# Patient Record
Sex: MALE | Race: WHITE | NOT HISPANIC OR LATINO | URBAN - METROPOLITAN AREA
[De-identification: names, ages, dates, MRNs, and addresses within clinical notes are randomized per-mention and may not be internally consistent; named-entity substitution may affect disease eponyms.]

---

## 2023-01-01 ENCOUNTER — INPATIENT (INPATIENT)
Facility: HOSPITAL | Age: 88
LOS: 2 days | DRG: 682 | End: 2023-11-27
Attending: STUDENT IN AN ORGANIZED HEALTH CARE EDUCATION/TRAINING PROGRAM | Admitting: STUDENT IN AN ORGANIZED HEALTH CARE EDUCATION/TRAINING PROGRAM
Payer: COMMERCIAL

## 2023-01-01 ENCOUNTER — INPATIENT (INPATIENT)
Facility: HOSPITAL | Age: 88
LOS: 4 days | Discharge: NURSING, REHAB OR LONG-TERM CARE | DRG: 563 | End: 2023-11-22
Attending: INTERNAL MEDICINE | Admitting: INTERNAL MEDICINE
Payer: COMMERCIAL

## 2023-01-01 VITALS
RESPIRATION RATE: 18 BRPM | SYSTOLIC BLOOD PRESSURE: 148 MMHG | WEIGHT: 199.96 LBS | OXYGEN SATURATION: 98 % | TEMPERATURE: 98 F | DIASTOLIC BLOOD PRESSURE: 67 MMHG | HEART RATE: 78 BPM

## 2023-01-01 VITALS
SYSTOLIC BLOOD PRESSURE: 133 MMHG | HEART RATE: 102 BPM | TEMPERATURE: 97 F | OXYGEN SATURATION: 100 % | RESPIRATION RATE: 18 BRPM | WEIGHT: 182.98 LBS | DIASTOLIC BLOOD PRESSURE: 67 MMHG | HEIGHT: 66 IN

## 2023-01-01 VITALS
DIASTOLIC BLOOD PRESSURE: 70 MMHG | TEMPERATURE: 98 F | OXYGEN SATURATION: 97 % | HEART RATE: 95 BPM | SYSTOLIC BLOOD PRESSURE: 143 MMHG | RESPIRATION RATE: 18 BRPM

## 2023-01-01 VITALS — HEART RATE: 86 BPM | RESPIRATION RATE: 19 BRPM

## 2023-01-01 DIAGNOSIS — N40.0 BENIGN PROSTATIC HYPERPLASIA WITHOUT LOWER URINARY TRACT SYMPTOMS: ICD-10-CM

## 2023-01-01 DIAGNOSIS — E78.5 HYPERLIPIDEMIA, UNSPECIFIED: ICD-10-CM

## 2023-01-01 DIAGNOSIS — I48.91 UNSPECIFIED ATRIAL FIBRILLATION: ICD-10-CM

## 2023-01-01 DIAGNOSIS — R20.0 ANESTHESIA OF SKIN: ICD-10-CM

## 2023-01-01 DIAGNOSIS — N17.9 ACUTE KIDNEY FAILURE, UNSPECIFIED: ICD-10-CM

## 2023-01-01 DIAGNOSIS — D53.9 NUTRITIONAL ANEMIA, UNSPECIFIED: ICD-10-CM

## 2023-01-01 DIAGNOSIS — Z29.9 ENCOUNTER FOR PROPHYLACTIC MEASURES, UNSPECIFIED: ICD-10-CM

## 2023-01-01 DIAGNOSIS — I67.1 CEREBRAL ANEURYSM, NONRUPTURED: ICD-10-CM

## 2023-01-01 DIAGNOSIS — Z87.438 PERSONAL HISTORY OF OTHER DISEASES OF MALE GENITAL ORGANS: ICD-10-CM

## 2023-01-01 DIAGNOSIS — C90.00 MULTIPLE MYELOMA NOT HAVING ACHIEVED REMISSION: ICD-10-CM

## 2023-01-01 DIAGNOSIS — I10 ESSENTIAL (PRIMARY) HYPERTENSION: ICD-10-CM

## 2023-01-01 DIAGNOSIS — R79.89 OTHER SPECIFIED ABNORMAL FINDINGS OF BLOOD CHEMISTRY: ICD-10-CM
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
## 2023-01-01 DIAGNOSIS — R53.81 OTHER MALAISE: ICD-10-CM

## 2023-01-01 DIAGNOSIS — M79.605 PAIN IN LEFT LEG: ICD-10-CM

## 2023-01-01 DIAGNOSIS — J96.01 ACUTE RESPIRATORY FAILURE WITH HYPOXIA: ICD-10-CM

## 2023-01-01 DIAGNOSIS — I48.0 PAROXYSMAL ATRIAL FIBRILLATION: ICD-10-CM

## 2023-01-01 DIAGNOSIS — R29.6 REPEATED FALLS: ICD-10-CM

## 2023-01-01 DIAGNOSIS — Z51.5 ENCOUNTER FOR PALLIATIVE CARE: ICD-10-CM

## 2023-01-01 DIAGNOSIS — G93.41 METABOLIC ENCEPHALOPATHY: ICD-10-CM

## 2023-01-01 DIAGNOSIS — E43 UNSPECIFIED SEVERE PROTEIN-CALORIE MALNUTRITION: ICD-10-CM

## 2023-01-01 LAB
% ALBUMIN: 57.7 % — SIGNIFICANT CHANGE UP
% ALBUMIN: 57.7 % — SIGNIFICANT CHANGE UP
% ALPHA 1: 5.6 % — SIGNIFICANT CHANGE UP
% ALPHA 1: 5.6 % — SIGNIFICANT CHANGE UP
% ALPHA 2: 12 % — SIGNIFICANT CHANGE UP
% ALPHA 2: 12 % — SIGNIFICANT CHANGE UP
% BETA: 12.8 % — SIGNIFICANT CHANGE UP
% BETA: 12.8 % — SIGNIFICANT CHANGE UP
% GAMMA: 11.9 % — SIGNIFICANT CHANGE UP
% GAMMA: 11.9 % — SIGNIFICANT CHANGE UP
24R-OH-CALCIDIOL SERPL-MCNC: 51.9 NG/ML — SIGNIFICANT CHANGE UP (ref 30–80)
24R-OH-CALCIDIOL SERPL-MCNC: 51.9 NG/ML — SIGNIFICANT CHANGE UP (ref 30–80)
A1C WITH ESTIMATED AVERAGE GLUCOSE RESULT: 4.9 % — SIGNIFICANT CHANGE UP (ref 4–5.6)
A1C WITH ESTIMATED AVERAGE GLUCOSE RESULT: 4.9 % — SIGNIFICANT CHANGE UP (ref 4–5.6)
ALBUMIN SERPL ELPH-MCNC: 1.9 G/DL — LOW (ref 3.5–5)
ALBUMIN SERPL ELPH-MCNC: 1.9 G/DL — LOW (ref 3.5–5)
ALBUMIN SERPL ELPH-MCNC: 2.3 G/DL — LOW (ref 3.5–5)
ALBUMIN SERPL ELPH-MCNC: 2.3 G/DL — LOW (ref 3.5–5)
ALBUMIN SERPL ELPH-MCNC: 2.4 G/DL — LOW (ref 3.5–5)
ALBUMIN SERPL ELPH-MCNC: 2.4 G/DL — LOW (ref 3.5–5)
ALBUMIN SERPL ELPH-MCNC: 2.5 G/DL — LOW (ref 3.5–5)
ALBUMIN SERPL ELPH-MCNC: 2.5 G/DL — LOW (ref 3.5–5)
ALBUMIN SERPL ELPH-MCNC: 2.7 G/DL — LOW (ref 3.5–5)
ALBUMIN SERPL ELPH-MCNC: 2.7 G/DL — LOW (ref 3.5–5)
ALBUMIN SERPL ELPH-MCNC: 3.2 G/DL — LOW (ref 3.6–5.5)
ALBUMIN SERPL ELPH-MCNC: 3.2 G/DL — LOW (ref 3.6–5.5)
ALBUMIN SERPL ELPH-MCNC: 3.3 G/DL — LOW (ref 3.5–5)
ALBUMIN SERPL ELPH-MCNC: 3.3 G/DL — LOW (ref 3.5–5)
ALBUMIN/GLOB SERPL ELPH: 1.3 RATIO — SIGNIFICANT CHANGE UP
ALBUMIN/GLOB SERPL ELPH: 1.3 RATIO — SIGNIFICANT CHANGE UP
ALP SERPL-CCNC: 41 U/L — SIGNIFICANT CHANGE UP (ref 40–120)
ALP SERPL-CCNC: 41 U/L — SIGNIFICANT CHANGE UP (ref 40–120)
ALP SERPL-CCNC: 43 U/L — SIGNIFICANT CHANGE UP (ref 40–120)
ALP SERPL-CCNC: 43 U/L — SIGNIFICANT CHANGE UP (ref 40–120)
ALP SERPL-CCNC: 48 U/L — SIGNIFICANT CHANGE UP (ref 40–120)
ALP SERPL-CCNC: 48 U/L — SIGNIFICANT CHANGE UP (ref 40–120)
ALP SERPL-CCNC: 54 U/L — SIGNIFICANT CHANGE UP (ref 40–120)
ALP SERPL-CCNC: 54 U/L — SIGNIFICANT CHANGE UP (ref 40–120)
ALP SERPL-CCNC: 57 U/L — SIGNIFICANT CHANGE UP (ref 40–120)
ALP SERPL-CCNC: 57 U/L — SIGNIFICANT CHANGE UP (ref 40–120)
ALP SERPL-CCNC: 66 U/L — SIGNIFICANT CHANGE UP (ref 40–120)
ALP SERPL-CCNC: 66 U/L — SIGNIFICANT CHANGE UP (ref 40–120)
ALPHA1 GLOB SERPL ELPH-MCNC: 0.3 G/DL — SIGNIFICANT CHANGE UP (ref 0.1–0.4)
ALPHA1 GLOB SERPL ELPH-MCNC: 0.3 G/DL — SIGNIFICANT CHANGE UP (ref 0.1–0.4)
ALPHA2 GLOB SERPL ELPH-MCNC: 0.7 G/DL — SIGNIFICANT CHANGE UP (ref 0.5–1)
ALPHA2 GLOB SERPL ELPH-MCNC: 0.7 G/DL — SIGNIFICANT CHANGE UP (ref 0.5–1)
ALT FLD-CCNC: 19 U/L DA — SIGNIFICANT CHANGE UP (ref 10–60)
ALT FLD-CCNC: 19 U/L DA — SIGNIFICANT CHANGE UP (ref 10–60)
ALT FLD-CCNC: 22 U/L DA — SIGNIFICANT CHANGE UP (ref 10–60)
ALT FLD-CCNC: 22 U/L DA — SIGNIFICANT CHANGE UP (ref 10–60)
ALT FLD-CCNC: 24 U/L DA — SIGNIFICANT CHANGE UP (ref 10–60)
ALT FLD-CCNC: 24 U/L DA — SIGNIFICANT CHANGE UP (ref 10–60)
ALT FLD-CCNC: 29 U/L DA — SIGNIFICANT CHANGE UP (ref 10–60)
ALT FLD-CCNC: 29 U/L DA — SIGNIFICANT CHANGE UP (ref 10–60)
ALT FLD-CCNC: 32 U/L DA — SIGNIFICANT CHANGE UP (ref 10–60)
ALT FLD-CCNC: 32 U/L DA — SIGNIFICANT CHANGE UP (ref 10–60)
ALT FLD-CCNC: 960 U/L DA — HIGH (ref 10–60)
ALT FLD-CCNC: 960 U/L DA — HIGH (ref 10–60)
AMMONIA BLD-MCNC: 129 UMOL/L — HIGH (ref 11–32)
AMMONIA BLD-MCNC: 129 UMOL/L — HIGH (ref 11–32)
ANA PAT FLD IF-IMP: ABNORMAL
ANA PAT FLD IF-IMP: ABNORMAL
ANA TITR SER: ABNORMAL
ANA TITR SER: ABNORMAL
ANION GAP SERPL CALC-SCNC: 22 MMOL/L — HIGH (ref 5–17)
ANION GAP SERPL CALC-SCNC: 22 MMOL/L — HIGH (ref 5–17)
ANION GAP SERPL CALC-SCNC: 3 MMOL/L — LOW (ref 5–17)
ANION GAP SERPL CALC-SCNC: 3 MMOL/L — LOW (ref 5–17)
ANION GAP SERPL CALC-SCNC: 5 MMOL/L — SIGNIFICANT CHANGE UP (ref 5–17)
ANION GAP SERPL CALC-SCNC: 6 MMOL/L — SIGNIFICANT CHANGE UP (ref 5–17)
ANION GAP SERPL CALC-SCNC: 7 MMOL/L — SIGNIFICANT CHANGE UP (ref 5–17)
ANION GAP SERPL CALC-SCNC: 9 MMOL/L — SIGNIFICANT CHANGE UP (ref 5–17)
ANION GAP SERPL CALC-SCNC: 9 MMOL/L — SIGNIFICANT CHANGE UP (ref 5–17)
ANISOCYTOSIS BLD QL: SLIGHT — SIGNIFICANT CHANGE UP
APPEARANCE UR: CLEAR — SIGNIFICANT CHANGE UP
APTT BLD: 25.6 SEC — SIGNIFICANT CHANGE UP (ref 24.5–35.6)
APTT BLD: 25.6 SEC — SIGNIFICANT CHANGE UP (ref 24.5–35.6)
APTT BLD: 26.2 SEC — SIGNIFICANT CHANGE UP (ref 24.5–35.6)
APTT BLD: 26.2 SEC — SIGNIFICANT CHANGE UP (ref 24.5–35.6)
APTT BLD: 26.5 SEC — SIGNIFICANT CHANGE UP (ref 24.5–35.6)
APTT BLD: 26.5 SEC — SIGNIFICANT CHANGE UP (ref 24.5–35.6)
AST SERPL-CCNC: 15 U/L — SIGNIFICANT CHANGE UP (ref 10–40)
AST SERPL-CCNC: 15 U/L — SIGNIFICANT CHANGE UP (ref 10–40)
AST SERPL-CCNC: 1849 U/L — HIGH (ref 10–40)
AST SERPL-CCNC: 1849 U/L — HIGH (ref 10–40)
AST SERPL-CCNC: 21 U/L — SIGNIFICANT CHANGE UP (ref 10–40)
AST SERPL-CCNC: 21 U/L — SIGNIFICANT CHANGE UP (ref 10–40)
AST SERPL-CCNC: 22 U/L — SIGNIFICANT CHANGE UP (ref 10–40)
AST SERPL-CCNC: 22 U/L — SIGNIFICANT CHANGE UP (ref 10–40)
AST SERPL-CCNC: 30 U/L — SIGNIFICANT CHANGE UP (ref 10–40)
AST SERPL-CCNC: 30 U/L — SIGNIFICANT CHANGE UP (ref 10–40)
AST SERPL-CCNC: 33 U/L — SIGNIFICANT CHANGE UP (ref 10–40)
AST SERPL-CCNC: 33 U/L — SIGNIFICANT CHANGE UP (ref 10–40)
B-GLOBULIN SERPL ELPH-MCNC: 0.7 G/DL — SIGNIFICANT CHANGE UP (ref 0.5–1)
B-GLOBULIN SERPL ELPH-MCNC: 0.7 G/DL — SIGNIFICANT CHANGE UP (ref 0.5–1)
BACTERIA # UR AUTO: ABNORMAL /HPF
BACTERIA # UR AUTO: ABNORMAL /HPF
BASE EXCESS BLDV CALC-SCNC: -14.2 MMOL/L — SIGNIFICANT CHANGE UP
BASE EXCESS BLDV CALC-SCNC: -14.2 MMOL/L — SIGNIFICANT CHANGE UP
BASOPHILS # BLD AUTO: 0 K/UL — SIGNIFICANT CHANGE UP (ref 0–0.2)
BASOPHILS # BLD AUTO: 0.02 K/UL — SIGNIFICANT CHANGE UP (ref 0–0.2)
BASOPHILS # BLD AUTO: 0.02 K/UL — SIGNIFICANT CHANGE UP (ref 0–0.2)
BASOPHILS NFR BLD AUTO: 0 % — SIGNIFICANT CHANGE UP (ref 0–2)
BASOPHILS NFR BLD AUTO: 0.2 % — SIGNIFICANT CHANGE UP (ref 0–2)
BASOPHILS NFR BLD AUTO: 0.2 % — SIGNIFICANT CHANGE UP (ref 0–2)
BILIRUB DIRECT SERPL-MCNC: 0.2 MG/DL — SIGNIFICANT CHANGE UP (ref 0–0.3)
BILIRUB DIRECT SERPL-MCNC: 0.2 MG/DL — SIGNIFICANT CHANGE UP (ref 0–0.3)
BILIRUB INDIRECT FLD-MCNC: 0.4 MG/DL — SIGNIFICANT CHANGE UP (ref 0.2–1)
BILIRUB INDIRECT FLD-MCNC: 0.4 MG/DL — SIGNIFICANT CHANGE UP (ref 0.2–1)
BILIRUB SERPL-MCNC: 0.5 MG/DL — SIGNIFICANT CHANGE UP (ref 0.2–1.2)
BILIRUB SERPL-MCNC: 0.5 MG/DL — SIGNIFICANT CHANGE UP (ref 0.2–1.2)
BILIRUB SERPL-MCNC: 0.6 MG/DL — SIGNIFICANT CHANGE UP (ref 0.2–1.2)
BILIRUB SERPL-MCNC: 0.8 MG/DL — SIGNIFICANT CHANGE UP (ref 0.2–1.2)
BILIRUB SERPL-MCNC: 0.8 MG/DL — SIGNIFICANT CHANGE UP (ref 0.2–1.2)
BILIRUB SERPL-MCNC: 1 MG/DL — SIGNIFICANT CHANGE UP (ref 0.2–1.2)
BILIRUB SERPL-MCNC: 1 MG/DL — SIGNIFICANT CHANGE UP (ref 0.2–1.2)
BILIRUB SERPL-MCNC: 1.4 MG/DL — HIGH (ref 0.2–1.2)
BILIRUB SERPL-MCNC: 1.4 MG/DL — HIGH (ref 0.2–1.2)
BILIRUB UR-MCNC: NEGATIVE — SIGNIFICANT CHANGE UP
BUN SERPL-MCNC: 29 MG/DL — HIGH (ref 7–18)
BUN SERPL-MCNC: 29 MG/DL — HIGH (ref 7–18)
BUN SERPL-MCNC: 30 MG/DL — HIGH (ref 7–18)
BUN SERPL-MCNC: 30 MG/DL — HIGH (ref 7–18)
BUN SERPL-MCNC: 32 MG/DL — HIGH (ref 7–18)
BUN SERPL-MCNC: 32 MG/DL — HIGH (ref 7–18)
BUN SERPL-MCNC: 36 MG/DL — HIGH (ref 7–18)
BUN SERPL-MCNC: 39 MG/DL — HIGH (ref 7–18)
BUN SERPL-MCNC: 39 MG/DL — HIGH (ref 7–18)
BUN SERPL-MCNC: 42 MG/DL — HIGH (ref 7–18)
BUN SERPL-MCNC: 42 MG/DL — HIGH (ref 7–18)
BUN SERPL-MCNC: 43 MG/DL — HIGH (ref 7–18)
BUN SERPL-MCNC: 43 MG/DL — HIGH (ref 7–18)
BUN SERPL-MCNC: 54 MG/DL — HIGH (ref 7–18)
BUN SERPL-MCNC: 54 MG/DL — HIGH (ref 7–18)
BUN SERPL-MCNC: 63 MG/DL — HIGH (ref 7–18)
BUN SERPL-MCNC: 63 MG/DL — HIGH (ref 7–18)
BUN SERPL-MCNC: 66 MG/DL — HIGH (ref 7–18)
BUN SERPL-MCNC: 66 MG/DL — HIGH (ref 7–18)
C3 SERPL-MCNC: 97 MG/DL — SIGNIFICANT CHANGE UP (ref 81–157)
C3 SERPL-MCNC: 97 MG/DL — SIGNIFICANT CHANGE UP (ref 81–157)
C4 SERPL-MCNC: 31 MG/DL — SIGNIFICANT CHANGE UP (ref 13–39)
C4 SERPL-MCNC: 31 MG/DL — SIGNIFICANT CHANGE UP (ref 13–39)
CA-I BLD-SCNC: 5 MG/DL — SIGNIFICANT CHANGE UP (ref 4.5–5.6)
CA-I BLD-SCNC: 5 MG/DL — SIGNIFICANT CHANGE UP (ref 4.5–5.6)
CA-I SERPL-SCNC: SIGNIFICANT CHANGE UP MMOL/L (ref 1.15–1.33)
CA-I SERPL-SCNC: SIGNIFICANT CHANGE UP MMOL/L (ref 1.15–1.33)
CALCIUM SERPL-MCNC: 7.7 MG/DL — LOW (ref 8.4–10.5)
CALCIUM SERPL-MCNC: 7.7 MG/DL — LOW (ref 8.4–10.5)
CALCIUM SERPL-MCNC: 7.8 MG/DL — LOW (ref 8.4–10.5)
CALCIUM SERPL-MCNC: 7.8 MG/DL — LOW (ref 8.4–10.5)
CALCIUM SERPL-MCNC: 8.1 MG/DL — LOW (ref 8.4–10.5)
CALCIUM SERPL-MCNC: 8.1 MG/DL — LOW (ref 8.4–10.5)
CALCIUM SERPL-MCNC: 8.3 MG/DL — LOW (ref 8.4–10.5)
CALCIUM SERPL-MCNC: 8.3 MG/DL — LOW (ref 8.4–10.5)
CALCIUM SERPL-MCNC: 8.4 MG/DL — SIGNIFICANT CHANGE UP (ref 8.4–10.5)
CALCIUM SERPL-MCNC: 8.4 MG/DL — SIGNIFICANT CHANGE UP (ref 8.4–10.5)
CALCIUM SERPL-MCNC: 8.5 MG/DL — SIGNIFICANT CHANGE UP (ref 8.4–10.5)
CALCIUM SERPL-MCNC: 8.5 MG/DL — SIGNIFICANT CHANGE UP (ref 8.4–10.5)
CALCIUM SERPL-MCNC: 8.7 MG/DL — SIGNIFICANT CHANGE UP (ref 8.4–10.5)
CALCIUM SERPL-MCNC: 8.7 MG/DL — SIGNIFICANT CHANGE UP (ref 8.4–10.5)
CALCIUM SERPL-MCNC: 8.8 MG/DL — SIGNIFICANT CHANGE UP (ref 8.4–10.5)
CALCIUM SERPL-MCNC: 8.8 MG/DL — SIGNIFICANT CHANGE UP (ref 8.4–10.5)
CALCIUM SERPL-MCNC: 8.9 MG/DL — SIGNIFICANT CHANGE UP (ref 8.4–10.5)
CALCIUM SERPL-MCNC: 8.9 MG/DL — SIGNIFICANT CHANGE UP (ref 8.4–10.5)
CALCIUM SERPL-MCNC: 9.1 MG/DL — SIGNIFICANT CHANGE UP (ref 8.4–10.5)
CALCIUM SERPL-MCNC: 9.1 MG/DL — SIGNIFICANT CHANGE UP (ref 8.4–10.5)
CALCIUM SERPL-MCNC: 9.6 MG/DL — SIGNIFICANT CHANGE UP (ref 8.4–10.5)
CALCIUM SERPL-MCNC: 9.6 MG/DL — SIGNIFICANT CHANGE UP (ref 8.4–10.5)
CHLORIDE SERPL-SCNC: 108 MMOL/L — SIGNIFICANT CHANGE UP (ref 96–108)
CHLORIDE SERPL-SCNC: 109 MMOL/L — HIGH (ref 96–108)
CHLORIDE SERPL-SCNC: 110 MMOL/L — HIGH (ref 96–108)
CHLORIDE SERPL-SCNC: 111 MMOL/L — HIGH (ref 96–108)
CHLORIDE SERPL-SCNC: 111 MMOL/L — HIGH (ref 96–108)
CHLORIDE SERPL-SCNC: 112 MMOL/L — HIGH (ref 96–108)
CHLORIDE SERPL-SCNC: 112 MMOL/L — HIGH (ref 96–108)
CHLORIDE SERPL-SCNC: 115 MMOL/L — HIGH (ref 96–108)
CHLORIDE SERPL-SCNC: 115 MMOL/L — HIGH (ref 96–108)
CK SERPL-CCNC: 394 U/L — HIGH (ref 35–232)
CK SERPL-CCNC: 394 U/L — HIGH (ref 35–232)
CK SERPL-CCNC: 448 U/L — HIGH (ref 35–232)
CK SERPL-CCNC: 448 U/L — HIGH (ref 35–232)
CO2 SERPL-SCNC: 11 MMOL/L — LOW (ref 22–31)
CO2 SERPL-SCNC: 11 MMOL/L — LOW (ref 22–31)
CO2 SERPL-SCNC: 23 MMOL/L — SIGNIFICANT CHANGE UP (ref 22–31)
CO2 SERPL-SCNC: 23 MMOL/L — SIGNIFICANT CHANGE UP (ref 22–31)
CO2 SERPL-SCNC: 24 MMOL/L — SIGNIFICANT CHANGE UP (ref 22–31)
CO2 SERPL-SCNC: 25 MMOL/L — SIGNIFICANT CHANGE UP (ref 22–31)
CO2 SERPL-SCNC: 26 MMOL/L — SIGNIFICANT CHANGE UP (ref 22–31)
CO2 SERPL-SCNC: 27 MMOL/L — SIGNIFICANT CHANGE UP (ref 22–31)
CO2 SERPL-SCNC: 27 MMOL/L — SIGNIFICANT CHANGE UP (ref 22–31)
COLOR SPEC: YELLOW — SIGNIFICANT CHANGE UP
CREAT ?TM UR-MCNC: 125 MG/DL — SIGNIFICANT CHANGE UP
CREAT ?TM UR-MCNC: 125 MG/DL — SIGNIFICANT CHANGE UP
CREAT ?TM UR-MCNC: 280 MG/DL — SIGNIFICANT CHANGE UP
CREAT ?TM UR-MCNC: 280 MG/DL — SIGNIFICANT CHANGE UP
CREAT SERPL-MCNC: 1.37 MG/DL — HIGH (ref 0.5–1.3)
CREAT SERPL-MCNC: 1.37 MG/DL — HIGH (ref 0.5–1.3)
CREAT SERPL-MCNC: 1.64 MG/DL — HIGH (ref 0.5–1.3)
CREAT SERPL-MCNC: 1.64 MG/DL — HIGH (ref 0.5–1.3)
CREAT SERPL-MCNC: 1.7 MG/DL — HIGH (ref 0.5–1.3)
CREAT SERPL-MCNC: 1.7 MG/DL — HIGH (ref 0.5–1.3)
CREAT SERPL-MCNC: 1.75 MG/DL — HIGH (ref 0.5–1.3)
CREAT SERPL-MCNC: 2 MG/DL — HIGH (ref 0.5–1.3)
CREAT SERPL-MCNC: 2 MG/DL — HIGH (ref 0.5–1.3)
CREAT SERPL-MCNC: 2.81 MG/DL — HIGH (ref 0.5–1.3)
CREAT SERPL-MCNC: 2.81 MG/DL — HIGH (ref 0.5–1.3)
CREAT SERPL-MCNC: 3.03 MG/DL — HIGH (ref 0.5–1.3)
CREAT SERPL-MCNC: 3.03 MG/DL — HIGH (ref 0.5–1.3)
CREAT SERPL-MCNC: 3.26 MG/DL — HIGH (ref 0.5–1.3)
CREAT SERPL-MCNC: 3.26 MG/DL — HIGH (ref 0.5–1.3)
CREAT SERPL-MCNC: 3.27 MG/DL — HIGH (ref 0.5–1.3)
CREAT SERPL-MCNC: 3.27 MG/DL — HIGH (ref 0.5–1.3)
CREAT SERPL-MCNC: 4.21 MG/DL — HIGH (ref 0.5–1.3)
CREAT SERPL-MCNC: 4.21 MG/DL — HIGH (ref 0.5–1.3)
CREATININE, URINE RESULT: 124 MG/DL — SIGNIFICANT CHANGE UP
CREATININE, URINE RESULT: 124 MG/DL — SIGNIFICANT CHANGE UP
CRP SERPL-MCNC: 97 MG/L — HIGH
CRP SERPL-MCNC: 97 MG/L — HIGH
CULTURE RESULTS: NO GROWTH — SIGNIFICANT CHANGE UP
CULTURE RESULTS: NO GROWTH — SIGNIFICANT CHANGE UP
DACRYOCYTES BLD QL SMEAR: SLIGHT — SIGNIFICANT CHANGE UP
DACRYOCYTES BLD QL SMEAR: SLIGHT — SIGNIFICANT CHANGE UP
DIFF PNL FLD: ABNORMAL
DIFF PNL FLD: ABNORMAL
DIFF PNL FLD: NEGATIVE — SIGNIFICANT CHANGE UP
DIFF PNL FLD: NEGATIVE — SIGNIFICANT CHANGE UP
DIGOXIN SERPL-MCNC: 0.9 NG/ML — SIGNIFICANT CHANGE UP (ref 0.8–2)
DIGOXIN SERPL-MCNC: 0.9 NG/ML — SIGNIFICANT CHANGE UP (ref 0.8–2)
DIGOXIN SERPL-MCNC: 1 NG/ML — SIGNIFICANT CHANGE UP (ref 0.8–2)
DIGOXIN SERPL-MCNC: 1 NG/ML — SIGNIFICANT CHANGE UP (ref 0.8–2)
EGFR: 13 ML/MIN/1.73M2 — LOW
EGFR: 13 ML/MIN/1.73M2 — LOW
EGFR: 17 ML/MIN/1.73M2 — LOW
EGFR: 19 ML/MIN/1.73M2 — LOW
EGFR: 19 ML/MIN/1.73M2 — LOW
EGFR: 20 ML/MIN/1.73M2 — LOW
EGFR: 20 ML/MIN/1.73M2 — LOW
EGFR: 31 ML/MIN/1.73M2 — LOW
EGFR: 31 ML/MIN/1.73M2 — LOW
EGFR: 36 ML/MIN/1.73M2 — LOW
EGFR: 37 ML/MIN/1.73M2 — LOW
EGFR: 37 ML/MIN/1.73M2 — LOW
EGFR: 39 ML/MIN/1.73M2 — LOW
EGFR: 39 ML/MIN/1.73M2 — LOW
EGFR: 48 ML/MIN/1.73M2 — LOW
EGFR: 48 ML/MIN/1.73M2 — LOW
EOSINOPHIL # BLD AUTO: 0 K/UL — SIGNIFICANT CHANGE UP (ref 0–0.5)
EOSINOPHIL # BLD AUTO: 0.01 K/UL — SIGNIFICANT CHANGE UP (ref 0–0.5)
EOSINOPHIL # BLD AUTO: 0.01 K/UL — SIGNIFICANT CHANGE UP (ref 0–0.5)
EOSINOPHIL NFR BLD AUTO: 0 % — SIGNIFICANT CHANGE UP (ref 0–6)
EOSINOPHIL NFR BLD AUTO: 0.1 % — SIGNIFICANT CHANGE UP (ref 0–6)
EOSINOPHIL NFR BLD AUTO: 0.1 % — SIGNIFICANT CHANGE UP (ref 0–6)
EPI CELLS # UR: SIGNIFICANT CHANGE UP
EPI CELLS # UR: SIGNIFICANT CHANGE UP
ERYTHROCYTE [SEDIMENTATION RATE] IN BLOOD: 53 MM/HR — HIGH (ref 0–20)
ERYTHROCYTE [SEDIMENTATION RATE] IN BLOOD: 53 MM/HR — HIGH (ref 0–20)
ESTIMATED AVERAGE GLUCOSE: 94 MG/DL — SIGNIFICANT CHANGE UP (ref 68–114)
ESTIMATED AVERAGE GLUCOSE: 94 MG/DL — SIGNIFICANT CHANGE UP (ref 68–114)
FERRITIN SERPL-MCNC: 630 NG/ML — HIGH (ref 30–400)
FERRITIN SERPL-MCNC: 630 NG/ML — HIGH (ref 30–400)
FOLATE SERPL-MCNC: 5.9 NG/ML — SIGNIFICANT CHANGE UP
FOLATE SERPL-MCNC: 5.9 NG/ML — SIGNIFICANT CHANGE UP
FOLATE SERPL-MCNC: 6 NG/ML — SIGNIFICANT CHANGE UP
FOLATE SERPL-MCNC: 6 NG/ML — SIGNIFICANT CHANGE UP
GAMMA GLOBULIN: 0.7 G/DL — SIGNIFICANT CHANGE UP (ref 0.6–1.6)
GAMMA GLOBULIN: 0.7 G/DL — SIGNIFICANT CHANGE UP (ref 0.6–1.6)
GAS PNL BLDA: SIGNIFICANT CHANGE UP
GAS PNL BLDA: SIGNIFICANT CHANGE UP
GAS PNL BLDV: 140 MMOL/L — SIGNIFICANT CHANGE UP (ref 136–145)
GAS PNL BLDV: 140 MMOL/L — SIGNIFICANT CHANGE UP (ref 136–145)
GAS PNL BLDV: SIGNIFICANT CHANGE UP
GAS PNL BLDV: SIGNIFICANT CHANGE UP
GLUCOSE BLDC GLUCOMTR-MCNC: 159 MG/DL — HIGH (ref 70–99)
GLUCOSE BLDC GLUCOMTR-MCNC: 159 MG/DL — HIGH (ref 70–99)
GLUCOSE SERPL-MCNC: 100 MG/DL — HIGH (ref 70–99)
GLUCOSE SERPL-MCNC: 100 MG/DL — HIGH (ref 70–99)
GLUCOSE SERPL-MCNC: 101 MG/DL — HIGH (ref 70–99)
GLUCOSE SERPL-MCNC: 108 MG/DL — HIGH (ref 70–99)
GLUCOSE SERPL-MCNC: 108 MG/DL — HIGH (ref 70–99)
GLUCOSE SERPL-MCNC: 110 MG/DL — HIGH (ref 70–99)
GLUCOSE SERPL-MCNC: 110 MG/DL — HIGH (ref 70–99)
GLUCOSE SERPL-MCNC: 116 MG/DL — HIGH (ref 70–99)
GLUCOSE SERPL-MCNC: 116 MG/DL — HIGH (ref 70–99)
GLUCOSE SERPL-MCNC: 123 MG/DL — HIGH (ref 70–99)
GLUCOSE SERPL-MCNC: 123 MG/DL — HIGH (ref 70–99)
GLUCOSE SERPL-MCNC: 141 MG/DL — HIGH (ref 70–99)
GLUCOSE SERPL-MCNC: 141 MG/DL — HIGH (ref 70–99)
GLUCOSE SERPL-MCNC: 64 MG/DL — LOW (ref 70–99)
GLUCOSE SERPL-MCNC: 64 MG/DL — LOW (ref 70–99)
GLUCOSE SERPL-MCNC: 96 MG/DL — SIGNIFICANT CHANGE UP (ref 70–99)
GLUCOSE SERPL-MCNC: 96 MG/DL — SIGNIFICANT CHANGE UP (ref 70–99)
GLUCOSE SERPL-MCNC: 97 MG/DL — SIGNIFICANT CHANGE UP (ref 70–99)
GLUCOSE SERPL-MCNC: 97 MG/DL — SIGNIFICANT CHANGE UP (ref 70–99)
GLUCOSE UR QL: NEGATIVE MG/DL — SIGNIFICANT CHANGE UP
HAPTOGLOB SERPL-MCNC: 278 MG/DL — HIGH (ref 34–200)
HAPTOGLOB SERPL-MCNC: 278 MG/DL — HIGH (ref 34–200)
HCO3 BLDV-SCNC: 13 MMOL/L — LOW (ref 22–29)
HCO3 BLDV-SCNC: 13 MMOL/L — LOW (ref 22–29)
HCT VFR BLD CALC: 23 % — LOW (ref 39–50)
HCT VFR BLD CALC: 23 % — LOW (ref 39–50)
HCT VFR BLD CALC: 24.1 % — LOW (ref 39–50)
HCT VFR BLD CALC: 24.1 % — LOW (ref 39–50)
HCT VFR BLD CALC: 25 % — LOW (ref 39–50)
HCT VFR BLD CALC: 25 % — LOW (ref 39–50)
HCT VFR BLD CALC: 25.2 % — LOW (ref 39–50)
HCT VFR BLD CALC: 25.2 % — LOW (ref 39–50)
HCT VFR BLD CALC: 25.4 % — LOW (ref 39–50)
HCT VFR BLD CALC: 25.4 % — LOW (ref 39–50)
HCT VFR BLD CALC: 28.2 % — LOW (ref 39–50)
HCT VFR BLD CALC: 28.2 % — LOW (ref 39–50)
HCT VFR BLD CALC: 28.7 % — LOW (ref 39–50)
HCT VFR BLD CALC: 28.7 % — LOW (ref 39–50)
HCT VFR BLD CALC: 30.3 % — LOW (ref 39–50)
HCT VFR BLD CALC: 30.3 % — LOW (ref 39–50)
HGB BLD-MCNC: 7.3 G/DL — LOW (ref 13–17)
HGB BLD-MCNC: 7.3 G/DL — LOW (ref 13–17)
HGB BLD-MCNC: 7.8 G/DL — LOW (ref 13–17)
HGB BLD-MCNC: 7.8 G/DL — LOW (ref 13–17)
HGB BLD-MCNC: 7.9 G/DL — LOW (ref 13–17)
HGB BLD-MCNC: 7.9 G/DL — LOW (ref 13–17)
HGB BLD-MCNC: 8.2 G/DL — LOW (ref 13–17)
HGB BLD-MCNC: 8.2 G/DL — LOW (ref 13–17)
HGB BLD-MCNC: 8.3 G/DL — LOW (ref 13–17)
HGB BLD-MCNC: 9 G/DL — LOW (ref 13–17)
HGB BLD-MCNC: 9 G/DL — LOW (ref 13–17)
HGB BLD-MCNC: 9.7 G/DL — LOW (ref 13–17)
HGB BLD-MCNC: 9.7 G/DL — LOW (ref 13–17)
HYPOCHROMIA BLD QL: SLIGHT — SIGNIFICANT CHANGE UP
HYPOCHROMIA BLD QL: SLIGHT — SIGNIFICANT CHANGE UP
IMM GRANULOCYTES NFR BLD AUTO: 0.9 % — SIGNIFICANT CHANGE UP (ref 0–0.9)
IMM GRANULOCYTES NFR BLD AUTO: 0.9 % — SIGNIFICANT CHANGE UP (ref 0–0.9)
INR BLD: 1.09 RATIO — SIGNIFICANT CHANGE UP (ref 0.85–1.18)
INR BLD: 1.09 RATIO — SIGNIFICANT CHANGE UP (ref 0.85–1.18)
INR BLD: 1.1 RATIO — SIGNIFICANT CHANGE UP (ref 0.85–1.18)
INR BLD: 1.1 RATIO — SIGNIFICANT CHANGE UP (ref 0.85–1.18)
INR BLD: 1.5 RATIO — HIGH (ref 0.85–1.18)
INR BLD: 1.5 RATIO — HIGH (ref 0.85–1.18)
INTERPRETATION SERPL IFE-IMP: SIGNIFICANT CHANGE UP
INTERPRETATION SERPL IFE-IMP: SIGNIFICANT CHANGE UP
IRON SATN MFR SERPL: 4 % — LOW (ref 20–55)
IRON SATN MFR SERPL: 4 % — LOW (ref 20–55)
IRON SATN MFR SERPL: 9 UG/DL — LOW (ref 65–170)
IRON SATN MFR SERPL: 9 UG/DL — LOW (ref 65–170)
KETONES UR-MCNC: ABNORMAL MG/DL
KETONES UR-MCNC: ABNORMAL MG/DL
KETONES UR-MCNC: NEGATIVE MG/DL — SIGNIFICANT CHANGE UP
KETONES UR-MCNC: NEGATIVE MG/DL — SIGNIFICANT CHANGE UP
LACTATE BLDV-MCNC: 13.3 MMOL/L — CRITICAL HIGH (ref 0.5–2)
LACTATE BLDV-MCNC: 13.3 MMOL/L — CRITICAL HIGH (ref 0.5–2)
LACTATE SERPL-SCNC: 1.9 MMOL/L — SIGNIFICANT CHANGE UP (ref 0.7–2)
LACTATE SERPL-SCNC: 1.9 MMOL/L — SIGNIFICANT CHANGE UP (ref 0.7–2)
LACTATE SERPL-SCNC: 18.5 MMOL/L — CRITICAL HIGH (ref 0.7–2)
LACTATE SERPL-SCNC: 18.5 MMOL/L — CRITICAL HIGH (ref 0.7–2)
LACTATE SERPL-SCNC: 2.3 MMOL/L — HIGH (ref 0.7–2)
LACTATE SERPL-SCNC: 2.3 MMOL/L — HIGH (ref 0.7–2)
LDH SERPL L TO P-CCNC: 227 U/L — HIGH (ref 120–225)
LDH SERPL L TO P-CCNC: 227 U/L — HIGH (ref 120–225)
LEUKOCYTE ESTERASE UR-ACNC: NEGATIVE — SIGNIFICANT CHANGE UP
LYMPHOCYTES # BLD AUTO: 0.36 K/UL — LOW (ref 1–3.3)
LYMPHOCYTES # BLD AUTO: 0.36 K/UL — LOW (ref 1–3.3)
LYMPHOCYTES # BLD AUTO: 0.55 K/UL — LOW (ref 1–3.3)
LYMPHOCYTES # BLD AUTO: 0.55 K/UL — LOW (ref 1–3.3)
LYMPHOCYTES # BLD AUTO: 0.66 K/UL — LOW (ref 1–3.3)
LYMPHOCYTES # BLD AUTO: 0.66 K/UL — LOW (ref 1–3.3)
LYMPHOCYTES # BLD AUTO: 1.49 K/UL — SIGNIFICANT CHANGE UP (ref 1–3.3)
LYMPHOCYTES # BLD AUTO: 1.49 K/UL — SIGNIFICANT CHANGE UP (ref 1–3.3)
LYMPHOCYTES # BLD AUTO: 13 % — SIGNIFICANT CHANGE UP (ref 13–44)
LYMPHOCYTES # BLD AUTO: 13 % — SIGNIFICANT CHANGE UP (ref 13–44)
LYMPHOCYTES # BLD AUTO: 3 % — LOW (ref 13–44)
LYMPHOCYTES # BLD AUTO: 3 % — LOW (ref 13–44)
LYMPHOCYTES # BLD AUTO: 6 % — LOW (ref 13–44)
LYMPHOCYTES # BLD AUTO: 6 % — LOW (ref 13–44)
LYMPHOCYTES # BLD AUTO: 6.4 % — LOW (ref 13–44)
LYMPHOCYTES # BLD AUTO: 6.4 % — LOW (ref 13–44)
M PROTEIN 24H UR ELPH-MRATE: SIGNIFICANT CHANGE UP MG/DL
M PROTEIN 24H UR ELPH-MRATE: SIGNIFICANT CHANGE UP MG/DL
MACROCYTES BLD QL: SLIGHT — SIGNIFICANT CHANGE UP
MACROCYTES BLD QL: SLIGHT — SIGNIFICANT CHANGE UP
MAGNESIUM SERPL-MCNC: 1.3 MG/DL — LOW (ref 1.6–2.6)
MAGNESIUM SERPL-MCNC: 1.3 MG/DL — LOW (ref 1.6–2.6)
MAGNESIUM SERPL-MCNC: 1.6 MG/DL — SIGNIFICANT CHANGE UP (ref 1.6–2.6)
MAGNESIUM SERPL-MCNC: 1.6 MG/DL — SIGNIFICANT CHANGE UP (ref 1.6–2.6)
MAGNESIUM SERPL-MCNC: 1.8 MG/DL — SIGNIFICANT CHANGE UP (ref 1.6–2.6)
MAGNESIUM SERPL-MCNC: 1.9 MG/DL — SIGNIFICANT CHANGE UP (ref 1.6–2.6)
MAGNESIUM SERPL-MCNC: 1.9 MG/DL — SIGNIFICANT CHANGE UP (ref 1.6–2.6)
MAGNESIUM SERPL-MCNC: 2 MG/DL — SIGNIFICANT CHANGE UP (ref 1.6–2.6)
MAGNESIUM SERPL-MCNC: 2.4 MG/DL — SIGNIFICANT CHANGE UP (ref 1.6–2.6)
MAGNESIUM SERPL-MCNC: 2.4 MG/DL — SIGNIFICANT CHANGE UP (ref 1.6–2.6)
MANUAL SMEAR VERIFICATION: SIGNIFICANT CHANGE UP
MCHC RBC-ENTMCNC: 29.4 GM/DL — LOW (ref 32–36)
MCHC RBC-ENTMCNC: 29.4 GM/DL — LOW (ref 32–36)
MCHC RBC-ENTMCNC: 31 GM/DL — LOW (ref 32–36)
MCHC RBC-ENTMCNC: 31 GM/DL — LOW (ref 32–36)
MCHC RBC-ENTMCNC: 31.4 GM/DL — LOW (ref 32–36)
MCHC RBC-ENTMCNC: 31.4 GM/DL — LOW (ref 32–36)
MCHC RBC-ENTMCNC: 31.7 GM/DL — LOW (ref 32–36)
MCHC RBC-ENTMCNC: 31.7 GM/DL — LOW (ref 32–36)
MCHC RBC-ENTMCNC: 32 GM/DL — SIGNIFICANT CHANGE UP (ref 32–36)
MCHC RBC-ENTMCNC: 32 GM/DL — SIGNIFICANT CHANGE UP (ref 32–36)
MCHC RBC-ENTMCNC: 32.5 PG — SIGNIFICANT CHANGE UP (ref 27–34)
MCHC RBC-ENTMCNC: 32.6 PG — SIGNIFICANT CHANGE UP (ref 27–34)
MCHC RBC-ENTMCNC: 32.6 PG — SIGNIFICANT CHANGE UP (ref 27–34)
MCHC RBC-ENTMCNC: 32.7 GM/DL — SIGNIFICANT CHANGE UP (ref 32–36)
MCHC RBC-ENTMCNC: 32.7 GM/DL — SIGNIFICANT CHANGE UP (ref 32–36)
MCHC RBC-ENTMCNC: 32.8 GM/DL — SIGNIFICANT CHANGE UP (ref 32–36)
MCHC RBC-ENTMCNC: 32.8 PG — SIGNIFICANT CHANGE UP (ref 27–34)
MCHC RBC-ENTMCNC: 32.8 PG — SIGNIFICANT CHANGE UP (ref 27–34)
MCHC RBC-ENTMCNC: 32.9 PG — SIGNIFICANT CHANGE UP (ref 27–34)
MCHC RBC-ENTMCNC: 34 PG — SIGNIFICANT CHANGE UP (ref 27–34)
MCHC RBC-ENTMCNC: 34 PG — SIGNIFICANT CHANGE UP (ref 27–34)
MCV RBC AUTO: 100.4 FL — HIGH (ref 80–100)
MCV RBC AUTO: 102.7 FL — HIGH (ref 80–100)
MCV RBC AUTO: 102.7 FL — HIGH (ref 80–100)
MCV RBC AUTO: 103.6 FL — HIGH (ref 80–100)
MCV RBC AUTO: 103.6 FL — HIGH (ref 80–100)
MCV RBC AUTO: 104 FL — HIGH (ref 80–100)
MCV RBC AUTO: 104 FL — HIGH (ref 80–100)
MCV RBC AUTO: 105 FL — HIGH (ref 80–100)
MCV RBC AUTO: 105 FL — HIGH (ref 80–100)
MCV RBC AUTO: 115.6 FL — HIGH (ref 80–100)
MCV RBC AUTO: 115.6 FL — HIGH (ref 80–100)
MCV RBC AUTO: 99.2 FL — SIGNIFICANT CHANGE UP (ref 80–100)
MCV RBC AUTO: 99.2 FL — SIGNIFICANT CHANGE UP (ref 80–100)
MONOCYTES # BLD AUTO: 0.46 K/UL — SIGNIFICANT CHANGE UP (ref 0–0.9)
MONOCYTES # BLD AUTO: 0.46 K/UL — SIGNIFICANT CHANGE UP (ref 0–0.9)
MONOCYTES # BLD AUTO: 0.6 K/UL — SIGNIFICANT CHANGE UP (ref 0–0.9)
MONOCYTES # BLD AUTO: 0.6 K/UL — SIGNIFICANT CHANGE UP (ref 0–0.9)
MONOCYTES # BLD AUTO: 0.83 K/UL — SIGNIFICANT CHANGE UP (ref 0–0.9)
MONOCYTES # BLD AUTO: 0.83 K/UL — SIGNIFICANT CHANGE UP (ref 0–0.9)
MONOCYTES # BLD AUTO: 0.98 K/UL — HIGH (ref 0–0.9)
MONOCYTES # BLD AUTO: 0.98 K/UL — HIGH (ref 0–0.9)
MONOCYTES NFR BLD AUTO: 4 % — SIGNIFICANT CHANGE UP (ref 2–14)
MONOCYTES NFR BLD AUTO: 4 % — SIGNIFICANT CHANGE UP (ref 2–14)
MONOCYTES NFR BLD AUTO: 5 % — SIGNIFICANT CHANGE UP (ref 2–14)
MONOCYTES NFR BLD AUTO: 5 % — SIGNIFICANT CHANGE UP (ref 2–14)
MONOCYTES NFR BLD AUTO: 9 % — SIGNIFICANT CHANGE UP (ref 2–14)
MONOCYTES NFR BLD AUTO: 9 % — SIGNIFICANT CHANGE UP (ref 2–14)
MONOCYTES NFR BLD AUTO: 9.5 % — SIGNIFICANT CHANGE UP (ref 2–14)
MONOCYTES NFR BLD AUTO: 9.5 % — SIGNIFICANT CHANGE UP (ref 2–14)
MRSA PCR RESULT.: SIGNIFICANT CHANGE UP
MRSA PCR RESULT.: SIGNIFICANT CHANGE UP
NEUTROPHILS # BLD AUTO: 10.98 K/UL — HIGH (ref 1.8–7.4)
NEUTROPHILS # BLD AUTO: 10.98 K/UL — HIGH (ref 1.8–7.4)
NEUTROPHILS # BLD AUTO: 7.85 K/UL — HIGH (ref 1.8–7.4)
NEUTROPHILS # BLD AUTO: 7.85 K/UL — HIGH (ref 1.8–7.4)
NEUTROPHILS # BLD AUTO: 8.59 K/UL — HIGH (ref 1.8–7.4)
NEUTROPHILS # BLD AUTO: 8.59 K/UL — HIGH (ref 1.8–7.4)
NEUTROPHILS # BLD AUTO: 9.53 K/UL — HIGH (ref 1.8–7.4)
NEUTROPHILS # BLD AUTO: 9.53 K/UL — HIGH (ref 1.8–7.4)
NEUTROPHILS NFR BLD AUTO: 82.9 % — HIGH (ref 43–77)
NEUTROPHILS NFR BLD AUTO: 82.9 % — HIGH (ref 43–77)
NEUTROPHILS NFR BLD AUTO: 83 % — HIGH (ref 43–77)
NEUTROPHILS NFR BLD AUTO: 92 % — HIGH (ref 43–77)
NEUTROPHILS NFR BLD AUTO: 92 % — HIGH (ref 43–77)
NEUTS BAND # BLD: 2 % — SIGNIFICANT CHANGE UP (ref 0–8)
NEUTS BAND # BLD: 2 % — SIGNIFICANT CHANGE UP (ref 0–8)
NITRITE UR-MCNC: NEGATIVE — SIGNIFICANT CHANGE UP
NRBC # BLD: 0 /100 WBCS — SIGNIFICANT CHANGE UP (ref 0–0)
NRBC # BLD: 0 /100 — SIGNIFICANT CHANGE UP (ref 0–0)
OSMOLALITY UR: 402 MOS/KG — SIGNIFICANT CHANGE UP (ref 50–1200)
OSMOLALITY UR: 402 MOS/KG — SIGNIFICANT CHANGE UP (ref 50–1200)
OSMOLALITY UR: 471 MOS/KG — SIGNIFICANT CHANGE UP (ref 50–1200)
OSMOLALITY UR: 471 MOS/KG — SIGNIFICANT CHANGE UP (ref 50–1200)
OVALOCYTES BLD QL SMEAR: SLIGHT — SIGNIFICANT CHANGE UP
OVALOCYTES BLD QL SMEAR: SLIGHT — SIGNIFICANT CHANGE UP
PCO2 BLDV: 34 MMHG — LOW (ref 42–55)
PCO2 BLDV: 34 MMHG — LOW (ref 42–55)
PH BLDV: 7.19 — LOW (ref 7.32–7.43)
PH BLDV: 7.19 — LOW (ref 7.32–7.43)
PH UR: 5.5 — SIGNIFICANT CHANGE UP (ref 5–8)
PHOSPHATE SERPL-MCNC: 2.4 MG/DL — LOW (ref 2.5–4.5)
PHOSPHATE SERPL-MCNC: 2.4 MG/DL — LOW (ref 2.5–4.5)
PHOSPHATE SERPL-MCNC: 2.7 MG/DL — SIGNIFICANT CHANGE UP (ref 2.5–4.5)
PHOSPHATE SERPL-MCNC: 3 MG/DL — SIGNIFICANT CHANGE UP (ref 2.5–4.5)
PHOSPHATE SERPL-MCNC: 3 MG/DL — SIGNIFICANT CHANGE UP (ref 2.5–4.5)
PHOSPHATE SERPL-MCNC: 3.4 MG/DL — SIGNIFICANT CHANGE UP (ref 2.5–4.5)
PHOSPHATE SERPL-MCNC: 3.4 MG/DL — SIGNIFICANT CHANGE UP (ref 2.5–4.5)
PHOSPHATE SERPL-MCNC: 3.7 MG/DL — SIGNIFICANT CHANGE UP (ref 2.5–4.5)
PHOSPHATE SERPL-MCNC: 3.7 MG/DL — SIGNIFICANT CHANGE UP (ref 2.5–4.5)
PHOSPHATE SERPL-MCNC: 4 MG/DL — SIGNIFICANT CHANGE UP (ref 2.5–4.5)
PHOSPHATE SERPL-MCNC: 4 MG/DL — SIGNIFICANT CHANGE UP (ref 2.5–4.5)
PHOSPHATE SERPL-MCNC: 4.1 MG/DL — SIGNIFICANT CHANGE UP (ref 2.5–4.5)
PHOSPHATE SERPL-MCNC: 4.1 MG/DL — SIGNIFICANT CHANGE UP (ref 2.5–4.5)
PHOSPHATE SERPL-MCNC: 7.9 MG/DL — HIGH (ref 2.5–4.5)
PHOSPHATE SERPL-MCNC: 7.9 MG/DL — HIGH (ref 2.5–4.5)
PLAT MORPH BLD: NORMAL — SIGNIFICANT CHANGE UP
PLATELET # BLD AUTO: 117 K/UL — LOW (ref 150–400)
PLATELET # BLD AUTO: 117 K/UL — LOW (ref 150–400)
PLATELET # BLD AUTO: 120 K/UL — LOW (ref 150–400)
PLATELET # BLD AUTO: 120 K/UL — LOW (ref 150–400)
PLATELET # BLD AUTO: 144 K/UL — LOW (ref 150–400)
PLATELET # BLD AUTO: 144 K/UL — LOW (ref 150–400)
PLATELET # BLD AUTO: 166 K/UL — SIGNIFICANT CHANGE UP (ref 150–400)
PLATELET # BLD AUTO: 166 K/UL — SIGNIFICANT CHANGE UP (ref 150–400)
PLATELET # BLD AUTO: 230 K/UL — SIGNIFICANT CHANGE UP (ref 150–400)
PLATELET # BLD AUTO: 230 K/UL — SIGNIFICANT CHANGE UP (ref 150–400)
PLATELET # BLD AUTO: 250 K/UL — SIGNIFICANT CHANGE UP (ref 150–400)
PLATELET # BLD AUTO: 250 K/UL — SIGNIFICANT CHANGE UP (ref 150–400)
PLATELET # BLD AUTO: 252 K/UL — SIGNIFICANT CHANGE UP (ref 150–400)
PLATELET # BLD AUTO: 252 K/UL — SIGNIFICANT CHANGE UP (ref 150–400)
PLATELET # BLD AUTO: 279 K/UL — SIGNIFICANT CHANGE UP (ref 150–400)
PLATELET # BLD AUTO: 279 K/UL — SIGNIFICANT CHANGE UP (ref 150–400)
PLATELET COUNT - ESTIMATE: NORMAL — SIGNIFICANT CHANGE UP
PO2 BLDV: 48 MMHG — SIGNIFICANT CHANGE UP
PO2 BLDV: 48 MMHG — SIGNIFICANT CHANGE UP
POIKILOCYTOSIS BLD QL AUTO: SLIGHT — SIGNIFICANT CHANGE UP
POTASSIUM BLDV-SCNC: 4.8 MMOL/L — SIGNIFICANT CHANGE UP (ref 3.5–5.1)
POTASSIUM BLDV-SCNC: 4.8 MMOL/L — SIGNIFICANT CHANGE UP (ref 3.5–5.1)
POTASSIUM SERPL-MCNC: 4.1 MMOL/L — SIGNIFICANT CHANGE UP (ref 3.5–5.3)
POTASSIUM SERPL-MCNC: 4.2 MMOL/L — SIGNIFICANT CHANGE UP (ref 3.5–5.3)
POTASSIUM SERPL-MCNC: 4.4 MMOL/L — SIGNIFICANT CHANGE UP (ref 3.5–5.3)
POTASSIUM SERPL-MCNC: 4.6 MMOL/L — SIGNIFICANT CHANGE UP (ref 3.5–5.3)
POTASSIUM SERPL-MCNC: 4.6 MMOL/L — SIGNIFICANT CHANGE UP (ref 3.5–5.3)
POTASSIUM SERPL-MCNC: 4.8 MMOL/L — SIGNIFICANT CHANGE UP (ref 3.5–5.3)
POTASSIUM SERPL-MCNC: 4.8 MMOL/L — SIGNIFICANT CHANGE UP (ref 3.5–5.3)
POTASSIUM SERPL-MCNC: 5.2 MMOL/L — SIGNIFICANT CHANGE UP (ref 3.5–5.3)
POTASSIUM SERPL-MCNC: 5.2 MMOL/L — SIGNIFICANT CHANGE UP (ref 3.5–5.3)
POTASSIUM SERPL-MCNC: 5.4 MMOL/L — HIGH (ref 3.5–5.3)
POTASSIUM SERPL-MCNC: 5.4 MMOL/L — HIGH (ref 3.5–5.3)
POTASSIUM SERPL-SCNC: 4.1 MMOL/L — SIGNIFICANT CHANGE UP (ref 3.5–5.3)
POTASSIUM SERPL-SCNC: 4.2 MMOL/L — SIGNIFICANT CHANGE UP (ref 3.5–5.3)
POTASSIUM SERPL-SCNC: 4.4 MMOL/L — SIGNIFICANT CHANGE UP (ref 3.5–5.3)
POTASSIUM SERPL-SCNC: 4.6 MMOL/L — SIGNIFICANT CHANGE UP (ref 3.5–5.3)
POTASSIUM SERPL-SCNC: 4.6 MMOL/L — SIGNIFICANT CHANGE UP (ref 3.5–5.3)
POTASSIUM SERPL-SCNC: 4.8 MMOL/L — SIGNIFICANT CHANGE UP (ref 3.5–5.3)
POTASSIUM SERPL-SCNC: 4.8 MMOL/L — SIGNIFICANT CHANGE UP (ref 3.5–5.3)
POTASSIUM SERPL-SCNC: 5.2 MMOL/L — SIGNIFICANT CHANGE UP (ref 3.5–5.3)
POTASSIUM SERPL-SCNC: 5.2 MMOL/L — SIGNIFICANT CHANGE UP (ref 3.5–5.3)
POTASSIUM SERPL-SCNC: 5.4 MMOL/L — HIGH (ref 3.5–5.3)
POTASSIUM SERPL-SCNC: 5.4 MMOL/L — HIGH (ref 3.5–5.3)
PROCALCITONIN SERPL-MCNC: 0.76 NG/ML — HIGH (ref 0.02–0.1)
PROCALCITONIN SERPL-MCNC: 0.76 NG/ML — HIGH (ref 0.02–0.1)
PROT ?TM UR-MCNC: 16 MG/DL — HIGH (ref 0–12)
PROT ?TM UR-MCNC: 28 MG/DL — HIGH (ref 0–12)
PROT ?TM UR-MCNC: 28 MG/DL — HIGH (ref 0–12)
PROT ?TM UR-MCNC: 95 MG/DL — HIGH (ref 0–12)
PROT ?TM UR-MCNC: 95 MG/DL — HIGH (ref 0–12)
PROT PATTERN 24H UR ELPH-IMP: SIGNIFICANT CHANGE UP
PROT PATTERN 24H UR ELPH-IMP: SIGNIFICANT CHANGE UP
PROT PATTERN SERPL ELPH-IMP: SIGNIFICANT CHANGE UP
PROT PATTERN SERPL ELPH-IMP: SIGNIFICANT CHANGE UP
PROT SERPL-MCNC: 5 G/DL — LOW (ref 6–8.3)
PROT SERPL-MCNC: 5 G/DL — LOW (ref 6–8.3)
PROT SERPL-MCNC: 5.4 G/DL — LOW (ref 6–8.3)
PROT SERPL-MCNC: 5.4 G/DL — LOW (ref 6–8.3)
PROT SERPL-MCNC: 5.5 G/DL — LOW (ref 6–8.3)
PROT SERPL-MCNC: 5.5 G/DL — LOW (ref 6–8.3)
PROT SERPL-MCNC: 5.6 G/DL — LOW (ref 6–8.3)
PROT SERPL-MCNC: 6.1 G/DL — SIGNIFICANT CHANGE UP (ref 6–8.3)
PROT SERPL-MCNC: 6.1 G/DL — SIGNIFICANT CHANGE UP (ref 6–8.3)
PROT SERPL-MCNC: 7.1 G/DL — SIGNIFICANT CHANGE UP (ref 6–8.3)
PROT SERPL-MCNC: 7.1 G/DL — SIGNIFICANT CHANGE UP (ref 6–8.3)
PROT UR-MCNC: ABNORMAL MG/DL
PROT UR-MCNC: ABNORMAL MG/DL
PROT UR-MCNC: NEGATIVE MG/DL — SIGNIFICANT CHANGE UP
PROT UR-MCNC: NEGATIVE MG/DL — SIGNIFICANT CHANGE UP
PROTHROM AB SERPL-ACNC: 12.4 SEC — SIGNIFICANT CHANGE UP (ref 9.5–13)
PROTHROM AB SERPL-ACNC: 12.4 SEC — SIGNIFICANT CHANGE UP (ref 9.5–13)
PROTHROM AB SERPL-ACNC: 12.5 SEC — SIGNIFICANT CHANGE UP (ref 9.5–13)
PROTHROM AB SERPL-ACNC: 12.5 SEC — SIGNIFICANT CHANGE UP (ref 9.5–13)
PROTHROM AB SERPL-ACNC: 16.9 SEC — HIGH (ref 9.5–13)
PROTHROM AB SERPL-ACNC: 16.9 SEC — HIGH (ref 9.5–13)
RAPID RVP RESULT: SIGNIFICANT CHANGE UP
RAPID RVP RESULT: SIGNIFICANT CHANGE UP
RBC # BLD: 2.22 M/UL — LOW (ref 4.2–5.8)
RBC # BLD: 2.22 M/UL — LOW (ref 4.2–5.8)
RBC # BLD: 2.4 M/UL — LOW (ref 4.2–5.8)
RBC # BLD: 2.44 M/UL — LOW (ref 4.2–5.8)
RBC # BLD: 2.44 M/UL — LOW (ref 4.2–5.8)
RBC # BLD: 2.52 M/UL — LOW (ref 4.2–5.8)
RBC # BLD: 2.52 M/UL — LOW (ref 4.2–5.8)
RBC # BLD: 2.53 M/UL — LOW (ref 4.2–5.8)
RBC # BLD: 2.53 M/UL — LOW (ref 4.2–5.8)
RBC # BLD: 2.62 M/UL — LOW (ref 4.2–5.8)
RBC # BLD: 2.62 M/UL — LOW (ref 4.2–5.8)
RBC # BLD: 2.76 M/UL — LOW (ref 4.2–5.8)
RBC # BLD: 2.76 M/UL — LOW (ref 4.2–5.8)
RBC # BLD: 2.95 M/UL — LOW (ref 4.2–5.8)
RBC # BLD: 2.95 M/UL — LOW (ref 4.2–5.8)
RBC # FLD: 12.9 % — SIGNIFICANT CHANGE UP (ref 10.3–14.5)
RBC # FLD: 12.9 % — SIGNIFICANT CHANGE UP (ref 10.3–14.5)
RBC # FLD: 13 % — SIGNIFICANT CHANGE UP (ref 10.3–14.5)
RBC # FLD: 13 % — SIGNIFICANT CHANGE UP (ref 10.3–14.5)
RBC # FLD: 13.1 % — SIGNIFICANT CHANGE UP (ref 10.3–14.5)
RBC # FLD: 13.1 % — SIGNIFICANT CHANGE UP (ref 10.3–14.5)
RBC # FLD: 13.4 % — SIGNIFICANT CHANGE UP (ref 10.3–14.5)
RBC # FLD: 13.4 % — SIGNIFICANT CHANGE UP (ref 10.3–14.5)
RBC # FLD: 13.9 % — SIGNIFICANT CHANGE UP (ref 10.3–14.5)
RBC # FLD: 13.9 % — SIGNIFICANT CHANGE UP (ref 10.3–14.5)
RBC # FLD: 14.5 % — SIGNIFICANT CHANGE UP (ref 10.3–14.5)
RBC # FLD: 14.5 % — SIGNIFICANT CHANGE UP (ref 10.3–14.5)
RBC # FLD: 14.9 % — HIGH (ref 10.3–14.5)
RBC # FLD: 14.9 % — HIGH (ref 10.3–14.5)
RBC # FLD: 15.3 % — HIGH (ref 10.3–14.5)
RBC # FLD: 15.3 % — HIGH (ref 10.3–14.5)
RBC BLD AUTO: ABNORMAL
RBC CASTS # UR COMP ASSIST: SIGNIFICANT CHANGE UP /HPF (ref 0–4)
RBC CASTS # UR COMP ASSIST: SIGNIFICANT CHANGE UP /HPF (ref 0–4)
RETICS #: 65.2 K/UL — SIGNIFICANT CHANGE UP (ref 25–125)
RETICS #: 65.2 K/UL — SIGNIFICANT CHANGE UP (ref 25–125)
RETICS/RBC NFR: 2.5 % — SIGNIFICANT CHANGE UP (ref 0.5–2.5)
RETICS/RBC NFR: 2.5 % — SIGNIFICANT CHANGE UP (ref 0.5–2.5)
RHEUMATOID FACT SERPL-ACNC: 14 IU/ML — HIGH (ref 0–13)
RHEUMATOID FACT SERPL-ACNC: 14 IU/ML — HIGH (ref 0–13)
S AUREUS DNA NOSE QL NAA+PROBE: DETECTED
S AUREUS DNA NOSE QL NAA+PROBE: DETECTED
SAO2 % BLDV: 70.5 % — SIGNIFICANT CHANGE UP
SAO2 % BLDV: 70.5 % — SIGNIFICANT CHANGE UP
SARS-COV-2 RNA SPEC QL NAA+PROBE: SIGNIFICANT CHANGE UP
SCHISTOCYTES BLD QL AUTO: SLIGHT — SIGNIFICANT CHANGE UP
SCHISTOCYTES BLD QL AUTO: SLIGHT — SIGNIFICANT CHANGE UP
SODIUM SERPL-SCNC: 138 MMOL/L — SIGNIFICANT CHANGE UP (ref 135–145)
SODIUM SERPL-SCNC: 138 MMOL/L — SIGNIFICANT CHANGE UP (ref 135–145)
SODIUM SERPL-SCNC: 139 MMOL/L — SIGNIFICANT CHANGE UP (ref 135–145)
SODIUM SERPL-SCNC: 139 MMOL/L — SIGNIFICANT CHANGE UP (ref 135–145)
SODIUM SERPL-SCNC: 140 MMOL/L — SIGNIFICANT CHANGE UP (ref 135–145)
SODIUM SERPL-SCNC: 141 MMOL/L — SIGNIFICANT CHANGE UP (ref 135–145)
SODIUM SERPL-SCNC: 142 MMOL/L — SIGNIFICANT CHANGE UP (ref 135–145)
SODIUM SERPL-SCNC: 146 MMOL/L — HIGH (ref 135–145)
SODIUM SERPL-SCNC: 146 MMOL/L — HIGH (ref 135–145)
SODIUM UR-SCNC: 19 MMOL/L — SIGNIFICANT CHANGE UP
SODIUM UR-SCNC: 19 MMOL/L — SIGNIFICANT CHANGE UP
SODIUM UR-SCNC: 48 MMOL/L — SIGNIFICANT CHANGE UP
SODIUM UR-SCNC: 48 MMOL/L — SIGNIFICANT CHANGE UP
SP GR SPEC: 1.01 — SIGNIFICANT CHANGE UP (ref 1–1.03)
SP GR SPEC: 1.01 — SIGNIFICANT CHANGE UP (ref 1–1.03)
SP GR SPEC: 1.02 — SIGNIFICANT CHANGE UP (ref 1–1.03)
SP GR SPEC: 1.02 — SIGNIFICANT CHANGE UP (ref 1–1.03)
SPECIMEN SOURCE: SIGNIFICANT CHANGE UP
SPECIMEN SOURCE: SIGNIFICANT CHANGE UP
T4 FREE SERPL-MCNC: 1 NG/DL — SIGNIFICANT CHANGE UP (ref 0.9–1.8)
T4 FREE SERPL-MCNC: 1 NG/DL — SIGNIFICANT CHANGE UP (ref 0.9–1.8)
TIBC SERPL-MCNC: 214 UG/DL — LOW (ref 250–450)
TIBC SERPL-MCNC: 214 UG/DL — LOW (ref 250–450)
TM INTERPRETATION: SIGNIFICANT CHANGE UP
TM INTERPRETATION: SIGNIFICANT CHANGE UP
TROPONIN I, HIGH SENSITIVITY RESULT: 161.9 NG/L — HIGH
TROPONIN I, HIGH SENSITIVITY RESULT: 161.9 NG/L — HIGH
TROPONIN I, HIGH SENSITIVITY RESULT: 165.4 NG/L — HIGH
TROPONIN I, HIGH SENSITIVITY RESULT: 165.4 NG/L — HIGH
TSH SERPL-MCNC: 0.62 UU/ML — SIGNIFICANT CHANGE UP (ref 0.34–4.82)
TSH SERPL-MCNC: 0.62 UU/ML — SIGNIFICANT CHANGE UP (ref 0.34–4.82)
UIBC SERPL-MCNC: 205 UG/DL — SIGNIFICANT CHANGE UP (ref 110–370)
UIBC SERPL-MCNC: 205 UG/DL — SIGNIFICANT CHANGE UP (ref 110–370)
URINE CREATININE CALCULATION: SIGNIFICANT CHANGE UP G/24 H (ref 1–2)
URINE CREATININE CALCULATION: SIGNIFICANT CHANGE UP G/24 H (ref 1–2)
UROBILINOGEN FLD QL: 0.2 E.U./DL — SIGNIFICANT CHANGE UP (ref 0.2–1)
UROBILINOGEN FLD QL: 0.2 E.U./DL — SIGNIFICANT CHANGE UP (ref 0.2–1)
UROBILINOGEN FLD QL: 1 MG/DL — SIGNIFICANT CHANGE UP (ref 0.2–1)
UROBILINOGEN FLD QL: 1 MG/DL — SIGNIFICANT CHANGE UP (ref 0.2–1)
UUN UR-MCNC: 350 MG/DL — SIGNIFICANT CHANGE UP
UUN UR-MCNC: 350 MG/DL — SIGNIFICANT CHANGE UP
UUN UR-MCNC: 519 MG/DL — SIGNIFICANT CHANGE UP
UUN UR-MCNC: 519 MG/DL — SIGNIFICANT CHANGE UP
VIT B12 SERPL-MCNC: 455 PG/ML — SIGNIFICANT CHANGE UP (ref 232–1245)
VIT B12 SERPL-MCNC: 455 PG/ML — SIGNIFICANT CHANGE UP (ref 232–1245)
VIT B12 SERPL-MCNC: 465 PG/ML — SIGNIFICANT CHANGE UP (ref 232–1245)
VIT B12 SERPL-MCNC: 465 PG/ML — SIGNIFICANT CHANGE UP (ref 232–1245)
WBC # BLD: 10.35 K/UL — SIGNIFICANT CHANGE UP (ref 3.8–10.5)
WBC # BLD: 10.35 K/UL — SIGNIFICANT CHANGE UP (ref 3.8–10.5)
WBC # BLD: 10.49 K/UL — SIGNIFICANT CHANGE UP (ref 3.8–10.5)
WBC # BLD: 10.49 K/UL — SIGNIFICANT CHANGE UP (ref 3.8–10.5)
WBC # BLD: 11.48 K/UL — HIGH (ref 3.8–10.5)
WBC # BLD: 11.48 K/UL — HIGH (ref 3.8–10.5)
WBC # BLD: 11.94 K/UL — HIGH (ref 3.8–10.5)
WBC # BLD: 11.94 K/UL — HIGH (ref 3.8–10.5)
WBC # BLD: 5.71 K/UL — SIGNIFICANT CHANGE UP (ref 3.8–10.5)
WBC # BLD: 5.71 K/UL — SIGNIFICANT CHANGE UP (ref 3.8–10.5)
WBC # BLD: 5.92 K/UL — SIGNIFICANT CHANGE UP (ref 3.8–10.5)
WBC # BLD: 5.92 K/UL — SIGNIFICANT CHANGE UP (ref 3.8–10.5)
WBC # BLD: 7.75 K/UL — SIGNIFICANT CHANGE UP (ref 3.8–10.5)
WBC # BLD: 7.75 K/UL — SIGNIFICANT CHANGE UP (ref 3.8–10.5)
WBC # BLD: 9.24 K/UL — SIGNIFICANT CHANGE UP (ref 3.8–10.5)
WBC # BLD: 9.24 K/UL — SIGNIFICANT CHANGE UP (ref 3.8–10.5)
WBC # FLD AUTO: 10.35 K/UL — SIGNIFICANT CHANGE UP (ref 3.8–10.5)
WBC # FLD AUTO: 10.35 K/UL — SIGNIFICANT CHANGE UP (ref 3.8–10.5)
WBC # FLD AUTO: 10.49 K/UL — SIGNIFICANT CHANGE UP (ref 3.8–10.5)
WBC # FLD AUTO: 10.49 K/UL — SIGNIFICANT CHANGE UP (ref 3.8–10.5)
WBC # FLD AUTO: 11.48 K/UL — HIGH (ref 3.8–10.5)
WBC # FLD AUTO: 11.48 K/UL — HIGH (ref 3.8–10.5)
WBC # FLD AUTO: 11.94 K/UL — HIGH (ref 3.8–10.5)
WBC # FLD AUTO: 11.94 K/UL — HIGH (ref 3.8–10.5)
WBC # FLD AUTO: 5.71 K/UL — SIGNIFICANT CHANGE UP (ref 3.8–10.5)
WBC # FLD AUTO: 5.71 K/UL — SIGNIFICANT CHANGE UP (ref 3.8–10.5)
WBC # FLD AUTO: 5.92 K/UL — SIGNIFICANT CHANGE UP (ref 3.8–10.5)
WBC # FLD AUTO: 5.92 K/UL — SIGNIFICANT CHANGE UP (ref 3.8–10.5)
WBC # FLD AUTO: 7.75 K/UL — SIGNIFICANT CHANGE UP (ref 3.8–10.5)
WBC # FLD AUTO: 7.75 K/UL — SIGNIFICANT CHANGE UP (ref 3.8–10.5)
WBC # FLD AUTO: 9.24 K/UL — SIGNIFICANT CHANGE UP (ref 3.8–10.5)
WBC # FLD AUTO: 9.24 K/UL — SIGNIFICANT CHANGE UP (ref 3.8–10.5)
WBC UR QL: SIGNIFICANT CHANGE UP /HPF (ref 0–5)
WBC UR QL: SIGNIFICANT CHANGE UP /HPF (ref 0–5)

## 2023-01-01 PROCEDURE — 99222 1ST HOSP IP/OBS MODERATE 55: CPT

## 2023-01-01 PROCEDURE — 99223 1ST HOSP IP/OBS HIGH 75: CPT | Mod: 25

## 2023-01-01 PROCEDURE — 93306 TTE W/DOPPLER COMPLETE: CPT | Mod: 26

## 2023-01-01 PROCEDURE — 74176 CT ABD & PELVIS W/O CONTRAST: CPT | Mod: 26

## 2023-01-01 PROCEDURE — 94002 VENT MGMT INPAT INIT DAY: CPT

## 2023-01-01 PROCEDURE — 99233 SBSQ HOSP IP/OBS HIGH 50: CPT | Mod: GC

## 2023-01-01 PROCEDURE — 73562 X-RAY EXAM OF KNEE 3: CPT

## 2023-01-01 PROCEDURE — 80048 BASIC METABOLIC PNL TOTAL CA: CPT

## 2023-01-01 PROCEDURE — 99223 1ST HOSP IP/OBS HIGH 75: CPT | Mod: GC

## 2023-01-01 PROCEDURE — 73700 CT LOWER EXTREMITY W/O DYE: CPT | Mod: 26,LT

## 2023-01-01 PROCEDURE — 87205 SMEAR GRAM STAIN: CPT

## 2023-01-01 PROCEDURE — 84156 ASSAY OF PROTEIN URINE: CPT

## 2023-01-01 PROCEDURE — 76700 US EXAM ABDOM COMPLETE: CPT

## 2023-01-01 PROCEDURE — 84166 PROTEIN E-PHORESIS/URINE/CSF: CPT

## 2023-01-01 PROCEDURE — 84155 ASSAY OF PROTEIN SERUM: CPT

## 2023-01-01 PROCEDURE — 83550 IRON BINDING TEST: CPT

## 2023-01-01 PROCEDURE — 72125 CT NECK SPINE W/O DYE: CPT | Mod: 26,MA

## 2023-01-01 PROCEDURE — 73630 X-RAY EXAM OF FOOT: CPT

## 2023-01-01 PROCEDURE — 83090 ASSAY OF HOMOCYSTEINE: CPT

## 2023-01-01 PROCEDURE — 82728 ASSAY OF FERRITIN: CPT

## 2023-01-01 PROCEDURE — 73700 CT LOWER EXTREMITY W/O DYE: CPT

## 2023-01-01 PROCEDURE — 84439 ASSAY OF FREE THYROXINE: CPT

## 2023-01-01 PROCEDURE — 86038 ANTINUCLEAR ANTIBODIES: CPT

## 2023-01-01 PROCEDURE — 82803 BLOOD GASES ANY COMBINATION: CPT

## 2023-01-01 PROCEDURE — 88189 FLOWCYTOMETRY/READ 16 & >: CPT

## 2023-01-01 PROCEDURE — 99285 EMERGENCY DEPT VISIT HI MDM: CPT | Mod: 25

## 2023-01-01 PROCEDURE — 99285 EMERGENCY DEPT VISIT HI MDM: CPT

## 2023-01-01 PROCEDURE — 82330 ASSAY OF CALCIUM: CPT

## 2023-01-01 PROCEDURE — 84540 ASSAY OF URINE/UREA-N: CPT

## 2023-01-01 PROCEDURE — 82746 ASSAY OF FOLIC ACID SERUM: CPT

## 2023-01-01 PROCEDURE — 71045 X-RAY EXAM CHEST 1 VIEW: CPT | Mod: 26

## 2023-01-01 PROCEDURE — 83540 ASSAY OF IRON: CPT

## 2023-01-01 PROCEDURE — 88184 FLOWCYTOMETRY/ TC 1 MARKER: CPT

## 2023-01-01 PROCEDURE — 85730 THROMBOPLASTIN TIME PARTIAL: CPT

## 2023-01-01 PROCEDURE — 70450 CT HEAD/BRAIN W/O DYE: CPT | Mod: MA

## 2023-01-01 PROCEDURE — 73610 X-RAY EXAM OF ANKLE: CPT

## 2023-01-01 PROCEDURE — 83735 ASSAY OF MAGNESIUM: CPT

## 2023-01-01 PROCEDURE — 73590 X-RAY EXAM OF LOWER LEG: CPT | Mod: 26,LT

## 2023-01-01 PROCEDURE — 97162 PT EVAL MOD COMPLEX 30 MIN: CPT

## 2023-01-01 PROCEDURE — 82607 VITAMIN B-12: CPT

## 2023-01-01 PROCEDURE — 72192 CT PELVIS W/O DYE: CPT

## 2023-01-01 PROCEDURE — 73630 X-RAY EXAM OF FOOT: CPT | Mod: 26,LT

## 2023-01-01 PROCEDURE — 86334 IMMUNOFIX E-PHORESIS SERUM: CPT

## 2023-01-01 PROCEDURE — 0225U NFCT DS DNA&RNA 21 SARSCOV2: CPT

## 2023-01-01 PROCEDURE — 85652 RBC SED RATE AUTOMATED: CPT

## 2023-01-01 PROCEDURE — 84100 ASSAY OF PHOSPHORUS: CPT

## 2023-01-01 PROCEDURE — 82962 GLUCOSE BLOOD TEST: CPT

## 2023-01-01 PROCEDURE — 99232 SBSQ HOSP IP/OBS MODERATE 35: CPT | Mod: GC

## 2023-01-01 PROCEDURE — 93010 ELECTROCARDIOGRAM REPORT: CPT

## 2023-01-01 PROCEDURE — 83010 ASSAY OF HAPTOGLOBIN QUANT: CPT

## 2023-01-01 PROCEDURE — 83615 LACTATE (LD) (LDH) ENZYME: CPT

## 2023-01-01 PROCEDURE — 80162 ASSAY OF DIGOXIN TOTAL: CPT

## 2023-01-01 PROCEDURE — 80053 COMPREHEN METABOLIC PANEL: CPT

## 2023-01-01 PROCEDURE — 87640 STAPH A DNA AMP PROBE: CPT

## 2023-01-01 PROCEDURE — 84145 PROCALCITONIN (PCT): CPT

## 2023-01-01 PROCEDURE — 87086 URINE CULTURE/COLONY COUNT: CPT

## 2023-01-01 PROCEDURE — 82306 VITAMIN D 25 HYDROXY: CPT

## 2023-01-01 PROCEDURE — 84443 ASSAY THYROID STIM HORMONE: CPT

## 2023-01-01 PROCEDURE — 86160 COMPLEMENT ANTIGEN: CPT

## 2023-01-01 PROCEDURE — 70450 CT HEAD/BRAIN W/O DYE: CPT | Mod: 26,MA,59

## 2023-01-01 PROCEDURE — 87641 MR-STAPH DNA AMP PROBE: CPT

## 2023-01-01 PROCEDURE — 76775 US EXAM ABDO BACK WALL LIM: CPT | Mod: 26

## 2023-01-01 PROCEDURE — 81003 URINALYSIS AUTO W/O SCOPE: CPT

## 2023-01-01 PROCEDURE — 73562 X-RAY EXAM OF KNEE 3: CPT | Mod: 26,LT

## 2023-01-01 PROCEDURE — 86140 C-REACTIVE PROTEIN: CPT

## 2023-01-01 PROCEDURE — 93005 ELECTROCARDIOGRAM TRACING: CPT

## 2023-01-01 PROCEDURE — 76700 US EXAM ABDOM COMPLETE: CPT | Mod: 26

## 2023-01-01 PROCEDURE — 70496 CT ANGIOGRAPHY HEAD: CPT | Mod: MA

## 2023-01-01 PROCEDURE — 70498 CT ANGIOGRAPHY NECK: CPT | Mod: MA

## 2023-01-01 PROCEDURE — 96372 THER/PROPH/DIAG INJ SC/IM: CPT

## 2023-01-01 PROCEDURE — 85025 COMPLETE CBC W/AUTO DIFF WBC: CPT

## 2023-01-01 PROCEDURE — 87635 SARS-COV-2 COVID-19 AMP PRB: CPT

## 2023-01-01 PROCEDURE — 36415 COLL VENOUS BLD VENIPUNCTURE: CPT

## 2023-01-01 PROCEDURE — 84484 ASSAY OF TROPONIN QUANT: CPT

## 2023-01-01 PROCEDURE — 88185 FLOWCYTOMETRY/TC ADD-ON: CPT

## 2023-01-01 PROCEDURE — 76775 US EXAM ABDO BACK WALL LIM: CPT

## 2023-01-01 PROCEDURE — 83605 ASSAY OF LACTIC ACID: CPT

## 2023-01-01 PROCEDURE — 99239 HOSP IP/OBS DSCHRG MGMT >30: CPT | Mod: GC

## 2023-01-01 PROCEDURE — 76376 3D RENDER W/INTRP POSTPROCES: CPT | Mod: 26

## 2023-01-01 PROCEDURE — 73610 X-RAY EXAM OF ANKLE: CPT | Mod: 26,LT

## 2023-01-01 PROCEDURE — 83935 ASSAY OF URINE OSMOLALITY: CPT

## 2023-01-01 PROCEDURE — 82140 ASSAY OF AMMONIA: CPT

## 2023-01-01 PROCEDURE — 82570 ASSAY OF URINE CREATININE: CPT

## 2023-01-01 PROCEDURE — 84295 ASSAY OF SERUM SODIUM: CPT

## 2023-01-01 PROCEDURE — 99222 1ST HOSP IP/OBS MODERATE 55: CPT | Mod: GC

## 2023-01-01 PROCEDURE — 72192 CT PELVIS W/O DYE: CPT | Mod: 26

## 2023-01-01 PROCEDURE — 70450 CT HEAD/BRAIN W/O DYE: CPT | Mod: 26,MA

## 2023-01-01 PROCEDURE — 76376 3D RENDER W/INTRP POSTPROCES: CPT

## 2023-01-01 PROCEDURE — 84132 ASSAY OF SERUM POTASSIUM: CPT

## 2023-01-01 PROCEDURE — 83921 ORGANIC ACID SINGLE QUANT: CPT

## 2023-01-01 PROCEDURE — 86431 RHEUMATOID FACTOR QUANT: CPT

## 2023-01-01 PROCEDURE — 72125 CT NECK SPINE W/O DYE: CPT | Mod: MA

## 2023-01-01 PROCEDURE — 93306 TTE W/DOPPLER COMPLETE: CPT

## 2023-01-01 PROCEDURE — 85045 AUTOMATED RETICULOCYTE COUNT: CPT

## 2023-01-01 PROCEDURE — 84300 ASSAY OF URINE SODIUM: CPT

## 2023-01-01 PROCEDURE — 84165 PROTEIN E-PHORESIS SERUM: CPT

## 2023-01-01 PROCEDURE — 71045 X-RAY EXAM CHEST 1 VIEW: CPT

## 2023-01-01 PROCEDURE — 85610 PROTHROMBIN TIME: CPT

## 2023-01-01 PROCEDURE — 73590 X-RAY EXAM OF LOWER LEG: CPT

## 2023-01-01 PROCEDURE — 73560 X-RAY EXAM OF KNEE 1 OR 2: CPT | Mod: 26,LT

## 2023-01-01 PROCEDURE — 70498 CT ANGIOGRAPHY NECK: CPT | Mod: 26

## 2023-01-01 PROCEDURE — 73560 X-RAY EXAM OF KNEE 1 OR 2: CPT

## 2023-01-01 PROCEDURE — 83036 HEMOGLOBIN GLYCOSYLATED A1C: CPT

## 2023-01-01 PROCEDURE — 87040 BLOOD CULTURE FOR BACTERIA: CPT

## 2023-01-01 PROCEDURE — 74176 CT ABD & PELVIS W/O CONTRAST: CPT

## 2023-01-01 PROCEDURE — 70496 CT ANGIOGRAPHY HEAD: CPT | Mod: 26

## 2023-01-01 PROCEDURE — 80076 HEPATIC FUNCTION PANEL: CPT

## 2023-01-01 PROCEDURE — 99497 ADVNCD CARE PLAN 30 MIN: CPT | Mod: 25

## 2023-01-01 PROCEDURE — 85027 COMPLETE CBC AUTOMATED: CPT

## 2023-01-01 PROCEDURE — 81001 URINALYSIS AUTO W/SCOPE: CPT

## 2023-01-01 PROCEDURE — 82550 ASSAY OF CK (CPK): CPT

## 2023-01-01 RX ORDER — ETOMIDATE 2 MG/ML
20 INJECTION INTRAVENOUS ONCE
Refills: 0 | Status: COMPLETED | OUTPATIENT
Start: 2023-01-01 | End: 2023-01-01

## 2023-01-01 RX ORDER — METRONIDAZOLE 500 MG
500 TABLET ORAL ONCE
Refills: 0 | Status: COMPLETED | OUTPATIENT
Start: 2023-01-01 | End: 2023-01-01

## 2023-01-01 RX ORDER — METOPROLOL TARTRATE 50 MG
1 TABLET ORAL
Refills: 0 | DISCHARGE

## 2023-01-01 RX ORDER — ASCORBIC ACID 60 MG
1 TABLET,CHEWABLE ORAL
Qty: 0 | Refills: 0 | DISCHARGE
Start: 2023-01-01

## 2023-01-01 RX ORDER — SODIUM CHLORIDE 9 MG/ML
1000 INJECTION, SOLUTION INTRAVENOUS ONCE
Refills: 0 | Status: COMPLETED | OUTPATIENT
Start: 2023-01-01 | End: 2023-01-01

## 2023-01-01 RX ORDER — LOSARTAN POTASSIUM 100 MG/1
1 TABLET, FILM COATED ORAL
Refills: 0 | DISCHARGE

## 2023-01-01 RX ORDER — INFLUENZA VIRUS VACCINE 15; 15; 15; 15 UG/.5ML; UG/.5ML; UG/.5ML; UG/.5ML
0.7 SUSPENSION INTRAMUSCULAR ONCE
Refills: 0 | Status: DISCONTINUED | OUTPATIENT
Start: 2023-01-01 | End: 2023-01-01

## 2023-01-01 RX ORDER — PIPERACILLIN AND TAZOBACTAM 4; .5 G/20ML; G/20ML
3.38 INJECTION, POWDER, LYOPHILIZED, FOR SOLUTION INTRAVENOUS ONCE
Refills: 0 | Status: DISCONTINUED | OUTPATIENT
Start: 2023-01-01 | End: 2023-01-01

## 2023-01-01 RX ORDER — LACTULOSE 10 G/15ML
20 SOLUTION ORAL THREE TIMES A DAY
Refills: 0 | Status: DISCONTINUED | OUTPATIENT
Start: 2023-01-01 | End: 2023-01-01

## 2023-01-01 RX ORDER — CHLORHEXIDINE GLUCONATE 213 G/1000ML
1 SOLUTION TOPICAL
Refills: 0 | Status: DISCONTINUED | OUTPATIENT
Start: 2023-01-01 | End: 2023-01-01

## 2023-01-01 RX ORDER — ASPIRIN/CALCIUM CARB/MAGNESIUM 324 MG
1 TABLET ORAL
Qty: 0 | Refills: 0 | DISCHARGE
Start: 2023-01-01

## 2023-01-01 RX ORDER — SODIUM CHLORIDE 9 MG/ML
1000 INJECTION, SOLUTION INTRAVENOUS
Refills: 0 | Status: DISCONTINUED | OUTPATIENT
Start: 2023-01-01 | End: 2023-01-01

## 2023-01-01 RX ORDER — METOPROLOL TARTRATE 50 MG
12.5 TABLET ORAL EVERY 8 HOURS
Refills: 0 | Status: DISCONTINUED | OUTPATIENT
Start: 2023-01-01 | End: 2023-01-01

## 2023-01-01 RX ORDER — TAMSULOSIN HYDROCHLORIDE 0.4 MG/1
1 CAPSULE ORAL
Refills: 0 | DISCHARGE

## 2023-01-01 RX ORDER — PANTOPRAZOLE SODIUM 20 MG/1
40 TABLET, DELAYED RELEASE ORAL DAILY
Refills: 0 | Status: DISCONTINUED | OUTPATIENT
Start: 2023-01-01 | End: 2023-01-01

## 2023-01-01 RX ORDER — DIGOXIN 250 MCG
125 TABLET ORAL EVERY OTHER DAY
Refills: 0 | Status: DISCONTINUED | OUTPATIENT
Start: 2023-01-01 | End: 2023-01-01

## 2023-01-01 RX ORDER — NOREPINEPHRINE BITARTRATE/D5W 8 MG/250ML
0.05 PLASTIC BAG, INJECTION (ML) INTRAVENOUS
Qty: 8 | Refills: 0 | Status: DISCONTINUED | OUTPATIENT
Start: 2023-01-01 | End: 2023-01-01

## 2023-01-01 RX ORDER — ACETAMINOPHEN 500 MG
650 TABLET ORAL EVERY 6 HOURS
Refills: 0 | Status: DISCONTINUED | OUTPATIENT
Start: 2023-01-01 | End: 2023-01-01

## 2023-01-01 RX ORDER — METOPROLOL TARTRATE 50 MG
25 TABLET ORAL DAILY
Refills: 0 | Status: DISCONTINUED | OUTPATIENT
Start: 2023-01-01 | End: 2023-01-01

## 2023-01-01 RX ORDER — ROBINUL 0.2 MG/ML
0.4 INJECTION INTRAMUSCULAR; INTRAVENOUS
Refills: 0 | Status: DISCONTINUED | OUTPATIENT
Start: 2023-01-01 | End: 2023-01-01

## 2023-01-01 RX ORDER — HEPARIN SODIUM 5000 [USP'U]/ML
5000 INJECTION INTRAVENOUS; SUBCUTANEOUS EVERY 12 HOURS
Refills: 0 | Status: DISCONTINUED | OUTPATIENT
Start: 2023-01-01 | End: 2023-01-01

## 2023-01-01 RX ORDER — METRONIDAZOLE 500 MG
TABLET ORAL
Refills: 0 | Status: DISCONTINUED | OUTPATIENT
Start: 2023-01-01 | End: 2023-01-01

## 2023-01-01 RX ORDER — CHLORHEXIDINE GLUCONATE 213 G/1000ML
15 SOLUTION TOPICAL EVERY 12 HOURS
Refills: 0 | Status: DISCONTINUED | OUTPATIENT
Start: 2023-01-01 | End: 2023-01-01

## 2023-01-01 RX ORDER — DIGOXIN 250 MCG
1 TABLET ORAL
Qty: 0 | Refills: 0 | DISCHARGE
Start: 2023-01-01

## 2023-01-01 RX ORDER — NOREPINEPHRINE BITARTRATE/D5W 8 MG/250ML
1 PLASTIC BAG, INJECTION (ML) INTRAVENOUS
Qty: 16 | Refills: 0 | Status: DISCONTINUED | OUTPATIENT
Start: 2023-01-01 | End: 2023-01-01

## 2023-01-01 RX ORDER — VASOPRESSIN 20 [USP'U]/ML
0.04 INJECTION INTRAVENOUS
Qty: 40 | Refills: 0 | Status: DISCONTINUED | OUTPATIENT
Start: 2023-01-01 | End: 2023-01-01

## 2023-01-01 RX ORDER — MAGNESIUM SULFATE 500 MG/ML
2 VIAL (ML) INJECTION ONCE
Refills: 0 | Status: COMPLETED | OUTPATIENT
Start: 2023-01-01 | End: 2023-01-01

## 2023-01-01 RX ORDER — ATORVASTATIN CALCIUM 80 MG/1
40 TABLET, FILM COATED ORAL AT BEDTIME
Refills: 0 | Status: DISCONTINUED | OUTPATIENT
Start: 2023-01-01 | End: 2023-01-01

## 2023-01-01 RX ORDER — LANOLIN ALCOHOL/MO/W.PET/CERES
3 CREAM (GRAM) TOPICAL AT BEDTIME
Refills: 0 | Status: DISCONTINUED | OUTPATIENT
Start: 2023-01-01 | End: 2023-01-01

## 2023-01-01 RX ORDER — FENTANYL CITRATE 50 UG/ML
1 INJECTION INTRAVENOUS
Qty: 2500 | Refills: 0 | Status: DISCONTINUED | OUTPATIENT
Start: 2023-01-01 | End: 2023-01-01

## 2023-01-01 RX ORDER — ONDANSETRON 8 MG/1
4 TABLET, FILM COATED ORAL EVERY 8 HOURS
Refills: 0 | Status: DISCONTINUED | OUTPATIENT
Start: 2023-01-01 | End: 2023-01-01

## 2023-01-01 RX ORDER — TAMSULOSIN HYDROCHLORIDE 0.4 MG/1
0.4 CAPSULE ORAL AT BEDTIME
Refills: 0 | Status: DISCONTINUED | OUTPATIENT
Start: 2023-01-01 | End: 2023-01-01

## 2023-01-01 RX ORDER — HEPARIN SODIUM 5000 [USP'U]/ML
5000 INJECTION INTRAVENOUS; SUBCUTANEOUS EVERY 8 HOURS
Refills: 0 | Status: DISCONTINUED | OUTPATIENT
Start: 2023-01-01 | End: 2023-01-01

## 2023-01-01 RX ORDER — SODIUM CHLORIDE 9 MG/ML
1000 INJECTION INTRAMUSCULAR; INTRAVENOUS; SUBCUTANEOUS ONCE
Refills: 0 | Status: COMPLETED | OUTPATIENT
Start: 2023-01-01 | End: 2023-01-01

## 2023-01-01 RX ORDER — ASPIRIN/CALCIUM CARB/MAGNESIUM 324 MG
81 TABLET ORAL DAILY
Refills: 0 | Status: DISCONTINUED | OUTPATIENT
Start: 2023-01-01 | End: 2023-01-01

## 2023-01-01 RX ORDER — ASCORBIC ACID 60 MG
500 TABLET,CHEWABLE ORAL DAILY
Refills: 0 | Status: DISCONTINUED | OUTPATIENT
Start: 2023-01-01 | End: 2023-01-01

## 2023-01-01 RX ORDER — PHENYLEPHRINE HYDROCHLORIDE 10 MG/ML
0.3 INJECTION INTRAVENOUS
Qty: 320 | Refills: 0 | Status: DISCONTINUED | OUTPATIENT
Start: 2023-01-01 | End: 2023-01-01

## 2023-01-01 RX ORDER — DIGOXIN 250 MCG
125 TABLET ORAL DAILY
Refills: 0 | Status: DISCONTINUED | OUTPATIENT
Start: 2023-01-01 | End: 2023-01-01

## 2023-01-01 RX ORDER — METRONIDAZOLE 500 MG
500 TABLET ORAL EVERY 8 HOURS
Refills: 0 | Status: DISCONTINUED | OUTPATIENT
Start: 2023-01-01 | End: 2023-01-01

## 2023-01-01 RX ORDER — SODIUM CHLORIDE 9 MG/ML
10 INJECTION INTRAMUSCULAR; INTRAVENOUS; SUBCUTANEOUS
Refills: 0 | Status: DISCONTINUED | OUTPATIENT
Start: 2023-01-01 | End: 2023-01-01

## 2023-01-01 RX ORDER — PROPOFOL 10 MG/ML
40 INJECTION, EMULSION INTRAVENOUS
Qty: 1000 | Refills: 0 | Status: DISCONTINUED | OUTPATIENT
Start: 2023-01-01 | End: 2023-01-01

## 2023-01-01 RX ORDER — HYDROMORPHONE HYDROCHLORIDE 2 MG/ML
0.5 INJECTION INTRAMUSCULAR; INTRAVENOUS; SUBCUTANEOUS
Refills: 0 | Status: DISCONTINUED | OUTPATIENT
Start: 2023-01-01 | End: 2023-01-01

## 2023-01-01 RX ORDER — ZINC OXIDE 200 MG/G
1 OINTMENT TOPICAL
Refills: 0 | DISCHARGE

## 2023-01-01 RX ORDER — SODIUM BICARBONATE 1 MEQ/ML
0.04 SYRINGE (ML) INTRAVENOUS
Qty: 50 | Refills: 0 | Status: DISCONTINUED | OUTPATIENT
Start: 2023-01-01 | End: 2023-01-01

## 2023-01-01 RX ORDER — CEFEPIME 1 G/1
2000 INJECTION, POWDER, FOR SOLUTION INTRAMUSCULAR; INTRAVENOUS EVERY 24 HOURS
Refills: 0 | Status: DISCONTINUED | OUTPATIENT
Start: 2023-01-01 | End: 2023-01-01

## 2023-01-01 RX ORDER — OLANZAPINE 15 MG/1
2.5 TABLET, FILM COATED ORAL EVERY 12 HOURS
Refills: 0 | Status: DISCONTINUED | OUTPATIENT
Start: 2023-01-01 | End: 2023-01-01

## 2023-01-01 RX ORDER — LIDOCAINE 4 G/100G
1 CREAM TOPICAL ONCE
Refills: 0 | Status: COMPLETED | OUTPATIENT
Start: 2023-01-01 | End: 2023-01-01

## 2023-01-01 RX ORDER — FERROUS SULFATE 325(65) MG
325 TABLET ORAL DAILY
Refills: 0 | Status: DISCONTINUED | OUTPATIENT
Start: 2023-01-01 | End: 2023-01-01

## 2023-01-01 RX ORDER — FENTANYL CITRATE 50 UG/ML
50 INJECTION INTRAVENOUS ONCE
Refills: 0 | Status: DISCONTINUED | OUTPATIENT
Start: 2023-01-01 | End: 2023-01-01

## 2023-01-01 RX ORDER — SODIUM BICARBONATE 1 MEQ/ML
0.02 SYRINGE (ML) INTRAVENOUS
Qty: 75 | Refills: 0 | Status: DISCONTINUED | OUTPATIENT
Start: 2023-01-01 | End: 2023-01-01

## 2023-01-01 RX ORDER — METOPROLOL TARTRATE 50 MG
12.5 TABLET ORAL EVERY 12 HOURS
Refills: 0 | Status: DISCONTINUED | OUTPATIENT
Start: 2023-01-01 | End: 2023-01-01

## 2023-01-01 RX ORDER — POLYETHYLENE GLYCOL 3350 17 G/17G
17 POWDER, FOR SOLUTION ORAL AT BEDTIME
Refills: 0 | Status: DISCONTINUED | OUTPATIENT
Start: 2023-01-01 | End: 2023-01-01

## 2023-01-01 RX ORDER — ASPIRIN/CALCIUM CARB/MAGNESIUM 324 MG
1 TABLET ORAL
Refills: 0 | DISCHARGE

## 2023-01-01 RX ORDER — IRON SUCROSE 20 MG/ML
200 INJECTION, SOLUTION INTRAVENOUS EVERY 24 HOURS
Refills: 0 | Status: COMPLETED | OUTPATIENT
Start: 2023-01-01 | End: 2023-01-01

## 2023-01-01 RX ORDER — METOPROLOL TARTRATE 50 MG
1 TABLET ORAL
Qty: 0 | Refills: 0 | DISCHARGE
Start: 2023-01-01

## 2023-01-01 RX ORDER — DIGOXIN 250 MCG
500 TABLET ORAL ONCE
Refills: 0 | Status: COMPLETED | OUTPATIENT
Start: 2023-01-01 | End: 2023-01-01

## 2023-01-01 RX ORDER — SIMVASTATIN 20 MG/1
1 TABLET, FILM COATED ORAL
Refills: 0 | DISCHARGE

## 2023-01-01 RX ORDER — ACETAMINOPHEN 500 MG
2 TABLET ORAL
Qty: 0 | Refills: 0 | DISCHARGE
Start: 2023-01-01

## 2023-01-01 RX ADMIN — SODIUM CHLORIDE 1000 MILLILITER(S): 9 INJECTION, SOLUTION INTRAVENOUS at 10:25

## 2023-01-01 RX ADMIN — HEPARIN SODIUM 5000 UNIT(S): 5000 INJECTION INTRAVENOUS; SUBCUTANEOUS at 21:33

## 2023-01-01 RX ADMIN — Medication 650 MILLIGRAM(S): at 08:17

## 2023-01-01 RX ADMIN — Medication 12.5 MILLIGRAM(S): at 05:48

## 2023-01-01 RX ADMIN — HEPARIN SODIUM 5000 UNIT(S): 5000 INJECTION INTRAVENOUS; SUBCUTANEOUS at 05:48

## 2023-01-01 RX ADMIN — Medication 81 MILLIGRAM(S): at 12:36

## 2023-01-01 RX ADMIN — Medication 81 MILLIGRAM(S): at 12:08

## 2023-01-01 RX ADMIN — Medication 25 MILLIGRAM(S): at 05:42

## 2023-01-01 RX ADMIN — CHLORHEXIDINE GLUCONATE 15 MILLILITER(S): 213 SOLUTION TOPICAL at 17:43

## 2023-01-01 RX ADMIN — Medication 650 MILLIGRAM(S): at 09:17

## 2023-01-01 RX ADMIN — TAMSULOSIN HYDROCHLORIDE 0.4 MILLIGRAM(S): 0.4 CAPSULE ORAL at 00:04

## 2023-01-01 RX ADMIN — Medication 650 MILLIGRAM(S): at 13:55

## 2023-01-01 RX ADMIN — TAMSULOSIN HYDROCHLORIDE 0.4 MILLIGRAM(S): 0.4 CAPSULE ORAL at 22:42

## 2023-01-01 RX ADMIN — PANTOPRAZOLE SODIUM 40 MILLIGRAM(S): 20 TABLET, DELAYED RELEASE ORAL at 12:00

## 2023-01-01 RX ADMIN — Medication 650 MILLIGRAM(S): at 23:40

## 2023-01-01 RX ADMIN — VASOPRESSIN 6 UNIT(S)/MIN: 20 INJECTION INTRAVENOUS at 10:00

## 2023-01-01 RX ADMIN — SODIUM CHLORIDE 1000 MILLILITER(S): 9 INJECTION INTRAMUSCULAR; INTRAVENOUS; SUBCUTANEOUS at 14:12

## 2023-01-01 RX ADMIN — Medication 12.5 MILLIGRAM(S): at 17:53

## 2023-01-01 RX ADMIN — HEPARIN SODIUM 5000 UNIT(S): 5000 INJECTION INTRAVENOUS; SUBCUTANEOUS at 21:37

## 2023-01-01 RX ADMIN — ATORVASTATIN CALCIUM 40 MILLIGRAM(S): 80 TABLET, FILM COATED ORAL at 21:36

## 2023-01-01 RX ADMIN — HEPARIN SODIUM 5000 UNIT(S): 5000 INJECTION INTRAVENOUS; SUBCUTANEOUS at 06:03

## 2023-01-01 RX ADMIN — LIDOCAINE 1 PATCH: 4 CREAM TOPICAL at 07:48

## 2023-01-01 RX ADMIN — HEPARIN SODIUM 5000 UNIT(S): 5000 INJECTION INTRAVENOUS; SUBCUTANEOUS at 17:53

## 2023-01-01 RX ADMIN — ATORVASTATIN CALCIUM 40 MILLIGRAM(S): 80 TABLET, FILM COATED ORAL at 00:06

## 2023-01-01 RX ADMIN — LACTULOSE 20 GRAM(S): 10 SOLUTION ORAL at 06:16

## 2023-01-01 RX ADMIN — Medication 500 MILLIGRAM(S): at 12:59

## 2023-01-01 RX ADMIN — Medication 650 MILLIGRAM(S): at 07:03

## 2023-01-01 RX ADMIN — Medication 650 MILLIGRAM(S): at 06:05

## 2023-01-01 RX ADMIN — ATORVASTATIN CALCIUM 40 MILLIGRAM(S): 80 TABLET, FILM COATED ORAL at 21:37

## 2023-01-01 RX ADMIN — SODIUM CHLORIDE 70 MILLILITER(S): 9 INJECTION, SOLUTION INTRAVENOUS at 00:06

## 2023-01-01 RX ADMIN — Medication 80.7 MICROGRAM(S)/KG/MIN: at 10:00

## 2023-01-01 RX ADMIN — Medication 7.78 MICROGRAM(S)/KG/MIN: at 06:00

## 2023-01-01 RX ADMIN — HEPARIN SODIUM 5000 UNIT(S): 5000 INJECTION INTRAVENOUS; SUBCUTANEOUS at 18:42

## 2023-01-01 RX ADMIN — Medication 25 GRAM(S): at 09:51

## 2023-01-01 RX ADMIN — ATORVASTATIN CALCIUM 40 MILLIGRAM(S): 80 TABLET, FILM COATED ORAL at 23:02

## 2023-01-01 RX ADMIN — Medication 81 MILLIGRAM(S): at 11:49

## 2023-01-01 RX ADMIN — SODIUM CHLORIDE 75 MILLILITER(S): 9 INJECTION, SOLUTION INTRAVENOUS at 19:14

## 2023-01-01 RX ADMIN — Medication 325 MILLIGRAM(S): at 13:49

## 2023-01-01 RX ADMIN — Medication 25 MILLIGRAM(S): at 06:10

## 2023-01-01 RX ADMIN — POLYETHYLENE GLYCOL 3350 17 GRAM(S): 17 POWDER, FOR SOLUTION ORAL at 21:37

## 2023-01-01 RX ADMIN — Medication 650 MILLIGRAM(S): at 05:32

## 2023-01-01 RX ADMIN — TAMSULOSIN HYDROCHLORIDE 0.4 MILLIGRAM(S): 0.4 CAPSULE ORAL at 21:51

## 2023-01-01 RX ADMIN — Medication 80.7 MICROGRAM(S)/KG/MIN: at 14:15

## 2023-01-01 RX ADMIN — ATORVASTATIN CALCIUM 40 MILLIGRAM(S): 80 TABLET, FILM COATED ORAL at 21:51

## 2023-01-01 RX ADMIN — Medication 12.5 MILLIGRAM(S): at 21:33

## 2023-01-01 RX ADMIN — ATORVASTATIN CALCIUM 40 MILLIGRAM(S): 80 TABLET, FILM COATED ORAL at 22:42

## 2023-01-01 RX ADMIN — SODIUM CHLORIDE 1000 MILLILITER(S): 9 INJECTION, SOLUTION INTRAVENOUS at 13:15

## 2023-01-01 RX ADMIN — ATORVASTATIN CALCIUM 40 MILLIGRAM(S): 80 TABLET, FILM COATED ORAL at 22:41

## 2023-01-01 RX ADMIN — HEPARIN SODIUM 5000 UNIT(S): 5000 INJECTION INTRAVENOUS; SUBCUTANEOUS at 13:15

## 2023-01-01 RX ADMIN — ETOMIDATE 20 MILLIGRAM(S): 2 INJECTION INTRAVENOUS at 06:00

## 2023-01-01 RX ADMIN — Medication 650 MILLIGRAM(S): at 22:41

## 2023-01-01 RX ADMIN — Medication 81 MILLIGRAM(S): at 11:23

## 2023-01-01 RX ADMIN — Medication 12.5 MILLIGRAM(S): at 14:57

## 2023-01-01 RX ADMIN — SODIUM CHLORIDE 70 MILLILITER(S): 9 INJECTION, SOLUTION INTRAVENOUS at 07:00

## 2023-01-01 RX ADMIN — IRON SUCROSE 110 MILLIGRAM(S): 20 INJECTION, SOLUTION INTRAVENOUS at 17:19

## 2023-01-01 RX ADMIN — TAMSULOSIN HYDROCHLORIDE 0.4 MILLIGRAM(S): 0.4 CAPSULE ORAL at 23:02

## 2023-01-01 RX ADMIN — ROBINUL 0.4 MILLIGRAM(S): 0.2 INJECTION INTRAMUSCULAR; INTRAVENOUS at 18:47

## 2023-01-01 RX ADMIN — PROPOFOL 20.7 MICROGRAM(S)/KG/MIN: 10 INJECTION, EMULSION INTRAVENOUS at 07:25

## 2023-01-01 RX ADMIN — POLYETHYLENE GLYCOL 3350 17 GRAM(S): 17 POWDER, FOR SOLUTION ORAL at 21:51

## 2023-01-01 RX ADMIN — HEPARIN SODIUM 5000 UNIT(S): 5000 INJECTION INTRAVENOUS; SUBCUTANEOUS at 21:51

## 2023-01-01 RX ADMIN — LACTULOSE 20 GRAM(S): 10 SOLUTION ORAL at 05:14

## 2023-01-01 RX ADMIN — Medication 500 MICROGRAM(S): at 13:04

## 2023-01-01 RX ADMIN — IRON SUCROSE 110 MILLIGRAM(S): 20 INJECTION, SOLUTION INTRAVENOUS at 12:39

## 2023-01-01 RX ADMIN — Medication 500 MILLIGRAM(S): at 11:22

## 2023-01-01 RX ADMIN — CHLORHEXIDINE GLUCONATE 1 APPLICATION(S): 213 SOLUTION TOPICAL at 09:09

## 2023-01-01 RX ADMIN — Medication 12.5 MILLIGRAM(S): at 21:38

## 2023-01-01 RX ADMIN — Medication 500 MILLIGRAM(S): at 11:48

## 2023-01-01 RX ADMIN — SODIUM CHLORIDE 70 MILLILITER(S): 9 INJECTION, SOLUTION INTRAVENOUS at 18:11

## 2023-01-01 RX ADMIN — Medication 81 MILLIGRAM(S): at 15:37

## 2023-01-01 RX ADMIN — TAMSULOSIN HYDROCHLORIDE 0.4 MILLIGRAM(S): 0.4 CAPSULE ORAL at 22:41

## 2023-01-01 RX ADMIN — HEPARIN SODIUM 5000 UNIT(S): 5000 INJECTION INTRAVENOUS; SUBCUTANEOUS at 06:10

## 2023-01-01 RX ADMIN — HEPARIN SODIUM 5000 UNIT(S): 5000 INJECTION INTRAVENOUS; SUBCUTANEOUS at 14:47

## 2023-01-01 RX ADMIN — HEPARIN SODIUM 5000 UNIT(S): 5000 INJECTION INTRAVENOUS; SUBCUTANEOUS at 14:00

## 2023-01-01 RX ADMIN — Medication 125 MICROGRAM(S): at 11:48

## 2023-01-01 RX ADMIN — LACTULOSE 20 GRAM(S): 10 SOLUTION ORAL at 23:02

## 2023-01-01 RX ADMIN — HEPARIN SODIUM 5000 UNIT(S): 5000 INJECTION INTRAVENOUS; SUBCUTANEOUS at 14:11

## 2023-01-01 RX ADMIN — SODIUM CHLORIDE 75 MILLILITER(S): 9 INJECTION, SOLUTION INTRAVENOUS at 12:43

## 2023-01-01 RX ADMIN — Medication 500 MILLIGRAM(S): at 13:50

## 2023-01-01 RX ADMIN — Medication 125 MICROGRAM(S): at 12:10

## 2023-01-01 RX ADMIN — Medication 650 MILLIGRAM(S): at 06:32

## 2023-01-01 RX ADMIN — LIDOCAINE 1 PATCH: 4 CREAM TOPICAL at 03:29

## 2023-01-01 RX ADMIN — HEPARIN SODIUM 5000 UNIT(S): 5000 INJECTION INTRAVENOUS; SUBCUTANEOUS at 05:15

## 2023-01-01 RX ADMIN — Medication 500 MILLIGRAM(S): at 11:23

## 2023-01-01 RX ADMIN — CEFEPIME 100 MILLIGRAM(S): 1 INJECTION, POWDER, FOR SOLUTION INTRAMUSCULAR; INTRAVENOUS at 10:26

## 2023-01-01 RX ADMIN — IRON SUCROSE 110 MILLIGRAM(S): 20 INJECTION, SOLUTION INTRAVENOUS at 12:56

## 2023-01-01 RX ADMIN — ATORVASTATIN CALCIUM 40 MILLIGRAM(S): 80 TABLET, FILM COATED ORAL at 21:33

## 2023-01-01 RX ADMIN — HEPARIN SODIUM 5000 UNIT(S): 5000 INJECTION INTRAVENOUS; SUBCUTANEOUS at 05:40

## 2023-01-01 RX ADMIN — HEPARIN SODIUM 5000 UNIT(S): 5000 INJECTION INTRAVENOUS; SUBCUTANEOUS at 05:39

## 2023-01-01 RX ADMIN — HEPARIN SODIUM 5000 UNIT(S): 5000 INJECTION INTRAVENOUS; SUBCUTANEOUS at 00:05

## 2023-01-01 RX ADMIN — Medication 12.5 MILLIGRAM(S): at 05:40

## 2023-01-01 RX ADMIN — HEPARIN SODIUM 5000 UNIT(S): 5000 INJECTION INTRAVENOUS; SUBCUTANEOUS at 19:14

## 2023-01-01 RX ADMIN — HEPARIN SODIUM 5000 UNIT(S): 5000 INJECTION INTRAVENOUS; SUBCUTANEOUS at 14:58

## 2023-01-01 RX ADMIN — HEPARIN SODIUM 5000 UNIT(S): 5000 INJECTION INTRAVENOUS; SUBCUTANEOUS at 06:09

## 2023-01-01 RX ADMIN — LACTULOSE 20 GRAM(S): 10 SOLUTION ORAL at 05:39

## 2023-01-01 RX ADMIN — LACTULOSE 20 GRAM(S): 10 SOLUTION ORAL at 15:24

## 2023-01-01 RX ADMIN — TAMSULOSIN HYDROCHLORIDE 0.4 MILLIGRAM(S): 0.4 CAPSULE ORAL at 21:36

## 2023-01-01 RX ADMIN — HEPARIN SODIUM 5000 UNIT(S): 5000 INJECTION INTRAVENOUS; SUBCUTANEOUS at 21:36

## 2023-01-01 RX ADMIN — HEPARIN SODIUM 5000 UNIT(S): 5000 INJECTION INTRAVENOUS; SUBCUTANEOUS at 05:42

## 2023-01-01 RX ADMIN — Medication 12.5 MILLIGRAM(S): at 06:03

## 2023-01-01 RX ADMIN — Medication 650 MILLIGRAM(S): at 13:19

## 2023-01-01 RX ADMIN — LACTULOSE 20 GRAM(S): 10 SOLUTION ORAL at 14:52

## 2023-01-01 RX ADMIN — Medication 500 MILLIGRAM(S): at 12:36

## 2023-01-01 RX ADMIN — TAMSULOSIN HYDROCHLORIDE 0.4 MILLIGRAM(S): 0.4 CAPSULE ORAL at 22:02

## 2023-01-01 RX ADMIN — SODIUM CHLORIDE 1000 MILLILITER(S): 9 INJECTION INTRAMUSCULAR; INTRAVENOUS; SUBCUTANEOUS at 15:45

## 2023-01-01 RX ADMIN — Medication 12.5 MILLIGRAM(S): at 13:15

## 2023-01-01 RX ADMIN — Medication 81 MILLIGRAM(S): at 13:00

## 2023-01-01 RX ADMIN — HEPARIN SODIUM 5000 UNIT(S): 5000 INJECTION INTRAVENOUS; SUBCUTANEOUS at 17:43

## 2023-01-01 RX ADMIN — Medication 12.5 MILLIGRAM(S): at 05:15

## 2023-01-01 RX ADMIN — SODIUM CHLORIDE 1000 MILLILITER(S): 9 INJECTION, SOLUTION INTRAVENOUS at 11:00

## 2023-01-01 RX ADMIN — TAMSULOSIN HYDROCHLORIDE 0.4 MILLIGRAM(S): 0.4 CAPSULE ORAL at 21:37

## 2023-01-01 RX ADMIN — Medication 3 MILLIGRAM(S): at 22:49

## 2023-01-01 RX ADMIN — Medication 81 MILLIGRAM(S): at 11:22

## 2023-01-01 RX ADMIN — Medication 12.5 MILLIGRAM(S): at 18:42

## 2023-01-01 RX ADMIN — Medication 100 MILLIGRAM(S): at 14:30

## 2023-11-17 NOTE — ED ADULT NURSE REASSESSMENT NOTE - NS ED NURSE REASSESS COMMENT FT1
This nurse assess pt ,pt is ax4 ,on room air, pt is aware of POC, pt reports not needing anything at the moment, pt rates mild pain.

## 2023-11-17 NOTE — ED PROVIDER NOTE - CLINICAL SUMMARY MEDICAL DECISION MAKING FREE TEXT BOX
89-year-old male with past medical history of hypertension, hyperlipidemia coming in with pain in left lower extremity.  Having difficulty bearing weight.  Patient reports that he was chasing a mouse around the house yesterday when he lost his balance and fell backward on his buttocks.  Unsure if he hit his head.  Denies LOC.  Lives alone.  Does not use a cane or a walker to ambulate at home.  Reports he is now unable to walk due to pain.  Did not take anything for pain.  Patient is well-appearing.  No distress.  No spinal tenderness to palpation.  No paraspinal tenderness to palpation.  No TTP of the chest, abdomen.  Patient has full range of motion in both lower extremities.  No overlying skin changes noted.  Positive TTP at the proximal aspect of tib-fib, ankle at medial and lateral aspect.  Noted swelling of the ankle without erythema.  Differential diagnoses include but not limited to ankle sprain, fracture.  Patient is not on any blood thinners however since patient is unsure if he hit his head will obtain CT head.    Pt is unable to bear weight on his - will admit. Pt is noted to have questionable aneurysm - cta head and neck ordered - endorsed to the hosptialist and MAR.

## 2023-11-17 NOTE — ED ADULT NURSE NOTE - OBJECTIVE STATEMENT
Pt arrived to ED via EMS from home, c/o fell last night at 2300 , while trying to stomp on a mouse   Denies LOC, c/o Lt knee, thigh and foot pain , states was unable to bear weight on LLE today due to pain and numbness

## 2023-11-17 NOTE — H&P ADULT - ATTENDING COMMENTS
Full H/P to follow.  89 yo M from home, PMHx of HTN, HLD, BPH, presenting after an episode of fall. His nephew is at bedside, providing collaterals.  Patient lives alone with assistance of his nephew, and is very functional at baseline, although the gait is somewhat unsteady so nephew had suggested using a cane but he had refused. Has no HHA.   This morning, patient lost balance and fell at home, denies loss of consciousness, chest pain, SOB, headache, nausea before, during, and after the episode. Reports that he just tripped over. He called the nephew and let him know that he fell. Nephew came over and saw him in the bed (pt got up and went back to bed on his own), called 911. He has pain in his L knee and L shin since the fall, but also states that the pain might have been there yesterday even before the fall. Also states that he has had bilateral leg numbness that comes and goes for about 6 months. Denies back pain.    Home meds:   Ergocalciferol, losartan 100mg, Lopressor 100mg BID, Simvastatin 40mg, Flomax 0.4mg. Does not check BP at home but it was reportedly normal when he visited his PCP last time.  NKDA, drinks alcohol socially, no drug use. Wife passed, no kids.    On exam, patient is AOx3, NAD, cardiopulmonary exams unremarkable, abdomen soft, NT/ND, extremities without edema. Has tenderness to palpation in L knee and shin, but no bruises or deformity noted. No focal deficit, strength 5/5 in upper/lower extremities bl.    Labs, EKG pending.  CT head and neck reviewed, no acute pathologies. possible ICA aneurysm noted, pending CTA head.     Assessment and plan:  89 yo M from home, PMHx of HTN, HLD, BPH, presenting after an episode of fall.     # Mechanical fall  Patient provides clear history, denies LOC, low suspicion of syncope/seizure. Imagings without overt fracture.  - f/u labs, EKG  - low suspicion of syncope, would not need telemetry  - PT evaluation  - tylenol PRN for pain after fall    # Macrocytic anemia  CBC with hgb 9.7 with   - send vitamin B12,     # Bilateral leg numbness lasting for 6 months  Denies back pain, likely peripheral neuropathy given the nature. DDx lumbar stenosis.  - check A1c  - send LFT to confirm albumin and total protein  - f/u BMP for Cr    # HTN  # HLD  # BPH  - hold home HTN meds, monitor BP  - continue home Simvastatin, Flomax    # DVT ppx  - pending Cr Full H/P to follow.  89 yo M from home, PMHx of HTN, HLD, BPH, presenting after an episode of fall. His nephew is at bedside, providing collaterals.  Patient lives alone with assistance of his nephew, and is very functional at baseline, although the gait is somewhat unsteady so nephew had suggested using a cane but he had refused. Has no HHA.   This morning, patient lost balance and fell at home, denies loss of consciousness, chest pain, SOB, headache, nausea before, during, and after the episode. Reports that he just tripped over. He called the nephew and let him know that he fell. Nephew came over and saw him in the bed (pt got up and went back to bed on his own), called 911. He has pain in his L knee and L shin since the fall, but also states that the pain might have been there yesterday even before the fall. Also states that he has had bilateral leg numbness that comes and goes for about 6 months. Denies back pain.    Home meds:   Ergocalciferol, losartan 100mg, Lopressor 100mg BID, Simvastatin 40mg, Flomax 0.4mg. Does not check BP at home but it was reportedly normal when he visited his PCP last time.  NKDA, drinks alcohol socially, no drug use. Wife passed, no kids.    On exam, patient is AOx3, NAD, cardiopulmonary exams unremarkable, abdomen soft, NT/ND, extremities without edema. Has tenderness to palpation in L knee and shin, but no bruises or deformity noted. No focal deficit, strength 5/5 in upper/lower extremities bl.    Labs reviewed, CBC with microcytic anemia, BMP with Cr rise, LFT grossly normal.  EKG pending.  CT head and neck reviewed, no acute pathologies. possible ICA aneurysm noted, pending CTA head.   Pending CX-ray final read by radiology.    Assessment and plan:  89 yo M from home, PMHx of HTN, HLD, BPH, presenting after an episode of fall. Found to have chronic bilateral lower extremity numbness, anemia, and Cr rise.    # Mechanical fall  Patient provides clear history, denies LOC, low suspicion of syncope/seizure. Imagings without overt fracture.  - f/u EKG  - low suspicion of syncope, would not need telemetry  - PT evaluation  - tylenol PRN for pain after fall    # Macrocytic anemia  # Bilateral leg numbness lasting for 6 months  # VAMSI vs CKD  Workup with CBC with hgb 9.7 with , Cr 2.00, unclear is VAMSI vs CKD but suspect he has chronic Cr rise. Taken together along with age, will need workup for myeloma contributing to peripheral neuropathy, predisposing him to have a fall episode.  - send vitamin B12 to rule out deficiency  - check A1c  - Renal US  - hold home losartan iso Cr rise  - daily BMP  - send SPEP/UPEP, kappa/lambda ratio, UA with urine protein/Cr ratio  - consult hematology    # HTN  # HLD  # BPH  - hold home HTN meds, monitor BP  - continue home Simvastatin, Flomax    # DVT ppx  - heparin SQ Full H/P to follow.  89 yo M from home, PMHx of HTN, HLD, BPH, presenting after an episode of fall. His nephew is at bedside, providing collaterals.  Patient lives alone with assistance of his nephew, and is very functional at baseline, although the gait is somewhat unsteady so nephew had suggested using a cane but he had refused. Has no HHA.   This morning, patient lost balance and fell at home, denies loss of consciousness, chest pain, SOB, headache, nausea before, during, and after the episode. Reports that he just tripped over. He called the nephew and let him know that he fell. Nephew came over and saw him in the bed (pt got up and went back to bed on his own), called 911. He has pain in his L knee and L shin since the fall, but also states that the pain might have been there yesterday even before the fall. Also states that he has had bilateral leg numbness that comes and goes for about 6 months. Denies back pain.    Home meds:   Ergocalciferol, losartan 100mg, Lopressor 100mg BID, Simvastatin 40mg, Flomax 0.4mg. Does not check BP at home but it was reportedly normal when he visited his PCP last time.  NKDA, drinks alcohol socially, no drug use. Wife passed, no kids.    On exam, patient is AOx3, NAD, cardiopulmonary exams unremarkable, abdomen soft, NT/ND, extremities without edema. Has tenderness to palpation in L knee and shin, but no bruises or deformity noted. No focal deficit, strength 5/5 in upper/lower extremities bl.    Labs reviewed, CBC with microcytic anemia, BMP with Cr rise, LFT grossly normal.  EKG pending.  CT head and neck reviewed, no acute pathologies. possible ICA aneurysm noted, pending CTA head.   Pending CX-ray final read by radiology.    Assessment and plan:  89 yo M from home, PMHx of HTN, HLD, BPH, presenting after an episode of fall. Found to have chronic bilateral lower extremity numbness, anemia, and Cr rise.    # Mechanical fall  Patient provides clear history, denies LOC, low suspicion of syncope/seizure. Imagings without overt fracture.  - f/u EKG  - low suspicion of syncope, would not need telemetry  - PT evaluation  - tylenol PRN for pain after fall    # Macrocytic anemia  # Bilateral leg numbness lasting for 6 months  # VAMSI vs CKD  Workup with CBC with hgb 9.7 with , Cr 2.00, unclear is VAMSI vs CKD but suspect he has chronic Cr rise. Taken together along with age, will need workup for myeloma contributing to peripheral neuropathy, predisposing him to have a fall episode.  - send vitamin B12 to rule out deficiency  - check A1c  - Renal US  - give 1L IVF for possible VAMSI  - hold home losartan iso Cr rise  - daily BMP  - send SPEP/UPEP, kappa/lambda ratio, UA with urine protein/Cr ratio  - consult hematology    # HTN  # HLD  # BPH  - hold home HTN meds, monitor BP  - continue home Simvastatin, Flomax    # DVT ppx  - heparin SQ Full H/P to follow.  89 yo M from home, PMHx of HTN, HLD, BPH, presenting after an episode of fall. His nephew is at bedside, providing collaterals.  Patient lives alone with assistance of his nephew, and is very functional at baseline, although the gait is somewhat unsteady so nephew had suggested using a cane but he had refused. Has no HHA.   This morning, patient lost balance and fell at home, denies loss of consciousness, chest pain, SOB, headache, nausea before, during, and after the episode. Reports that he just tripped over. He called the nephew and let him know that he fell. Nephew came over and saw him in the bed (pt got up and went back to bed on his own), called 911. He has pain in his L knee and L shin since the fall, but also states that the pain might have been there yesterday even before the fall. Also states that he has had bilateral leg numbness that comes and goes for about 6 months. Denies back pain.    Home meds:   Ergocalciferol, losartan 100mg, Lopressor 100mg BID, Simvastatin 40mg, Flomax 0.4mg. Does not check BP at home but it was reportedly normal when he visited his PCP last time.  NKDA, drinks alcohol socially, no drug use. Wife passed, no kids.    On exam, patient is AOx3, NAD, cardiopulmonary exams unremarkable, abdomen soft, NT/ND, extremities without edema. Has tenderness to palpation in L knee and shin, but no bruises or deformity noted. No focal deficit, strength 5/5 in upper/lower extremities bl.    Labs reviewed, CBC with microcytic anemia, BMP with Cr rise, LFT grossly normal.  EKG pending.  CT head and neck reviewed, no acute pathologies. possible ICA aneurysm noted, pending CTA head.   Pending CX-ray final read by radiology.  EKG notable for AVB.    Assessment and plan:  89 yo M from home, PMHx of HTN, HLD, BPH, presenting after an episode of fall. Found to have chronic bilateral lower extremity numbness, anemia, and Cr rise.    # Mechanical fall  Patient provides clear history, denies LOC, low suspicion of syncope/seizure. Imagings without overt fracture.  - telemetry  - PT evaluation  - tylenol PRN for pain after fall    # Macrocytic anemia  # Bilateral leg numbness lasting for 6 months  # VAMSI vs CKD  # AVB  Workup with CBC with hgb 9.7 with , Cr 2.00, unclear is VAMSI vs CKD but suspect he has chronic Cr rise. EKG with AVB. Taken together along with age, will need workup for myeloma a/w amyloidosis contributing to peripheral neuropathy, predisposing to a fall episode.  - send vitamin B12 to rule out deficiency  - check A1c  - Renal US  - give 1L IVF for possible VAMSI  - hold home losartan iso Cr rise  - daily BMP  - send SPEP/UPEP, kappa/lambda ratio, UA with urine protein/Cr ratio  - consult hematology    # HTN  # HLD  # BPH  - hold home HTN meds, monitor BP  - continue home Simvastatin, Flomax    # DVT ppx  - heparin SQ 89 yo M from home, PMHx of HTN, HLD, BPH, presenting after an episode of fall. His nephew is at bedside, providing collaterals.  Patient lives alone with assistance of his nephew, and is very functional at baseline, although the gait is somewhat unsteady so nephew had suggested using a cane but he had refused. Has no HHA.   This morning, patient lost balance and fell at home, denies loss of consciousness, chest pain, SOB, headache, nausea before, during, and after the episode. Reports that he just tripped over. He called the nephew and let him know that he fell. Nephew came over and saw him in the bed (pt got up and went back to bed on his own), called 911. He has pain in his L knee and L shin since the fall, but also states that the pain might have been there yesterday even before the fall. Also states that he has had bilateral leg numbness that comes and goes for about 6 months. Denies back pain.    Home meds:   Ergocalciferol, losartan 100mg, Lopressor 100mg BID, Simvastatin 40mg, Flomax 0.4mg. Does not check BP at home but it was reportedly normal when he visited his PCP last time.  NKDA, drinks alcohol socially, no drug use. Wife passed, no kids.    On exam, patient is AOx3, NAD, cardiopulmonary exams unremarkable, abdomen soft, NT/ND, extremities without edema. Has tenderness to palpation in L knee and shin, but no bruises or deformity noted. No focal deficit, strength 5/5 in upper/lower extremities bl.    Labs reviewed, CBC with microcytic anemia, BMP with Cr rise, LFT grossly normal.  EKG pending.  CT head and neck reviewed, no acute pathologies. possible ICA aneurysm noted, pending CTA head.   Pending CX-ray final read by radiology.  EKG notable for AVB.    Assessment and plan:  89 yo M from home, PMHx of HTN, HLD, BPH, presenting after an episode of fall. Found to have chronic bilateral lower extremity numbness, anemia, and Cr rise.    # Mechanical fall  Patient provides clear history, denies LOC, low suspicion of syncope/seizure. Imagings without overt fracture.  - telemetry  - PT evaluation  - tylenol PRN for pain after fall    # Macrocytic anemia  # Bilateral leg numbness lasting for 6 months  # VAMSI vs CKD  # AVB  Workup with CBC with hgb 9.7 with , Cr 2.00, unclear is VAMSI vs CKD but suspect he has chronic Cr rise. EKG with AVB. Taken together along with age, will need workup for myeloma a/w amyloidosis contributing to peripheral neuropathy, predisposing to a fall episode.  - send vitamin B12 to rule out deficiency  - check A1c  - Renal US  - give 1L IVF for possible VAMSI  - hold home losartan iso Cr rise  - daily BMP  - send SPEP/UPEP, kappa/lambda ratio, UA with urine protein/Cr ratio  - consult hematology    # HTN  # HLD  # BPH  - hold home HTN meds, monitor BP  - continue home Simvastatin, Flomax    # DVT ppx  - heparin SQ

## 2023-11-17 NOTE — ED ADULT NURSE REASSESSMENT NOTE - NS ED NURSE REASSESS COMMENT FT1
This nurse assess pt, pt is ax4, on room air, pt went to the restroom in a wheelchair w/ assistance, no other concerns noted. pt connected to the cardiac monitor.

## 2023-11-17 NOTE — ED ADULT NURSE NOTE - NSFALLHARMRISKINTERV_ED_ALL_ED
Assistance OOB with selected safe patient handling equipment if applicable/Assistance with ambulation/Communicate risk of Fall with Harm to all staff, patient, and family/Monitor gait and stability/Provide visual cue: red socks, yellow wristband, yellow gown, etc/Reinforce activity limits and safety measures with patient and family/Bed in lowest position, wheels locked, appropriate side rails in place/Call bell, personal items and telephone in reach/Instruct patient to call for assistance before getting out of bed/chair/stretcher/Non-slip footwear applied when patient is off stretcher/[place] to call system/Physically safe environment - no spills, clutter or unnecessary equipment/Purposeful Proactive Rounding/Room/bathroom lighting operational, light cord in reach

## 2023-11-17 NOTE — H&P ADULT - NSHPPHYSICALEXAM_GEN_ALL_CORE
T(C): 37 (11-17-23 @ 15:50), Max: 37 (11-17-23 @ 15:50)  T(F): 98.6 (11-17-23 @ 15:50), Max: 98.6 (11-17-23 @ 15:50)  HR: 79 (11-17-23 @ 15:50) (78 - 79)  BP: 127/63 (11-17-23 @ 15:50) (127/63 - 148/67)  RR: 18 (11-17-23 @ 15:50) (18 - 18)  SpO2: 96% (11-17-23 @ 15:50) (96% - 98%)    GENERAL: NAD; lying in bed; elderly  HEAD:  Atraumatic, Normocephalic  EYES: PERRLA, conjunctiva and sclera clear  ENMT: No tonsillar erythema, exudates, or enlargement;   NECK: Supple, normal appearance, No JVD;   NERVOUS SYSTEM:  Alert & Oriented X3,  non focal  CHEST/LUNG: Lungs clear to auscultation bilaterally, No rales, rhonchi, wheezing   HEART: Regular rate and rhythm; No murmurs, rubs, or gallops  ABDOMEN: Soft, Nontender, Nondistended; Bowel sounds present  EXTREMITIES:  2+ Peripheral Pulses, No clubbing, cyanosis, or edema; left knee non tender   SKIN: No rashes or lesions;

## 2023-11-17 NOTE — H&P ADULT - PROBLEM SELECTOR PLAN 5
- Patient with history of HLD  - Patient on Simvastatin 40mg at home  - Continue home antihyperlipidemic

## 2023-11-17 NOTE — H&P ADULT - PROBLEM SELECTOR PLAN 2
- SCr of 2 on admission  - Unclear baseline  - denies history of kidney injury - SCr of 2 on admission  - Unclear baseline  - denies history of kidney injury  - IV Fluids  - F/U Renal US  - F/U BMP daily   - F/U SPEP/UPEP, kappa/lambda ratio, UA with urine protein/Cr ratio  - Heme/Onc consulted [facility] for concern for myeloma

## 2023-11-17 NOTE — H&P ADULT - HISTORY OF PRESENT ILLNESS
Patient is a 89 year old male from home, ambulates independently, with PMH of HTN, HLD, and BPH who presented to the ED after falling at home.  Patient states he was running     Patient denies nausea, vomiting, headache, blurry vision, dizziness, chest pain, abdominal pain, constipation, diarrhea, leg swelling.  Patient is a 89 year old male from home, ambulates independently, with PMH of HTN, HLD, and BPH who presented to the ED after falling at home.  Patient states he was running to catch a mouse at home when he tripped at fell over.  Pateint states he was not dizzy prior to falling.  Patient denies head injury or LOC.  Patients nephew at bedside assisting with history.  Patient was recommended cane or walker due to dizzy episodes however patient previously refused.  Patient states he has episodes of dizziness when going from lying to sitting or sitting to standing.  Pateint states he did not have dizziness today and just lost balance while running after the mouse.  Patient now reports mild left knee pain.  Pateint states it is difficult for him to ambulate with is left knee.  Patient denies nausea, vomiting, headache, blurry vision, dizziness, chest pain, abdominal pain, constipation, diarrhea, leg swelling.

## 2023-11-17 NOTE — H&P ADULT - PROBLEM SELECTOR PLAN 1
[FreeTextEntry1] : Reviewed and reconciled medications, allergies, PMHx, PSHx, SocHx, FMHx \par \par Pt. with h/o recurrent sinus infections - multiple times a year, PND, and tonsillitis treated with 2 courses of abx presents today s/p right maxillary antrostomy with removal of contents, adenoidectomy, and b/l electro SMR turbs. Patient presents stating \par \par Physical Exam:\par -some scabbing of the turbinates, blood, mucus, and packing in the nose. Removed with suction. \par \par Plan: Start using sinus rinse\par Finish antibiotics\par Follow up in 2 weeks.  - presented after episode of falling at home  - hemdynmically stable and afebrile  - EKG showing 1st degree  - Admit to telemetry  - F/u orthostatic vitals  - CT Head without ischemia or hemorrage, f/u CTA head  - PT consulted  - Pain control with Acetaminophen

## 2023-11-17 NOTE — H&P ADULT - PROBLEM SELECTOR PLAN 4
- Patient with history of HTN  - Patient on Lopressor 100mg BID, Losartan 100mg at home  - Hold antihypertensives for now

## 2023-11-17 NOTE — H&P ADULT - ASSESSMENT
Patient is a 89 year old male from home, ambulates independently, with PMH of HTN, HLD, and BPH who presented to the ED after falling at home.  Patient with CT Head in ED without acute ischemia or hemorrage.  F/U CTA head.  Patients lab work showing VAMSI, anemia.  EKG Showing 1st degree AV block. .  Patient is being admitted to tele for mechical fall with 1st degree av block

## 2023-11-18 NOTE — PHYSICAL THERAPY INITIAL EVALUATION ADULT - PERTINENT HX OF CURRENT PROBLEM, REHAB EVAL
Pt admitted s/p mechanical fall, with c/o dizziness and left thigh/knee pain. X-ray of left knee/tibia/fibula and ankle are negative for acute fx

## 2023-11-18 NOTE — PROGRESS NOTE ADULT - PROBLEM SELECTOR PLAN 5
- Patient with history of HLD  - Patient on Simvastatin 40mg at home  - Continue home antihyperlipidemic - Patient with history of HTN  - Patient on Lopressor 100mg BID, Losartan 100mg at home  - Hold antihypertensives for now for soft BPs

## 2023-11-18 NOTE — PROGRESS NOTE ADULT - PROBLEM SELECTOR PLAN 6
- Continue home Tamsulosin - Patient with history of HLD  - Patient on Simvastatin 40mg at home  - Continue home antihyperlipidemic

## 2023-11-18 NOTE — PROGRESS NOTE ADULT - PROBLEM SELECTOR PLAN 3
- Hb of 9.7  - MCV of 102  - F/I Vitamin b12 and folate hx of BPH on flomax  - SCr of 2 on admission  - Unclear baseline  - denies history of kidney injury  - c/w IV Fluids  - Mg2+ 1.3-->replace with 2g IV  - Trend sCr 2.0-->1.7  - F/U post-void bladder scan and Renal US  - F/U BMP daily   - F/U SPEP/UPEP, kappa/lambda ratio, UA with urine lytes  - Heme/Onc consulted [facility] for concern for myeloma

## 2023-11-18 NOTE — PROGRESS NOTE ADULT - PROBLEM SELECTOR PLAN 4
- Patient with history of HTN  - Patient on Lopressor 100mg BID, Losartan 100mg at home  - Hold antihypertensives for now - Hb of 9.7  - MCV of 102  - F/U iron studies, Vitamin b12 and folate  - heme/onc consulted

## 2023-11-18 NOTE — PHYSICAL THERAPY INITIAL EVALUATION ADULT - CRITERIA FOR SKILLED THERAPEUTIC INTERVENTIONS
MINDY/impairments found/functional limitations in following categories/risk reduction/prevention/anticipated discharge recommendation

## 2023-11-18 NOTE — PROGRESS NOTE ADULT - ASSESSMENT
Patient is a 89 year old male from home, ambulates independently, with PMH of HTN, HLD, and BPH who presented to the ED after falling at home.  Patient with CT Head in ED without acute ischemia or hemorrage.  F/U CTA head.  Patients lab work showing VAMSI, anemia.  EKG Showing 1st degree AV block. .  Patient is being admitted to tele for mechical fall with 1st degree av block Patient is a 89 year old male from home, ambulates independently, with PMH of HTN, HLD, and BPH who presented to the ED after falling at home.  Patient with CT Head in ED without acute ischemia or hemorrage.  F/U CTA head.  Patients lab work showing VAMSI, anemia.  EKG Showing 1st degree AV block. .  Patient is being admitted to tele for mechical fall, VAMSI, 1st degree av block, and new onset paroxysmal afib.

## 2023-11-18 NOTE — PHYSICAL THERAPY INITIAL EVALUATION ADULT - RANGE OF MOTION EXAMINATION, REHAB EVAL
except for both sh fl to ~ 100 deg/bilateral upper extremity ROM was WFL (within functional limits)/bilateral lower extremity ROM was WFL (within functional limits)

## 2023-11-18 NOTE — PROGRESS NOTE ADULT - SUBJECTIVE AND OBJECTIVE BOX
PGY-1 Progress Note discussed with attending    PAGER #: [[phone]] TILL 5:00 PM  PLEASE CONTACT ON CALL TEAM:  - On Call Team (Please refer to Rosa) FROM 5:00 PM - 8:30PM  - Nightfloat Team FROM 8:30 -7:30 AM    CC: Patient is a 89y old  Male who presents with a chief complaint of Mechanical fall (17 Nov 2023 13:39)      OVERNIGHT EVENTS:    SUBJECTIVE / INTERVAL HPI: Patient seen and examined at bedside.     ROS:  CONSTITUTIONAL: No weakness, fevers or chills  EYES/ENT: No visual changes;  No vertigo or throat pain   NECK: No pain or stiffness  RESPIRATORY: No cough, wheezing, hemoptysis; No shortness of breath  CARDIOVASCULAR: No chest pain or palpitations  GASTROINTESTINAL: No abdominal or epigastric pain. No nausea, vomiting, or hematemesis; No diarrhea or constipation. No melena or hematochezia.  GENITOURINARY: No dysuria, frequency or hematuria  NEUROLOGICAL: No numbness or weakness  SKIN: No itching, burning, rashes, or lesions     VITAL SIGNS:  Vital Signs Last 24 Hrs  T(C): 36.9 (18 Nov 2023 07:19), Max: 37.5 (18 Nov 2023 04:57)  T(F): 98.4 (18 Nov 2023 07:19), Max: 99.5 (18 Nov 2023 04:57)  HR: 111 (18 Nov 2023 07:19) (78 - 111)  BP: 116/78 (18 Nov 2023 07:19) (115/65 - 151/57)  BP(mean): --  RR: 19 (18 Nov 2023 07:19) (17 - 19)  SpO2: 94% (18 Nov 2023 07:19) (93% - 98%)    Parameters below as of 18 Nov 2023 07:19  Patient On (Oxygen Delivery Method): room air        PHYSICAL EXAM:    General: WDWN  HEENT: NC/AT; PERRL, anicteric sclera; MMM  Neck: supple  Cardiovascular: +S1/S2; RRR  Respiratory: CTA B/L; no W/R/R  Gastrointestinal: soft, NT/ND; +BSx4  Extremities: WWP; no edema, clubbing or cyanosis  Vascular: 2+ radial, DP/PT pulses B/L  Skin: Warm, dry, good turgor, no rashes, or ecchymoses  Neurological: AAOx3; no focal deficits    MEDICATIONS:  MEDICATIONS  (STANDING):  atorvastatin 40 milliGRAM(s) Oral at bedtime  heparin   Injectable 5000 Unit(s) SubCutaneous every 8 hours  influenza  Vaccine (HIGH DOSE) 0.7 milliLiter(s) IntraMuscular once  lactated ringers. 1000 milliLiter(s) (70 mL/Hr) IV Continuous <Continuous>  tamsulosin 0.4 milliGRAM(s) Oral at bedtime    MEDICATIONS  (PRN):  acetaminophen     Tablet .. 650 milliGRAM(s) Oral every 6 hours PRN Temp greater or equal to 38C (100.4F), Mild Pain (1 - 3)      ALLERGIES:  Allergies    penicillin (Hives)    Intolerances        LABS:                        9.7    11.94 )-----------( 166      ( 17 Nov 2023 13:12 )             30.3     11-18    140  |  109<H>  |  29<H>  ----------------------------<  97  4.4   |  25  |  1.70<H>    Ca    8.7      18 Nov 2023 06:39  Phos  2.7     11-18  Mg     1.3     11-18    TPro  7.1  /  Alb  3.3<L>  /  TBili  1.0  /  DBili  x   /  AST  15  /  ALT  24  /  AlkPhos  66  11-17      Urinalysis Basic - ( 18 Nov 2023 06:39 )    Color: x / Appearance: x / SG: x / pH: x  Gluc: 97 mg/dL / Ketone: x  / Bili: x / Urobili: x   Blood: x / Protein: x / Nitrite: x   Leuk Esterase: x / RBC: x / WBC x   Sq Epi: x / Non Sq Epi: x / Bacteria: x      CAPILLARY BLOOD GLUCOSE          RADIOLOGY & ADDITIONAL TESTS: Reviewed. PGY-1 Progress Note discussed with attending    PAGER #: [[phone]] TILL 5:00 PM  PLEASE CONTACT ON CALL TEAM:  - On Call Team (Please refer to Rosa) FROM 5:00 PM - 8:30PM  - Nightfloat Team FROM 8:30 -7:30 AM    CC: Patient is a 89y old  Male who presents with a chief complaint of Mechanical fall (17 Nov 2023 13:39)      OVERNIGHT EVENTS: admit to 5N, episodes of Afib noted on telemetry     SUBJECTIVE / INTERVAL HPI: Patient seen and examined at bedside. Complaining of L knee and ankle pain s/p fall exacerbated by movement and weight bearing. Reports 10-15lb weight loss over the last year. Denies night sweats or increased fatigue. Endorses urinary frequency but denies dysuria, hesitancy, or urgency. Denies fever, chills, vision changes, chest pain, palpitations, shortness of breath, nausea, vomiting, diarrhea, and constipation.      ROS:  CONSTITUTIONAL: No weakness, fevers or chills  EYES/ENT: No visual changes;  No vertigo or throat pain   NECK: No pain or stiffness  RESPIRATORY: No cough, wheezing, hemoptysis; No shortness of breath  CARDIOVASCULAR: No chest pain or palpitations  GASTROINTESTINAL: No abdominal or epigastric pain. No nausea, vomiting, or hematemesis; No diarrhea or constipation. No melena or hematochezia.  GENITOURINARY: No dysuria, frequency or hematuria  NEUROLOGICAL: No numbness or weakness  SKIN: No itching, burning, rashes, or lesions   MSK: +L knee and ankle pain and swelling    VITAL SIGNS:  Vital Signs Last 24 Hrs  T(C): 36.9 (18 Nov 2023 07:19), Max: 37.5 (18 Nov 2023 04:57)  T(F): 98.4 (18 Nov 2023 07:19), Max: 99.5 (18 Nov 2023 04:57)  HR: 111 (18 Nov 2023 07:19) (78 - 111)  BP: 116/78 (18 Nov 2023 07:19) (115/65 - 151/57)  BP(mean): --  RR: 19 (18 Nov 2023 07:19) (17 - 19)  SpO2: 94% (18 Nov 2023 07:19) (93% - 98%)    Parameters below as of 18 Nov 2023 07:19  Patient On (Oxygen Delivery Method): room air        PHYSICAL EXAM:    General: elderly, younger appearing than stated age, NAD  HEENT: NC/AT; PERRL, anicteric sclera; MMM  Neck: supple  Cardiovascular: +S1/S2; RRR, systolic murmur  Respiratory: CTA B/L; no W/R/R  Gastrointestinal: soft, NT/ND, suprapubic firmness; +BSx4  Extremities: WWP; mild non-pitting edema medial L knee and L ankle, bony protrusion on medial L forearm, no clubbing or cyanosis  Vascular: 2+ radial, DP/PT pulses B/L  Skin: Warm, dry, good turgor, no rashes, or ecchymoses  Neurological: AAOx3; no focal deficits    MEDICATIONS:  MEDICATIONS  (STANDING):  atorvastatin 40 milliGRAM(s) Oral at bedtime  heparin   Injectable 5000 Unit(s) SubCutaneous every 8 hours  influenza  Vaccine (HIGH DOSE) 0.7 milliLiter(s) IntraMuscular once  lactated ringers. 1000 milliLiter(s) (70 mL/Hr) IV Continuous <Continuous>  tamsulosin 0.4 milliGRAM(s) Oral at bedtime    MEDICATIONS  (PRN):  acetaminophen     Tablet .. 650 milliGRAM(s) Oral every 6 hours PRN Temp greater or equal to 38C (100.4F), Mild Pain (1 - 3)      ALLERGIES:  Allergies    penicillin (Hives)    Intolerances        LABS:                        9.7    11.94 )-----------( 166      ( 17 Nov 2023 13:12 )             30.3     11-18    140  |  109<H>  |  29<H>  ----------------------------<  97  4.4   |  25  |  1.70<H>    Ca    8.7      18 Nov 2023 06:39  Phos  2.7     11-18  Mg     1.3     11-18    TPro  7.1  /  Alb  3.3<L>  /  TBili  1.0  /  DBili  x   /  AST  15  /  ALT  24  /  AlkPhos  66  11-17      Urinalysis Basic - ( 18 Nov 2023 06:39 )    Color: x / Appearance: x / SG: x / pH: x  Gluc: 97 mg/dL / Ketone: x  / Bili: x / Urobili: x   Blood: x / Protein: x / Nitrite: x   Leuk Esterase: x / RBC: x / WBC x   Sq Epi: x / Non Sq Epi: x / Bacteria: x      CAPILLARY BLOOD GLUCOSE          RADIOLOGY & ADDITIONAL TESTS:   < from: CT Angio Neck w/ IV Cont (11.17.23 @ 15:27) >  INTERPRETATION:  Clinical indication:  Evaluate for aneurysm, bulbous   appearance of left ICA on noncontrast CT        After the intravenous power injection of 90 cc of Omnipaque 350 using a   bolus tracie timing run serial thin sections were obtained through the   neck from the thoracic inlet through the intracranial circulation   centered at the [id number] on a multislice CT scanner reformatted   with coronal and sagittal 2 D-MIP projections, including 3 D   reconstructions using a separate 3D [employer] software workstation.A total   of  90  cc ofOmnipaque were intravenously injected.  10 cc were   discarded.        There is calcification along the aortic arch and the origin of the left   subclavian artery. The origins of the carotid and vertebral arteries are   normal. The right vertebral artery is dominant, the left vertebral artery   ends in PICA. The right internal carotid artery takes a retropharyngeal   course. Proximal internal carotid arteries are calcified without   significant stenosis      The distal vertebral arteries are wellidentified as are the   posterior-inferior cerebellar arteries bilaterally. The region of the   vertebral basilar junction is normal. The basilar artery is normal. The   posterior cerebral and superior cerebellar arteries are normal.    Evaluation ofthe carotid arteries demonstrate normal appearance to the   distal cervical, petrous, cavernous and right supraclinoid internal   carotid arteries. The left supraclinoid internal carotid artery is   slightly dilated and dolichoectatic as is the left M1 branch.. There is   no aneurysm. The anterior cerebral arteries, anterior communicating   artery and middle cerebral arteries are normal.      The normal intracranial venous circulation is identified. The superior   sagittal sinus, internal cerebral veins, vein of Bradley, straight sinus,   transverse sinuses, sigmoid sinuses and internal jugular veins are   normal. Cortical veins are normal. There is no evidence of aneurysm,   stenosis, or vessel occlusion.    Incidental note is made of marked calcification of the left stylohyoid   ligament.    IMPRESSION: No carotid or vertebral stenosis in the neck. Retropharyngeal   right internal carotid artery.    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 No   aneurysm.    < end of copied text >  < from: Xray Ankle Complete 3 Views, Left (11.17.23 @ 12:13) >  ACC: [id number] EXAM:  XR FOOT COMP MIN 3 VIEWS LT   ORDERED BY: FERNANDEZ FORD     ACC: [id number] EXAM:  XR TIB FIB AP LAT 2 VIEWS LT   ORDERED BY: FERNANDEZ FORD     ACC: [id number] EXAM:  XR KNEE AP LAT OBL 3 VIEWS LT   ORDERED BY:   FERNANDEZ FORD     ACC: [id number] EXAM:  XR ANKLE COMP MIN 3 VIEWS LT   ORDERED BY: FERNANDEZ FORD     PROCEDURE DATE:  11/17/2023          INTERPRETATION:  Left knee, tib-fib, ankle, and foot. Patient had a fall.    Left knee. 3 views.    No knee effusion. Somearterial calcification seen.    There is moderate degeneration most pronounced in the medial compartment   with loss of joint space. No fracture.    Left tib-fib. 2 views. 4 images.    Bones are intact.    Left ankle. 3 views.    There is swelling off the lateral malleolus and slight degeneration of   the malleolar tips.    There is an inferior calcaneal spur. Arterial calcification noted. No   fracture.    Left foot. 3 views.    There is mild degeneration at the first MTP joint. No bone destruction or   fracture.    IMPRESSION: No acute fracture. Swelling of the left lateral malleolus.   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    --- End of Report ---    < end of copied text >

## 2023-11-18 NOTE — PROGRESS NOTE ADULT - PROBLEM SELECTOR PLAN 2
- SCr of 2 on admission  - Unclear baseline  - denies history of kidney injury  - IV Fluids  - F/U Renal US  - F/U BMP daily   - F/U SPEP/UPEP, kappa/lambda ratio, UA with urine protein/Cr ratio  - Heme/Onc consulted [facility] for concern for myeloma - presented after episode of falling at home  - hemodynmically stable and afebrile  - EKG showing 1st degree av block  - c/w telemetry--> new onset Afib  - F/u orthostatic vitals  - CT Head without ischemia or hemorrage, CTA head no stenosis, dolichoectasia of L supraclinoid ICA  - PT consulted  - Pain control with Acetaminophen

## 2023-11-18 NOTE — PROGRESS NOTE ADULT - PROBLEM SELECTOR PLAN 1
- presented after episode of falling at home  - hemdynmically stable and afebrile  - EKG showing 1st degree  - Admit to telemetry  - F/u orthostatic vitals  - CT Head without ischemia or hemorrage, f/u CTA head  - PT consulted  - Pain control with Acetaminophen no hx of afib presenting on metoprolol at home s/p mechanical fall now with new paroxsymal Afib on telemetry  -CHADSVASC=3  -f/u A1c  -Dr. Valencia consulted  -give digoxin 0.5mg x1  -start lopressor 12.5mg BID  -c/w telemetry

## 2023-11-18 NOTE — ED ADULT NURSE REASSESSMENT NOTE - NS ED NURSE REASSESS COMMENT FT1
pt transferred to the floor on tele monitor, w/ a RN and transporter, pt aware of POC, no other concerns are noted.

## 2023-11-18 NOTE — PATIENT PROFILE ADULT - FALL HARM RISK - HARM RISK INTERVENTIONS

## 2023-11-18 NOTE — PROGRESS NOTE ADULT - ATTENDING COMMENTS
Patient is a 89 yo M from home, lives alone, PMHx of HTN, HLD, BPH, presenting after an episode of fall. Patient states he was running to catch a mouse at home when he tripped at fell over, denies syncope. Admitted for fall and further w/u     Pt was seen and examined, he c/o having left knee pain since fall but had good ROM and was able to bend it with no difficulty. He has had falls in past but not recurrent. On tele, found to have Afib w/ RVR     Labs reviewed- cbc, bmp, Mg, PO4     PE as above     A/P:  #New onset Afib w/ RVR  #s/p mechanical fall   #Macrocytic Anemia   #VAMSI- unknown baseline   #Hypomagnesemia   #HTN  #HLD  #BPH  #DVT ppx     Plan:  -Pt w/ mechanical fall, no syncope reported. Tele w/ Afib- patient denies any prior hx.   -CAHDSVASC- 3 points, will hold off AC given high risk of falls. Start asa and metoprolol per cardiology recs. c/w tele. Follow ECHO.  Dr. Valencia consulted.   -pt w/ anemia, follow folate, vitamin B12, MM w/u in progress. Appreciate heme/onc recs   -X-ray reviewed w/ no fx, pain is well controlled. PT eval-MINDY but patient claims he wants to go home.   -Scr improved, will give trial of IV fluids- check post-void bladder scan. C/w tamsulosin   -C/w heparin SC Patient is a 89 yo M from home, lives alone, PMHx of HTN, HLD, BPH, presenting after an episode of fall. Patient states he was running to catch a mouse at home when he tripped at fell over, denies syncope. Admitted for fall and further w/u     Pt was seen and examined, he c/o having left knee pain since fall but had good ROM and was able to bend it with no difficulty. He has had falls in past but not recurrent. On tele, found to have Afib w/ RVR     Labs reviewed- cbc, bmp, Mg, PO4     PE as above     A/P:  #New onset Afib w/ RVR  #s/p mechanical fall   #Macrocytic Anemia   #VAMSI- unknown baseline   #Hypomagnesemia   #HTN  #HLD  #BPH  #DVT ppx     Plan:  -Pt w/ mechanical fall, no syncope reported. Tele w/ Afib- patient denies any prior hx.   -CAHDSVASC- 3 points, will hold off AC given high risk of falls. Start asa and metoprolol per cardiology recs. c/w tele. Follow ECHO.  Dr. Valencia consulted, discussed care plan with her.   -pt w/ anemia, follow folate, vitamin B12, MM w/u in progress. Appreciate heme/onc recs   -X-ray reviewed w/ no fx, pain is well controlled. PT eval-MINDY but patient claims he wants to go home.   -Scr improved, will give trial of IV fluids- check post-void bladder scan. C/w tamsulosin   -C/w heparin SC

## 2023-11-18 NOTE — CONSULT NOTE ADULT - ASSESSMENT
Anemia, mild renal insufficiency  Increased MCV    Agree with W/U of MM    SPEP, UPEP with immunofixation  Quantitative Immunoglobulins  Do also anemia W/U- Iron studies, B12, Folic acid levels  Will F/U
 89 year old male from home, ambulates independently, with PMH of HTN, HLD, and BPH who presented to the ED after falling at home,VAMSI on CRI?,anemia and now new onset afib.  1.Tele monitoring.  2.Afib-fall/bleed risk-no AC,add asa,lopressor 12.5mg q8,dig .5mg ivx1.  3.VAMSI on CRI-IVF(s/p IV contrast)..  4.Check TFT's and echocardiogram.  5.Anemia work-up as per heme.  6.HTN-b blocker.  7.Lipid d/o-statin.  8.GI and DVT prophylaxis.

## 2023-11-18 NOTE — PHYSICAL THERAPY INITIAL EVALUATION ADULT - GENERAL OBSERVATIONS, REHAB EVAL
Pt seen supine in bed w/telemetry, c/o 5/10 pain of left thigh/knee, was cooperative during assessment

## 2023-11-19 NOTE — CHART NOTE - NSCHARTNOTEFT_GEN_A_CORE
Notified by RN that /81 mmHg    NOTE- earlier today BP was running soft, s/p 1 L IVF, home BP meds held.     Patient examined bedside-  Patient is AO 2-3 ( at his baseline)  Asymptomatic, denies any pain, dizziness, headache, SOB.     Examination- chest-cardiac- S1, S2 +                             -Respiratory- BL BS +    Plan- will repeat BP now and f/u     - Repeat BP 11:57 pm 108/64      Will continue current management/monitoring.

## 2023-11-19 NOTE — PROGRESS NOTE ADULT - PROBLEM SELECTOR PLAN 5
- Patient with history of HTN  - Patient on Lopressor 100mg BID, Losartan 100mg at home  - Hold antihypertensives for now for soft BPs

## 2023-11-19 NOTE — PROGRESS NOTE ADULT - PROBLEM SELECTOR PLAN 4
- Hb of 9.7  - MCV of 102  - F/U iron studies, Vitamin b12 and folate  - heme/onc consulted - Hb of 9.7  - MCV of 102  - Iron 9, Fe % 4, TIBC 214  , Vitamin b12 and folate  - heme/onc consulted  - 11/19 ferrous sulfate 325mg, vit C, miralax started

## 2023-11-19 NOTE — PROGRESS NOTE ADULT - PROBLEM SELECTOR PLAN 1
no hx of afib presenting on metoprolol at home s/p mechanical fall now with new paroxsymal Afib on telemetry  -CHADSVASC=3  -f/u A1c  -Dr. Valencia consulted  -give digoxin 0.5mg x1  -start lopressor 12.5mg BID  -c/w telemetry no hx of afib presenting on metoprolol at home s/p mechanical fall now with new paroxsymal Afib on telemetry  -CHADSVASC=3  -f/u A1c  -Dr. Valencia consulted  -give digoxin 0.5mg x1  -start lopressor 12.5mg BID  -c/w telemetry  - holding AC in setting of fall at home

## 2023-11-19 NOTE — PROGRESS NOTE ADULT - ASSESSMENT
89 year old male from home, ambulates independently, with PMH of HTN, HLD, and BPH who presented to the ED after falling at home,VAMSI on CRI?,anemia and now new onset afib,FE def anemia.  1.Tele monitoring.  2.Afib-fall/bleed risk-no AC asa,lopressor 12.5mg q8,dig .125mg qod.  3.VAMSI on CRI-IVF(s/p IV contrast).Urinary retention-straight cath-check ua and cx.  4.Echocardiogram.  5.Fe def anemia-IV Iron.  6.HTN-b blocker.  7.Lipid d/o-statin.  8.GI and DVT prophylaxis.

## 2023-11-19 NOTE — PROGRESS NOTE ADULT - SUBJECTIVE AND OBJECTIVE BOX
Chief complaint: Patient is a 89y old  Male who presents with a chief complaint of Mechanical fall (18 Nov 2023 12:13)      RUTH SCHAEFFER is a 89y year old Male     INTERVAL HPI/OVERNIGHT EVENTS:      REVIEW OF SYSTEMS:  CONSTITUTIONAL: No fever, weight loss, or fatigue  EYES: No eye pain, visual disturbances, or discharge  ENMT:  No difficulty hearing, tinnitus, vertigo; No sinus or throat pain  NECK: No pain or stiffness  RESPIRATORY: No cough, wheezing, chills or hemoptysis; No shortness of breath  CARDIOVASCULAR: No chest pain, palpitations, dizziness, or leg swelling  GASTROINTESTINAL: No abdominal or epigastric pain. No nausea, vomiting, or hematemesis; No diarrhea or constipation. No melena or hematochezia.  GENITOURINARY: No dysuria, frequency, hematuria, or incontinence  NEUROLOGICAL: No headaches, memory loss, loss of strength, numbness, or tremors  SKIN: No itching, burning, rashes, or lesions   ENDOCRINE: No heat or cold intolerance  MUSCULOSKELETAL: No joint pain or swelling; No muscle, back, or extremity pain  PSYCHIATRIC: No depression, anxiety, mood swings, or difficulty sleeping  HEME/LYMPH: No easy bruising, or bleeding gums  ALLERY AND IMMUNOLOGIC: No hives or eczema    FAMILY HISTORY:      T(C): 36.6 (11-19-23 @ 04:26), Max: 37.8 (11-18-23 @ 15:45)  HR: 96 (11-19-23 @ 04:26) (90 - 104)  BP: 116/53 (11-19-23 @ 04:26) (102/82 - 116/53)  RR: 18 (11-19-23 @ 04:26) (18 - 19)  SpO2: 98% (11-19-23 @ 04:26) (94% - 98%)  Wt(kg): --Vital Signs Last 24 Hrs  T(C): 36.6 (19 Nov 2023 04:26), Max: 37.8 (18 Nov 2023 15:45)  T(F): 97.8 (19 Nov 2023 04:26), Max: 100 (18 Nov 2023 15:45)  HR: 96 (19 Nov 2023 04:26) (90 - 104)  BP: 116/53 (19 Nov 2023 04:26) (102/82 - 116/53)  BP(mean): --  RR: 18 (19 Nov 2023 04:26) (18 - 19)  SpO2: 98% (19 Nov 2023 04:26) (94% - 98%)    Parameters below as of 19 Nov 2023 04:26  Patient On (Oxygen Delivery Method): room air        PHYSICAL EXAM:  GENERAL: NAD, well-groomed, well-developed  HEAD:  Atraumatic, Normocephalic  EYES: EOMI, PERRLA, conjunctiva and sclera clear  ENMT: No tonsillar erythema, exudates, or enlargement; Moist mucous membranes.   NECK: Supple, No JVD  NERVOUS SYSTEM:  Alert & Oriented X3, Good concentration; Motor Strength 5/5 B/L upper and lower extremities  CHEST/LUNG: Clear to percussion bilaterally; No rales, rhonchi, wheezing, or rubs  HEART: Regular rate and rhythm; No murmurs, rubs, or gallops  ABDOMEN: Soft, Nontender, Nondistended; Bowel sounds present  EXTREMITIES: No clubbing, cyanosis, or edema  SKIN: No rashes or lesions    Consultant(s) Notes Reviewed:  [x ] YES  [ ] NO  Care Discussed with Consultants/Other Providers [ x] YES  [ ] NO    LABS:                        8.2    7.75  )-----------( 117      ( 19 Nov 2023 06:29 )             25.0       11-18    140  |  109<H>  |  29<H>  ----------------------------<  97  4.4   |  25  |  1.70<H>    Ca    8.7      18 Nov 2023 06:39  Phos  2.7     11-18  Mg     1.3     11-18    TPro  7.1  /  Alb  3.3<L>  /  TBili  1.0  /  DBili  x   /  AST  15  /  ALT  24  /  AlkPhos  66  11-17        RADIOLOGY & ADDITIONAL TESTS:    Imaging Personally Reviewed:  [ ] YES  [ ] NO  acetaminophen     Tablet .. 650 milliGRAM(s) Oral every 6 hours PRN  aspirin  chewable 81 milliGRAM(s) Oral daily  atorvastatin 40 milliGRAM(s) Oral at bedtime  heparin   Injectable 5000 Unit(s) SubCutaneous every 8 hours  influenza  Vaccine (HIGH DOSE) 0.7 milliLiter(s) IntraMuscular once  lactated ringers. 1000 milliLiter(s) IV Continuous <Continuous>  metoprolol tartrate 12.5 milliGRAM(s) Oral every 8 hours  tamsulosin 0.4 milliGRAM(s) Oral at bedtime      HEALTH ISSUES - PROBLEM Dx:  Macrocytic anemia    History of BPH    Hypertension    Hyperlipidemia    Fall at home    VAMSI (acute kidney injury)    Prophylactic measure    Atrial fibrillation, new onset           Chief complaint: Patient is a 89y old  Male who presents with a chief complaint of Mechanical fall (18 Nov 2023 12:13)      RUTH SCHAEFFER is a 89y year old Male     INTERVAL HPI/OVERNIGHT EVENTS: No acute events overnight.  Patient examined at bedside this AM.  Patient denies acute complaints      REVIEW OF SYSTEMS:  CONSTITUTIONAL: No fever, weight loss, or fatigue  EYES: No eye pain, visual disturbances, or discharge  ENMT:  No difficulty hearing, tinnitus, vertigo; No sinus or throat pain  NECK: No pain or stiffness  RESPIRATORY: No cough, wheezing, chills or hemoptysis; No shortness of breath  CARDIOVASCULAR: No chest pain, palpitations, dizziness, or leg swelling  GASTROINTESTINAL: No abdominal or epigastric pain. No nausea, vomiting, or hematemesis; No diarrhea or constipation. No melena or hematochezia.  GENITOURINARY: No dysuria, frequency, hematuria, or incontinence  NEUROLOGICAL: No headaches, memory loss, loss of strength, numbness, or tremors  SKIN: No itching, burning, rashes, or lesions   MUSCULOSKELETAL: Left knee pain and swelling; No muscle, back, or extremity pain      FAMILY HISTORY:      T(C): 36.6 (11-19-23 @ 04:26), Max: 37.8 (11-18-23 @ 15:45)  HR: 96 (11-19-23 @ 04:26) (90 - 104)  BP: 116/53 (11-19-23 @ 04:26) (102/82 - 116/53)  RR: 18 (11-19-23 @ 04:26) (18 - 19)  SpO2: 98% (11-19-23 @ 04:26) (94% - 98%)  Wt(kg): --Vital Signs Last 24 Hrs  T(C): 36.6 (19 Nov 2023 04:26), Max: 37.8 (18 Nov 2023 15:45)  T(F): 97.8 (19 Nov 2023 04:26), Max: 100 (18 Nov 2023 15:45)  HR: 96 (19 Nov 2023 04:26) (90 - 104)  BP: 116/53 (19 Nov 2023 04:26) (102/82 - 116/53)  BP(mean): --  RR: 18 (19 Nov 2023 04:26) (18 - 19)  SpO2: 98% (19 Nov 2023 04:26) (94% - 98%)    Parameters below as of 19 Nov 2023 04:26  Patient On (Oxygen Delivery Method): room air        PHYSICAL EXAM:  GENERAL: NAD, well-groomed, well-developed  HEAD:  Atraumatic, Normocephalic  EYES: EOMI, PERRLA, conjunctiva and sclera clear  ENMT: No tonsillar erythema, exudates, or enlargement; Moist mucous membranes.   NECK: Supple, No JVD  NERVOUS SYSTEM:  Alert & Oriented X1-2; Motor Strength 5/5 B/L upper and lower extremities  CHEST/LUNG: Clear to percussion bilaterally; No rales, rhonchi, wheezing, or rubs  HEART: Irregular rate and rhythm; No murmurs, rubs, or gallops  ABDOMEN: Soft, Nontender, Nondistended; Bowel sounds present  EXTREMITIES: Left knee swelling (1+ edema) ROM intact; No clubbing, cyanosis  SKIN: No rashes or lesions    Consultant(s) Notes Reviewed:  [x ] YES  [ ] NO  Care Discussed with Consultants/Other Providers [ x] YES  [ ] NO    LABS:                        8.2    7.75  )-----------( 117      ( 19 Nov 2023 06:29 )             25.0       11-18    140  |  109<H>  |  29<H>  ----------------------------<  97  4.4   |  25  |  1.70<H>    Ca    8.7      18 Nov 2023 06:39  Phos  2.7     11-18  Mg     1.3     11-18    TPro  7.1  /  Alb  3.3<L>  /  TBili  1.0  /  DBili  x   /  AST  15  /  ALT  24  /  AlkPhos  66  11-17        RADIOLOGY & ADDITIONAL TESTS:    Imaging Personally Reviewed:  [ ] YES  [ ] NO  acetaminophen     Tablet .. 650 milliGRAM(s) Oral every 6 hours PRN  aspirin  chewable 81 milliGRAM(s) Oral daily  atorvastatin 40 milliGRAM(s) Oral at bedtime  heparin   Injectable 5000 Unit(s) SubCutaneous every 8 hours  influenza  Vaccine (HIGH DOSE) 0.7 milliLiter(s) IntraMuscular once  lactated ringers. 1000 milliLiter(s) IV Continuous <Continuous>  metoprolol tartrate 12.5 milliGRAM(s) Oral every 8 hours  tamsulosin 0.4 milliGRAM(s) Oral at bedtime      HEALTH ISSUES - PROBLEM Dx:  Macrocytic anemia    History of BPH    Hypertension    Hyperlipidemia    Fall at home    VAMSI (acute kidney injury)    Prophylactic measure    Atrial fibrillation, new onset

## 2023-11-19 NOTE — PROGRESS NOTE ADULT - PROBLEM SELECTOR PLAN 3
hx of BPH on flomax  - SCr of 2 on admission  - Unclear baseline  - denies history of kidney injury  - c/w IV Fluids  - Mg2+ 1.3-->replace with 2g IV  - Trend sCr 2.0-->1.7  - F/U post-void bladder scan and Renal US  - F/U BMP daily   - F/U SPEP/UPEP, kappa/lambda ratio, UA with urine lytes  - Heme/Onc consulted [facility] for concern for myeloma hx of BPH on flomax  - SCr of 2 on admission  - Unclear baseline  - denies history of kidney injury  - c/w IV Fluids  - Mg2+ 1.3-->replace with 2g IV  - Trend sCr 2.0-->1.7 -> 1.75  - F/U post-void bladder scan and Renal US  - F/U BMP daily   - F/U SPEP/UPEP, kappa/lambda ratio, UA with urine lytes  - Heme/Onc consulted [facility] for concern for myeloma  - pre-renal component; bladder scan 11/19 showing 525 cc x1 straight cath

## 2023-11-19 NOTE — PROGRESS NOTE ADULT - ASSESSMENT
Patient is a 89 year old male from home, ambulates independently, with PMH of HTN, HLD, and BPH who presented to the ED after falling at home.  Patient with CT Head in ED without acute ischemia or hemorrage.  F/U CTA head.  Patients lab work showing VAMSI, anemia.  EKG Showing 1st degree AV block. .  Patient is being admitted to tele for mechical fall, VAMSI, 1st degree av block, and new onset paroxysmal afib.

## 2023-11-19 NOTE — CHART NOTE - NSCHARTNOTEFT_GEN_A_CORE
On-call resident notified of low blood pressure (60/34). Pt examined at bedside. In no distress and no signs of bleeding. Pt lungs where clear and other vital were wnl. Ordered 1L bolus NS. SBP improved to >90 while still at bedside. Will reassess BP after fluid is administered. On-call resident notified of low blood pressure (60/34), HR 82. Pt examined at bedside. In no distress and no signs of bleeding. Pt lungs where clear and other vital were wnl. Ordered 1L bolus NS. SBP improved to >90 while still at bedside, HR 80s. Will reassess BP after fluid is administered. Discussed with PGY2 Dr Love. PGY1 Dr. Vila notified of low blood pressure (60/34), HR 82. Pt examined at bedside. In no distress and no signs of bleeding. Pt lungs where clear and other vital were wnl. Ordered 1L bolus NS. SBP improved to >90 while still at bedside, HR 80s. Will reassess BP after fluid is administered. Continuing metoprolol 12.5 mg TID at this time for paroxysmal Afib, continuing to hold home losartan 100 mg and home dose of metoprolol which is 100 mg. Discussed with PGY2 Dr Love.

## 2023-11-19 NOTE — PROGRESS NOTE ADULT - PROBLEM SELECTOR PLAN 2
- presented after episode of falling at home  - hemodynmically stable and afebrile  - EKG showing 1st degree av block  - c/w telemetry--> new onset Afib  - F/u orthostatic vitals  - CT Head without ischemia or hemorrage, CTA head no stenosis, dolichoectasia of L supraclinoid ICA  - PT consulted  - Pain control with Acetaminophen - presented after episode of falling at home  - hemodynmically stable and afebrile  - EKG showing 1st degree av block  - c/w telemetry--> new onset Afib  - F/u orthostatic vitals  - CT Head without ischemia or hemorrage, CTA head no stenosis, dolichoectasia of L supraclinoid ICA  - PT consulted > rec MINDY   - Pain control with Acetaminophen

## 2023-11-19 NOTE — PROGRESS NOTE ADULT - SUBJECTIVE AND OBJECTIVE BOX
Date of Service 11-19-23 @ 11:48    CHIEF COMPLAINT:Patient is a 89y old  Male who presents with a chief complaint of Mechanical fall .Pt appears comfortable.    	  REVIEW OF SYSTEMS:  CONSTITUTIONAL: No fever, weight loss, or fatigue  EYES: No eye pain, visual disturbances, or discharge  ENT:  No difficulty hearing, tinnitus, vertigo; No sinus or throat pain  NECK: No pain or stiffness  RESPIRATORY: No cough, wheezing, chills or hemoptysis; No Shortness of Breath  CARDIOVASCULAR: No chest pain, palpitations, passing out, dizziness, or leg swelling  GASTROINTESTINAL: No abdominal or epigastric pain. No nausea, vomiting, or hematemesis; No diarrhea or constipation. No melena or hematochezia.  GENITOURINARY: No dysuria, frequency, hematuria, or incontinence  NEUROLOGICAL: No headaches, memory loss, loss of strength, numbness, or tremors  SKIN: No itching, burning, rashes, or lesions   LYMPH Nodes: No enlarged glands  ENDOCRINE: No heat or cold intolerance; No hair loss  MUSCULOSKELETAL: No joint pain or swelling; No muscle, back, or extremity pain  PSYCHIATRIC: No depression, anxiety, mood swings, or difficulty sleeping  HEME/LYMPH: No easy bruising, or bleeding gums  ALLERGY AND IMMUNOLOGIC: No hives or eczema	      PHYSICAL EXAM:  T(C): 37 (11-19-23 @ 11:03), Max: 37.8 (11-18-23 @ 15:45)  HR: 91 (11-19-23 @ 11:03) (90 - 99)  BP: 146/88 (11-19-23 @ 11:03) (102/82 - 146/88)  RR: 18 (11-19-23 @ 11:03) (17 - 18)  SpO2: 95% (11-19-23 @ 11:03) (94% - 98%)  Wt(kg): --  I&O's Summary    18 Nov 2023 07:01  -  19 Nov 2023 07:00  --------------------------------------------------------  IN: 0 mL / OUT: 300 mL / NET: -300 mL        Appearance: Normal	  HEENT:   Normal oral mucosa, PERRL, EOMI	  Lymphatic: No lymphadenopathy  Cardiovascular: Normal S1 S2, No JVD, No murmurs, No edema  Respiratory: Lungs clear to auscultation	  Psychiatry: A & O x 3, Mood & affect appropriate  Gastrointestinal:  Soft, Non-tender, + BS	  Skin: No rashes, No ecchymoses, No cyanosis	  Neurologic: Non-focal  Extremities: Normal range of motion, No clubbing, cyanosis or edema  Vascular: Peripheral pulses palpable 2+ bilaterally    MEDICATIONS  (STANDING):  aspirin  chewable 81 milliGRAM(s) Oral daily  atorvastatin 40 milliGRAM(s) Oral at bedtime  heparin   Injectable 5000 Unit(s) SubCutaneous every 8 hours  influenza  Vaccine (HIGH DOSE) 0.7 milliLiter(s) IntraMuscular once  iron sucrose IVPB 200 milliGRAM(s) IV Intermittent every 24 hours  lactated ringers. 1000 milliLiter(s) (70 mL/Hr) IV Continuous <Continuous>  metoprolol tartrate 12.5 milliGRAM(s) Oral every 8 hours  tamsulosin 0.4 milliGRAM(s) Oral at bedtime      TELEMETRY: 	PAF,NSR with first degree av block ,PVC's  	  	  LABS:	 	                        8.2    7.75  )-----------( 117      ( 19 Nov 2023 06:29 )             25.0     11-19    138  |  108  |  36<H>  ----------------------------<  101<H>  4.2   |  24  |  1.75<H>    Ca    7.8<L>      19 Nov 2023 06:29  Phos  3.4     11-19  Mg     2.0     11-19    TPro  7.1  /  Alb  3.3<L>  /  TBili  1.0  /  DBili  x   /  AST  15  /  ALT  24  /  AlkPhos  66  11-17      TSH: Thyroid Stimulating Hormone, Serum: 0.62 uU/mL (11-19 @ 06:29)

## 2023-11-20 NOTE — PROGRESS NOTE ADULT - PROBLEM SELECTOR PLAN 4
- Hb of 9.7  - MCV of 102  - Iron 9, Fe % 4, TIBC 214  , Vitamin b12 and folate  - heme/onc consulted  - 11/19 ferrous sulfate 325mg, vit C, miralax started Pt p/w Hb of 9.7; MCV of 102  - Vitamin b12 and folate wnl  - c/w PO  ferrous sulfate 325mg, vit C 500mg  - heme/onc consulted

## 2023-11-20 NOTE — PROGRESS NOTE ADULT - SUBJECTIVE AND OBJECTIVE BOX
Date of Service 11-20-23 @ 10:52    CHIEF COMPLAINT:Patient is a 89y old  Male who presents with a chief complaint of Mechanical fall.Pt appears comfortable.    	  REVIEW OF SYSTEMS:  CONSTITUTIONAL: No fever, weight loss, or fatigue  EYES: No eye pain, visual disturbances, or discharge  ENT:  No difficulty hearing, tinnitus, vertigo; No sinus or throat pain  NECK: No pain or stiffness  RESPIRATORY: No cough, wheezing, chills or hemoptysis; No Shortness of Breath  CARDIOVASCULAR: No chest pain, palpitations, passing out, dizziness, or leg swelling  GASTROINTESTINAL: No abdominal or epigastric pain. No nausea, vomiting, or hematemesis; No diarrhea or constipation. No melena or hematochezia.  GENITOURINARY: No dysuria, frequency, hematuria, or incontinence  NEUROLOGICAL: No headaches, memory loss, loss of strength, numbness, or tremors  SKIN: No itching, burning, rashes, or lesions   LYMPH Nodes: No enlarged glands  ENDOCRINE: No heat or cold intolerance; No hair loss  MUSCULOSKELETAL: No joint pain or swelling; No muscle, back, or extremity pain  PSYCHIATRIC: No depression, anxiety, mood swings, or difficulty sleeping  HEME/LYMPH: No easy bruising, or bleeding gums  ALLERGY AND IMMUNOLOGIC: No hives or eczema	        PHYSICAL EXAM:  T(C): 36.7 (11-20-23 @ 07:40), Max: 37.2 (11-20-23 @ 05:14)  HR: 84 (11-20-23 @ 07:40) (82 - 94)  BP: 123/70 (11-20-23 @ 07:40) (67/44 - 197/181)  RR: 18 (11-20-23 @ 07:40) (18 - 18)  SpO2: 94% (11-20-23 @ 07:40) (94% - 97%)  Wt(kg): --  I&O's Summary    19 Nov 2023 07:01  -  20 Nov 2023 07:00  --------------------------------------------------------  IN: 0 mL / OUT: 1050 mL / NET: -1050 mL        Appearance: Normal	  HEENT:   Normal oral mucosa, PERRL, EOMI	  Lymphatic: No lymphadenopathy  Cardiovascular: Normal S1 S2, No JVD, No murmurs, No edema  Respiratory: Lungs clear to auscultation	  Psychiatry: A & O x 3, Mood & affect appropriate  Gastrointestinal:  Soft, Non-tender, + BS	  Skin: No rashes, No ecchymoses, No cyanosis	  Neurologic: Non-focal  Extremities: Normal range of motion, No clubbing, cyanosis or edema  Vascular: Peripheral pulses palpable 2+ bilaterally    MEDICATIONS  (STANDING):  ascorbic acid 500 milliGRAM(s) Oral daily  aspirin  chewable 81 milliGRAM(s) Oral daily  atorvastatin 40 milliGRAM(s) Oral at bedtime  digoxin     Tablet 125 MICROGram(s) Oral every other day  ferrous    sulfate 325 milliGRAM(s) Oral daily  heparin   Injectable 5000 Unit(s) SubCutaneous every 8 hours  influenza  Vaccine (HIGH DOSE) 0.7 milliLiter(s) IntraMuscular once  iron sucrose IVPB 200 milliGRAM(s) IV Intermittent every 24 hours  lactated ringers. 1000 milliLiter(s) (70 mL/Hr) IV Continuous <Continuous>  metoprolol tartrate 12.5 milliGRAM(s) Oral every 8 hours  polyethylene glycol 3350 17 Gram(s) Oral at bedtime  tamsulosin 0.4 milliGRAM(s) Oral at bedtime      TELEMETRY: paf	    	  	  LABS:	 	                        7.9    5.92  )-----------( 120      ( 20 Nov 2023 07:03 )             24.1     11-20    139  |  108  |  36<H>  ----------------------------<  96  4.1   |  26  |  1.75<H>    Ca    7.7<L>      20 Nov 2023 07:03  Phos  3.0     11-20  Mg     1.9     11-20      TSH: Thyroid Stimulating Hormone, Serum: 0.62 uU/mL (11-19 @ 06:29)

## 2023-11-20 NOTE — PROGRESS NOTE ADULT - PROBLEM SELECTOR PLAN 1
no hx of afib presenting on metoprolol at home s/p mechanical fall now with new paroxsymal Afib on telemetry  -CHADSVASC=3  -f/u A1c  -Dr. Valencia consulted  -give digoxin 0.5mg x1  -start lopressor 12.5mg BID  -c/w telemetry  - holding AC in setting of fall at home no hx of afib presenting on metoprolol at home s/p mechanical fall now with new paroxsymal Afib on telemetry  - CHADSVASC=3  - A1c wnl  - Dr. Valencia consulted, pt placed on diagoxin + metoprolol  - c/w digoxin 125mcg every-other-day   - Lopressor transitioned to once a day dosing > Toprol 25mg   - c/w telemetry  - f/u echo  - Full dose ac not being given given pts fall risk  - Cardiology following, Dr. Valencia

## 2023-11-20 NOTE — PROGRESS NOTE ADULT - PROBLEM SELECTOR PLAN 2
- presented after episode of falling at home  - hemodynmically stable and afebrile  - EKG showing 1st degree av block  - c/w telemetry--> new onset Afib  - F/u orthostatic vitals  - CT Head without ischemia or hemorrage, CTA head no stenosis, dolichoectasia of L supraclinoid ICA  - PT consulted > rec MINDY   - Pain control with Acetaminophen Pt presented after episode of falling at home  - hemodynamically stable and afebrile  - CT Head without ischemia or hemorrage, CTA head no stenosis, dolichoectasia of L supraclinoid ICA  - Tele showing 1st degree av block and new onset Afib  - Cardio plan as above  - PT consulted > rec MINDY   - Pain control with Acetaminophen Pt presented after episode of falling at home  - hemodynamically stable and afebrile  - CT Head without ischemia or hemorrage, CTA head no stenosis, dolichoectasia of L supraclinoid ICA  - Tele showing 1st degree av block and new onset Afib  - Cardio plan as above  - PT consulted > rec MINDY   - Pt still complaining of reduced weight bearing on left leg, ordered CT left hip/leg > f/u results  - c/w Pain control with Acetaminophen

## 2023-11-20 NOTE — PROGRESS NOTE ADULT - PROBLEM SELECTOR PLAN 5
- Patient with history of HTN  - Patient on Lopressor 100mg BID, Losartan 100mg at home  - Hold antihypertensives for now for soft BPs Patient with history of HTN on home med Lopressor 100mg BID, Losartan 100mg at home  - Lopressor transitioned to once a day dosing > Toprol 25mg   - Holding off on other meds for now, while adjust as indicated

## 2023-11-20 NOTE — PROGRESS NOTE ADULT - SUBJECTIVE AND OBJECTIVE BOX
PGY-1 Progress Note discussed with attending      PLEASE CONTACT ON CALL TEAM:  - On Call Team (Please refer to Rosa) FROM 5:00 PM - 8:30PM  - Nightfloat Team FROM 8:30 -7:30 AM    INTERVAL HPI/OVERNIGHT EVENTS:   Overnight pt was hypertensive (197/81), BP meds were held, BP has since normalized, most recently 123/70. Of note, pt also had a hypotensive episodes earlier in the day, yesterday, which improved after a 1L bolus. Patient examined at bedside this AM.  Patient denies acute complaints.    REVIEW OF SYSTEMS:  CONSTITUTIONAL: No fever, weight loss, or fatigue  RESPIRATORY: No cough, wheezing,  or hemoptysis; No shortness of breath  CARDIOVASCULAR: No chest pain, palpitations, dizziness, or leg swelling  GASTROINTESTINAL: No abdominal pain. No nausea, vomiting, or hematemesis; No diarrhea or constipation. No melena or hematochezia.  GENITOURINARY: No dysuria, hematuria, or urinary frequency  NEUROLOGICAL: No headaches, memory loss. No focal weakness, numbness, or tremors  MSK: +Left leg feels weak and numb  SKIN: No itching, burning, or rashes    MEDICATIONS  (STANDING):  ascorbic acid 500 milliGRAM(s) Oral daily  aspirin  chewable 81 milliGRAM(s) Oral daily  atorvastatin 40 milliGRAM(s) Oral at bedtime  digoxin     Tablet 125 MICROGram(s) Oral every other day  ferrous    sulfate 325 milliGRAM(s) Oral daily  heparin   Injectable 5000 Unit(s) SubCutaneous every 8 hours  influenza  Vaccine (HIGH DOSE) 0.7 milliLiter(s) IntraMuscular once  iron sucrose IVPB 200 milliGRAM(s) IV Intermittent every 24 hours  lactated ringers. 1000 milliLiter(s) (70 mL/Hr) IV Continuous <Continuous>  metoprolol tartrate 12.5 milliGRAM(s) Oral every 8 hours  polyethylene glycol 3350 17 Gram(s) Oral at bedtime  tamsulosin 0.4 milliGRAM(s) Oral at bedtime    MEDICATIONS  (PRN):  acetaminophen     Tablet .. 650 milliGRAM(s) Oral every 6 hours PRN Temp greater or equal to 38C (100.4F), Mild Pain (1 - 3)      Vital Signs Last 24 Hrs  T(C): 36.7 (20 Nov 2023 07:40), Max: 37.2 (20 Nov 2023 05:14)  T(F): 98.1 (20 Nov 2023 07:40), Max: 99 (20 Nov 2023 05:14)  HR: 84 (20 Nov 2023 07:40) (82 - 94)  BP: 123/70 (20 Nov 2023 07:40) (67/44 - 197/181)  BP(mean): --  RR: 18 (20 Nov 2023 07:40) (18 - 18)  SpO2: 94% (20 Nov 2023 07:40) (94% - 97%)    Parameters below as of 20 Nov 2023 07:40  Patient On (Oxygen Delivery Method): room air        PHYSICAL EXAMINATION:  GENERAL: NAD, lying comfortably in bed  HEAD:  Atraumatic, Normocephalic  EYES:  Conjunctiva and sclera clear  NECK: Supple. No JVD. Normal thyroid  CHEST/LUNG: Clear to auscultation. No rales or wheezing  HEART: Regular rate and rhythm. No abnormal heart sounds  ABDOMEN: Soft, nontender, and nondistended. Bowel sounds present. No pain or masses on palpation  NERVOUS SYSTEM:  Alert & Oriented X3  EXTREMITIES:  2+ Peripheral Pulses. No appreciable edema  SKIN: warm dry                          7.9    5.92  )-----------( 120      ( 20 Nov 2023 07:03 )             24.1     11-20    139  |  108  |  36<H>  ----------------------------<  96  4.1   |  26  |  1.75<H>    Ca    7.7<L>      20 Nov 2023 07:03  Phos  3.0     11-20  Mg     1.9     11-20                I&O's Summary    19 Nov 2023 07:01  -  20 Nov 2023 07:00  --------------------------------------------------------  IN: 0 mL / OUT: 1050 mL / NET: -1050 mL            CAPILLARY BLOOD GLUCOSE      RADIOLOGY & ADDITIONAL TESTS:                   PGY-1 Progress Note discussed with attending      PLEASE CONTACT ON CALL TEAM:  - On Call Team (Please refer to Rosa) FROM 5:00 PM - 8:30PM  - Nightfloat Team FROM 8:30 -7:30 AM    INTERVAL HPI/OVERNIGHT EVENTS:   Overnight pt was hypertensive (197/81), BP meds were held, BP has since normalized, most recently 123/70. Of note, pt also had a hypotensive episodes earlier in the day, yesterday, which improved after a 1L bolus. Patient examined at bedside this AM.  Patient denies acute complaints.    REVIEW OF SYSTEMS:  CONSTITUTIONAL: No fever, weight loss, or fatigue  RESPIRATORY: No cough, wheezing,  or hemoptysis; No shortness of breath  CARDIOVASCULAR: No chest pain, palpitations, dizziness, or leg swelling  GASTROINTESTINAL: No abdominal pain. No nausea, vomiting, or hematemesis; No diarrhea or constipation. No melena or hematochezia.  GENITOURINARY: No dysuria, hematuria, or urinary frequency  NEUROLOGICAL: No headaches, memory loss. No focal weakness, numbness, or tremors  MSK: +Left leg feels weak and numb  SKIN: No itching, burning, or rashes    MEDICATIONS  (STANDING):  ascorbic acid 500 milliGRAM(s) Oral daily  aspirin  chewable 81 milliGRAM(s) Oral daily  atorvastatin 40 milliGRAM(s) Oral at bedtime  digoxin     Tablet 125 MICROGram(s) Oral every other day  ferrous    sulfate 325 milliGRAM(s) Oral daily  heparin   Injectable 5000 Unit(s) SubCutaneous every 8 hours  influenza  Vaccine (HIGH DOSE) 0.7 milliLiter(s) IntraMuscular once  iron sucrose IVPB 200 milliGRAM(s) IV Intermittent every 24 hours  lactated ringers. 1000 milliLiter(s) (70 mL/Hr) IV Continuous <Continuous>  metoprolol tartrate 12.5 milliGRAM(s) Oral every 8 hours  polyethylene glycol 3350 17 Gram(s) Oral at bedtime  tamsulosin 0.4 milliGRAM(s) Oral at bedtime    MEDICATIONS  (PRN):  acetaminophen     Tablet .. 650 milliGRAM(s) Oral every 6 hours PRN Temp greater or equal to 38C (100.4F), Mild Pain (1 - 3)      Vital Signs Last 24 Hrs  T(C): 36.7 (20 Nov 2023 07:40), Max: 37.2 (20 Nov 2023 05:14)  T(F): 98.1 (20 Nov 2023 07:40), Max: 99 (20 Nov 2023 05:14)  HR: 84 (20 Nov 2023 07:40) (82 - 94)  BP: 123/70 (20 Nov 2023 07:40) (67/44 - 197/181)  BP(mean): --  RR: 18 (20 Nov 2023 07:40) (18 - 18)  SpO2: 94% (20 Nov 2023 07:40) (94% - 97%)    Parameters below as of 20 Nov 2023 07:40  Patient On (Oxygen Delivery Method): room air        PHYSICAL EXAMINATION:  GENERAL: NAD, sitting in chair  HEAD:  Atraumatic, Normocephalic  EYES:  Conjunctiva and sclera clear  NECK: Supple. No JVD. Normal thyroid  CHEST/LUNG: Clear to auscultation. No rales or wheezing  HEART: Regular rate and rhythm. No abnormal heart sounds  ABDOMEN: Soft, nontender, and nondistended. Bowel sounds present. No pain or masses on palpation  NERVOUS SYSTEM:  Alert & Oriented X3  EXTREMITIES: Limited weight bearing on left leg. 2+ Peripheral Pulses. No appreciable edema  SKIN: warm dry                          7.9    5.92  )-----------( 120      ( 20 Nov 2023 07:03 )             24.1     11-20    139  |  108  |  36<H>  ----------------------------<  96  4.1   |  26  |  1.75<H>    Ca    7.7<L>      20 Nov 2023 07:03  Phos  3.0     11-20  Mg     1.9     11-20                I&O's Summary    19 Nov 2023 07:01  -  20 Nov 2023 07:00  --------------------------------------------------------  IN: 0 mL / OUT: 1050 mL / NET: -1050 mL            CAPILLARY BLOOD GLUCOSE      RADIOLOGY & ADDITIONAL TESTS:                   PGY-1 Progress Note discussed with attending      PLEASE CONTACT ON CALL TEAM:  - On Call Team (Please refer to Rosa) FROM 5:00 PM - 8:30PM  - Nightfloat Team FROM 8:30 -7:30 AM    INTERVAL HPI/OVERNIGHT EVENTS:   Overnight pt was hypertensive (197/81), BP has since normalized, most recently 123/70. Of note, pt also had a hypotensive episodes earlier in the day, yesterday, which improved after a 1L bolus. Patient examined at bedside this AM.  Patient denies acute complaints.    REVIEW OF SYSTEMS:  CONSTITUTIONAL: No fever, weight loss, or fatigue  RESPIRATORY: No cough, wheezing,  or hemoptysis; No shortness of breath  CARDIOVASCULAR: No chest pain, palpitations, dizziness, or leg swelling  GASTROINTESTINAL: No abdominal pain. No nausea, vomiting, or hematemesis; No diarrhea or constipation. No melena or hematochezia.  GENITOURINARY: No dysuria, hematuria, or urinary frequency  NEUROLOGICAL: No headaches, memory loss. No focal weakness, numbness, or tremors  MSK: +Left leg feels weak and numb  SKIN: No itching, burning, or rashes    MEDICATIONS  (STANDING):  ascorbic acid 500 milliGRAM(s) Oral daily  aspirin  chewable 81 milliGRAM(s) Oral daily  atorvastatin 40 milliGRAM(s) Oral at bedtime  digoxin     Tablet 125 MICROGram(s) Oral every other day  ferrous    sulfate 325 milliGRAM(s) Oral daily  heparin   Injectable 5000 Unit(s) SubCutaneous every 8 hours  influenza  Vaccine (HIGH DOSE) 0.7 milliLiter(s) IntraMuscular once  iron sucrose IVPB 200 milliGRAM(s) IV Intermittent every 24 hours  lactated ringers. 1000 milliLiter(s) (70 mL/Hr) IV Continuous <Continuous>  metoprolol tartrate 12.5 milliGRAM(s) Oral every 8 hours  polyethylene glycol 3350 17 Gram(s) Oral at bedtime  tamsulosin 0.4 milliGRAM(s) Oral at bedtime    MEDICATIONS  (PRN):  acetaminophen     Tablet .. 650 milliGRAM(s) Oral every 6 hours PRN Temp greater or equal to 38C (100.4F), Mild Pain (1 - 3)      Vital Signs Last 24 Hrs  T(C): 36.7 (20 Nov 2023 07:40), Max: 37.2 (20 Nov 2023 05:14)  T(F): 98.1 (20 Nov 2023 07:40), Max: 99 (20 Nov 2023 05:14)  HR: 84 (20 Nov 2023 07:40) (82 - 94)  BP: 123/70 (20 Nov 2023 07:40) (67/44 - 197/181)  BP(mean): --  RR: 18 (20 Nov 2023 07:40) (18 - 18)  SpO2: 94% (20 Nov 2023 07:40) (94% - 97%)    Parameters below as of 20 Nov 2023 07:40  Patient On (Oxygen Delivery Method): room air        PHYSICAL EXAMINATION:  GENERAL: NAD, sitting in chair  HEAD:  Atraumatic, Normocephalic  EYES:  Conjunctiva and sclera clear  NECK: Supple. No JVD. Normal thyroid  CHEST/LUNG: Clear to auscultation. No rales or wheezing  HEART: Regular rate and rhythm. No abnormal heart sounds  ABDOMEN: Soft, nontender, and nondistended. Bowel sounds present. No pain or masses on palpation  NERVOUS SYSTEM:  Alert & Oriented X3  EXTREMITIES: Limited weight bearing on left leg. 2+ Peripheral Pulses. No appreciable edema  SKIN: warm dry                          7.9    5.92  )-----------( 120      ( 20 Nov 2023 07:03 )             24.1     11-20    139  |  108  |  36<H>  ----------------------------<  96  4.1   |  26  |  1.75<H>    Ca    7.7<L>      20 Nov 2023 07:03  Phos  3.0     11-20  Mg     1.9     11-20                I&O's Summary    19 Nov 2023 07:01  -  20 Nov 2023 07:00  --------------------------------------------------------  IN: 0 mL / OUT: 1050 mL / NET: -1050 mL            CAPILLARY BLOOD GLUCOSE      RADIOLOGY & ADDITIONAL TESTS:

## 2023-11-20 NOTE — PROGRESS NOTE ADULT - ASSESSMENT
Patient is a 89 year old male from home, ambulates independently, with PMH of HTN, HLD, and BPH who presented to the ED after falling at home.  Patient with CT Head in ED without acute ischemia or hemorrage.  F/U CTA head.  Patients lab work showing VAMSI, anemia.  EKG Showing 1st degree AV block. .  Patient is being admitted to tele for mechical fall, VAMSI, 1st degree av block, and new onset paroxysmal afib.  89 year old male from home, ambulates independently, with PMH of HTN, HLD, and BPH presented to the ED after falling at home.  Patient with CT Head in ED without acute ischemia or hemorrage, however labwork was significant for VAMSI and anemia, and EKG revealed 1st degree AV block. Patient admitted to tele which also revealed afib. Pt remaining on telemetry for further medical optimization.

## 2023-11-20 NOTE — PROGRESS NOTE ADULT - PROBLEM SELECTOR PROBLEM 1
Per Dr. Tan De La Paz Atrial fibrillation, new onset

## 2023-11-20 NOTE — PROGRESS NOTE ADULT - ATTENDING COMMENTS
Patient is a 89 yo M from home, lives alone, PMHx of HTN, HLD, BPH, presenting after an episode of fall. Patient states he was running to catch a mouse at home when he tripped at fell over, denies syncope. Admitted for fall and further w/u     Patient seen and examined, he was sitting in chair but c/o having left leg pain. knee pain. he is able to bend his knee, has good ROM at ankle but limited at hip. He is not able to wear weight on his Left leg. Patient is a 89 yo M from home, lives alone, PMHx of HTN, HLD, BPH, presenting after an episode of fall. Patient states he was running to catch a mouse at home when he tripped at fell over, denies syncope. Admitted for fall and further w/u     Patient seen and examined, he was sitting in chair but c/o having left leg pain. knee pain. he is able to bend his knee, has good ROM at ankle but limited at hip. He is not able to wear weight on his Left leg. he was hypotensive yesterday and was given 1L IVF Bolus.     PE as above     Labs reviewed- cbc, bmp, lfts       A/P:  #New onset Afib w/ RVR- now rate controlled   #s/p mechanical fall - r/o left hip fx   #Acute urinary retention s/p straight cath   #Macrocytic Anemia   #Suspect CKD- unknown baseline   #Hypomagnesemia- resolved   #HTN  #HLD  #BPH  #DVT ppx     Plan:  -Pt w/ mechanical fall, no syncope reported. X-ray tibia/fibula/knee/ankle and foot reviewed- no acute fx. However, he continues to have difficulty in ambulation and it appears that his ROM is limited at his left hip even though he primarily complaints of left knee pain. Given on-going symptoms and in light of negative x-ray, will get CT left hip and knee for further evaluation and r/o occult fx.   -Pt noted w/ new onset Afib on tele, CHADSVASC 3- on asa given high risk of falls. Follow ECHO, c/w dig, change metoprolol to 25 ER   -pt w/ macrocytic anemia, folate, vitamin B12 wnl. MM w/u in progress. Anemia panel w/ mixed picture, c/w IV iron   -Patient developed acute urinary retention s/p straight cath on 11/19, will get post void bladder scan today. C/w tamsulosin   -C/w heparin SC.   -PT recommended MINDY, choices given. CM aware Patient is a 89 yo M from home, lives alone, PMHx of HTN, HLD, BPH, presenting after an episode of fall. Patient states he was running to catch a mouse at home when he tripped at fell over, denies syncope. Admitted for fall and further w/u     Patient seen and examined, he was sitting in chair but c/o having left leg pain. knee pain. he is able to bend his knee, has good ROM at ankle but limited at hip. He is not able to wear weight on his Left leg. he was hypotensive yesterday and was given 1L IVF Bolus.     PE as above     Labs reviewed- cbc, bmp, lfts       A/P:  #New onset Afib w/ RVR- now rate controlled   #s/p mechanical fall - r/o left hip fx   #Acute urinary retention s/p straight cath   #Macrocytic Anemia   #Suspect CKD- unknown baseline   #Hypomagnesemia- resolved   #HTN  #HLD  #BPH  #DVT ppx     Plan:  -Pt w/ mechanical fall, no syncope reported. X-ray tibia/fibula/knee/ankle and foot reviewed- no acute fx. However, he continues to have difficulty in ambulation and it appears that his ROM is limited at his left hip even though he primarily complaints of left knee pain. Given on-going symptoms and in light of negative x-ray, will get CT left hip and knee for further evaluation and r/o occult fx.   -Pt noted w/ new onset Afib on tele, CHADSVASC 3- on asa given high risk of falls. Follow ECHO, c/w dig, change metoprolol to 25 ER   -pt w/ macrocytic anemia, folate, vitamin B12 wnl. MM w/u in progress. Anemia panel w/ mixed picture, c/w IV iron   -Patient developed acute urinary retention s/p straight cath on 11/19, will get post void bladder scan today. C/w tamsulosin   -Scr is stable at 1.7, suspect that it's around his baseline. Resident team to reach out to his PCP to get prior records.   -C/w heparin SC.   -PT recommended MINDY, choices given. CM aware

## 2023-11-20 NOTE — PHARMACOTHERAPY INTERVENTION NOTE - COMMENTS
Duplicate orders with IV iron sucrose and oral ferrous sulfate. Suggest to discontinue oral ferrous sulfate. 
On metoprolol tartrate 12.5 mg every 8 hours for rate control for atrial fibrillation. Suggest to consolidate to metoprolol ER 25 mg once daily starting at 14:00.

## 2023-11-20 NOTE — PROGRESS NOTE ADULT - ASSESSMENT
89 year old male from home, ambulates independently, with PMH of HTN, HLD, and BPH who presented to the ED after falling at home,VAMSI on CRI?,anemia and now new onset afib,FE def anemia.  1.Tele monitoring.  2.Afib-fall/bleed risk-no AC asa,lopressor 12.5mg q8,dig .125mg qod.  3.VAMSI on CRI-IVF(s/p IV contrast).Urinary retention-s/p straight cath.  4.Echocardiogram.  5.Anemia-IV Iron.  6.HTN-b blocker.  7.Lipid d/o-statin.  8.GI and DVT prophylaxis.

## 2023-11-20 NOTE — PROGRESS NOTE ADULT - SUBJECTIVE AND OBJECTIVE BOX
Patient is a 89y old  Male who presents with a chief complaint of Mechanical fall       SUBJECTIVE / OVERNIGHT EVENTS:      MEDICATIONS  (STANDING):  ascorbic acid 500 milliGRAM(s) Oral daily  aspirin  chewable 81 milliGRAM(s) Oral daily  atorvastatin 40 milliGRAM(s) Oral at bedtime  digoxin     Tablet 125 MICROGram(s) Oral every other day  heparin   Injectable 5000 Unit(s) SubCutaneous every 8 hours  influenza  Vaccine (HIGH DOSE) 0.7 milliLiter(s) IntraMuscular once  iron sucrose IVPB 200 milliGRAM(s) IV Intermittent every 24 hours  lactated ringers. 1000 milliLiter(s) (70 mL/Hr) IV Continuous <Continuous>  metoprolol succinate ER 25 milliGRAM(s) Oral daily  polyethylene glycol 3350 17 Gram(s) Oral at bedtime  tamsulosin 0.4 milliGRAM(s) Oral at bedtime    MEDICATIONS  (PRN):  acetaminophen     Tablet .. 650 milliGRAM(s) Oral every 6 hours PRN Temp greater or equal to 38C (100.4F), Mild Pain (1 - 3)    CAPILLARY BLOOD GLUCOSE        I&O's Summary    19 Nov 2023 07:01  -  20 Nov 2023 07:00  --------------------------------------------------------  IN: 0 mL / OUT: 1050 mL / NET: -1050 mL        PHYSICAL EXAM:  Vital Signs Last 24 Hrs  T(C): 36.7 (20 Nov 2023 11:07), Max: 37.2 (20 Nov 2023 05:14)  T(F): 98.1 (20 Nov 2023 11:07), Max: 99 (20 Nov 2023 05:14)  HR: 100 (20 Nov 2023 11:07) (82 - 100)  BP: 111/55 (20 Nov 2023 11:07) (67/44 - 197/181)  BP(mean): --  RR: 18 (20 Nov 2023 11:07) (18 - 18)  SpO2: 95% (20 Nov 2023 11:07) (94% - 97%)    Parameters below as of 20 Nov 2023 11:07  Patient On (Oxygen Delivery Method): room air          LABS:                        7.9    5.92  )-----------( 120      ( 20 Nov 2023 07:03 )             24.1     11-20    139  |  108  |  36<H>  ----------------------------<  96  4.1   |  26  |  1.75<H>    Ca    7.7<L>      20 Nov 2023 07:03  Phos  3.0     11-20  Mg     1.9     11-20            Urinalysis Basic - ( 20 Nov 2023 07:03 )    Color: x / Appearance: x / SG: x / pH: x  Gluc: 96 mg/dL / Ketone: x  / Bili: x / Urobili: x   Blood: x / Protein: x / Nitrite: x   Leuk Esterase: x / RBC: x / WBC x   Sq Epi: x / Non Sq Epi: x / Bacteria: x             Patient is a 89y old  Male who presents with a chief complaint of Mechanical fall       SUBJECTIVE / OVERNIGHT EVENTS: events noted. Pt c/o continued LLE weakness      MEDICATIONS  (STANDING):  ascorbic acid 500 milliGRAM(s) Oral daily  aspirin  chewable 81 milliGRAM(s) Oral daily  atorvastatin 40 milliGRAM(s) Oral at bedtime  digoxin     Tablet 125 MICROGram(s) Oral every other day  heparin   Injectable 5000 Unit(s) SubCutaneous every 8 hours  influenza  Vaccine (HIGH DOSE) 0.7 milliLiter(s) IntraMuscular once  iron sucrose IVPB 200 milliGRAM(s) IV Intermittent every 24 hours  lactated ringers. 1000 milliLiter(s) (70 mL/Hr) IV Continuous <Continuous>  metoprolol succinate ER 25 milliGRAM(s) Oral daily  polyethylene glycol 3350 17 Gram(s) Oral at bedtime  tamsulosin 0.4 milliGRAM(s) Oral at bedtime    MEDICATIONS  (PRN):  acetaminophen     Tablet .. 650 milliGRAM(s) Oral every 6 hours PRN Temp greater or equal to 38C (100.4F), Mild Pain (1 - 3)    CAPILLARY BLOOD GLUCOSE        I&O's Summary    19 Nov 2023 07:01  -  20 Nov 2023 07:00  --------------------------------------------------------  IN: 0 mL / OUT: 1050 mL / NET: -1050 mL        PHYSICAL EXAM:  Vital Signs Last 24 Hrs  T(C): 36.7 (20 Nov 2023 11:07), Max: 37.2 (20 Nov 2023 05:14)  T(F): 98.1 (20 Nov 2023 11:07), Max: 99 (20 Nov 2023 05:14)  HR: 100 (20 Nov 2023 11:07) (82 - 100)  BP: 111/55 (20 Nov 2023 11:07) (67/44 - 197/181)  BP(mean): --  RR: 18 (20 Nov 2023 11:07) (18 - 18)  SpO2: 95% (20 Nov 2023 11:07) (94% - 97%)    Parameters below as of 20 Nov 2023 11:07  Patient On (Oxygen Delivery Method): room air      GEN: NAD; A and O x 3, OOB to chair  LUNGS: CTA B/L  HEART: S1 S2  ABDOMEN: soft, non-tender, non-distended, + BS  EXTREMITIES: no edema        LABS:                        7.9    5.92  )-----------( 120      ( 20 Nov 2023 07:03 )             24.1     11-20    139  |  108  |  36<H>  ----------------------------<  96  4.1   |  26  |  1.75<H>    Ca    7.7<L>      20 Nov 2023 07:03  Phos  3.0     11-20  Mg     1.9     11-20            Urinalysis Basic - ( 20 Nov 2023 07:03 )    Color: x / Appearance: x / SG: x / pH: x  Gluc: 96 mg/dL / Ketone: x  / Bili: x / Urobili: x   Blood: x / Protein: x / Nitrite: x   Leuk Esterase: x / RBC: x / WBC x   Sq Epi: x / Non Sq Epi: x / Bacteria: x

## 2023-11-20 NOTE — PROGRESS NOTE ADULT - ASSESSMENT
complete note to follow    #VTE Prophylaxis     Assessment and Recommendation:   · Assessment	    #Macrocytic Anemia  p/w Hgb=9.7 FOS=362 and today 7.9  pt states he had a few days with black stool recently when he was taking an antibiotic  renal insufficiency, Cr today 1.75  TSH/B12/folate levels are nl  iron studies show elevated ferritin with low TIBC and trans sat 4%, c/w ACD with iron def  C-spine CT showed 1.4cm ovoid lesion in naopharynx cyst vs other  Rec's:  -CT C/A/P r/o occult malignancy  -given macrocytosis, check Flow Cyt  -dropping H/H  -PRBC transfusion if Hgb <7.0 or symptomatic  -Daily CBC  -GI consult for anemia, low trans sat, recent melena  -MM w/u pending  -outpt ENT   further recommendations pending above    #Leg weakness  further imaging  mgmt as per Medicine Team    #VTE Prophylaxis    Thank you for the referral. Will continue to monitor the patient.  Please call with any questions [phone]  Above reviewed with Attending Dr. Wiliam JALLOH/[facility]  [address] [facility], [address], [place], [place]  [phone]  *Note not finalized until signed by Attending Physician

## 2023-11-20 NOTE — PROGRESS NOTE ADULT - PROBLEM SELECTOR PLAN 6
- Patient with history of HLD  - Patient on Simvastatin 40mg at home  - Continue home antihyperlipidemic Patient with history of HLD on Simvastatin 40mg at home  - Atorvastatin 40mg while inpatient

## 2023-11-20 NOTE — PROGRESS NOTE ADULT - PROBLEM SELECTOR PLAN 3
hx of BPH on flomax  - SCr of 2 on admission  - Unclear baseline  - denies history of kidney injury  - c/w IV Fluids  - Mg2+ 1.3-->replace with 2g IV  - Trend sCr 2.0-->1.7 -> 1.75  - F/U post-void bladder scan and Renal US  - F/U BMP daily   - F/U SPEP/UPEP, kappa/lambda ratio, UA with urine lytes  - Heme/Onc consulted [facility] for concern for myeloma  - pre-renal component; bladder scan 11/19 showing 525 cc x1 straight cath Pt hx of BPH on flomax  - SCr of 2 on admission  - Unclear baseline  - denies history of kidney injury  - SCr has improved, but has remained elevated. Most recently 1.75  - Pt likely has undiagnosed CKD  - Post-renal component; bladder scan 11/19 showing 525 cc x1 straight cath  - F/U post-void bladder scan and Renal US  - F/U BMP daily   - Heme/Onc consulted [facility] for concern for myeloma  - f/u SPEP/UPEP, kappa/lambda ratio, UA with urine lytes

## 2023-11-21 NOTE — DISCHARGE NOTE PROVIDER - NSDCFUADDAPPT_GEN_ALL_CORE_FT
Orthopedically stable for discharge: Patient to follow up with Dr. Escobar in one week in office at [phone].

## 2023-11-21 NOTE — PROGRESS NOTE ADULT - PROBLEM SELECTOR PLAN 2
Pt presented after episode of falling at home  - hemodynamically stable and afebrile  - CT Head without ischemia or hemorrage, CTA head no stenosis, dolichoectasia of L supraclinoid ICA  - Tele showing 1st degree av block and new onset Afib  - Cardio plan as above  - PT consulted > rec MINDY   - Pt still complaining of reduced weight bearing on left leg, ordered CT left hip/leg > f/u results  - c/w Pain control with Acetaminophen Pt presented after episode of falling at home  - hemodynamically stable and afebrile  - CT Head without ischemia or hemorrhage, CTA head no stenosis, dolichoectasia of L supraclinoid ICA  - Tele showing 1st degree av block and new onset Afib  - Cardio plan as above  - PT consulted > rec MINDY   -CT left leg showed L, non-displaced oblique fibular head fracture. Ortho placed ACE wrap and knee immobilizer, recommend NWB PT of left lower extremity in knee immobilizer in wheelchair. Orthopedically stable discharge, and patient will f/u with Dr. Escobar in office in 1 week.   - c/w Pain control with Acetaminophen Pt presented after episode of falling at home  - hemodynamically stable and afebrile  - CT Head without ischemia or hemorrhage, CTA head no stenosis, dolichoectasia of L supraclinoid ICA  - Tele showing 1st degree av block and new onset Afib  - Cardio plan as above  - PT consulted > rec MINDY   - CT left leg showed L, non-displaced oblique fibular head fracture. Ortho placed ACE wrap and knee immobilizer, recommend NWB PT of left lower extremity in knee immobilizer in wheelchair. Orthopedically stable discharge, and patient will f/u with Dr. Escobar in office in 1 week.   - c/w Pain control with Acetaminophen

## 2023-11-21 NOTE — PROGRESS NOTE ADULT - PROBLEM SELECTOR PLAN 4
Pt p/w Hb of 9.7; MCV of 102  - Vitamin b12 and folate wnl  - c/w PO  ferrous sulfate 325mg, vit C 500mg  - heme/onc consulted Pt p/w Hb of 9.7; MCV of 102  - Vitamin b12 and folate wnl  -Heme/Onc following patient, recommend CT A/P to r/o malignancy due to a hx of dark stools, continuing anemia with elevated ferritin/lowTIBC. Patient states he has never had a screening colonoscopy. Rec PRBC transfusion should HgB < 7.0 or symptomatic.   -flow cytometry pending > f/u results   -Multiple myeloma panel pending > f/u results   - c/w PO  ferrous sulfate 325mg, vit C 500mg  - heme/onc consulted Pt p/w Hb of 9.7; MCV of 102  - Vitamin b12 and folate wnl  -Heme/Onc following patient, recommend CT A/P to r/o malignancy due to a hx of dark stools, continuing anemia with elevated ferritin/lowTIBC. Patient states he has never had a screening colonoscopy. Rec PRBC transfusion should HgB < 7.0 or symptomatic.   - flow cytometry pending > f/u results   - Multiple myeloma panel pending > f/u results  - f/u CT A/P   - c/w PO  ferrous sulfate 325mg, vit C 500mg  - heme/onc consulted

## 2023-11-21 NOTE — PROGRESS NOTE ADULT - SUBJECTIVE AND OBJECTIVE BOX
PGY-1 Progress Note discussed with attending      PLEASE CONTACT ON CALL TEAM:  - On Call Team (Please refer to Rosa) FROM 5:00 PM - 8:30PM  - Nightfloat Team FROM 8:30 -7:30 AM    INTERVAL HPI/OVERNIGHT EVENTS:       REVIEW OF SYSTEMS:  CONSTITUTIONAL: No fever, weight loss, or fatigue  RESPIRATORY: No cough, wheezing,  or hemoptysis; No shortness of breath  CARDIOVASCULAR: No chest pain, palpitations, dizziness, or leg swelling  GASTROINTESTINAL: No abdominal pain. No nausea, vomiting, or hematemesis; No diarrhea or constipation. No melena or hematochezia.  GENITOURINARY: No dysuria, hematuria, or urinary frequency  NEUROLOGICAL: No headaches, memory loss. No focal weakness, numbness, or tremors  SKIN: No itching, burning, or rashes    MEDICATIONS  (STANDING):  ascorbic acid 500 milliGRAM(s) Oral daily  aspirin  chewable 81 milliGRAM(s) Oral daily  atorvastatin 40 milliGRAM(s) Oral at bedtime  digoxin     Tablet 125 MICROGram(s) Oral every other day  heparin   Injectable 5000 Unit(s) SubCutaneous every 8 hours  influenza  Vaccine (HIGH DOSE) 0.7 milliLiter(s) IntraMuscular once  iron sucrose IVPB 200 milliGRAM(s) IV Intermittent every 24 hours  lactated ringers. 1000 milliLiter(s) (70 mL/Hr) IV Continuous <Continuous>  metoprolol succinate ER 25 milliGRAM(s) Oral daily  polyethylene glycol 3350 17 Gram(s) Oral at bedtime  tamsulosin 0.4 milliGRAM(s) Oral at bedtime    MEDICATIONS  (PRN):  acetaminophen     Tablet .. 650 milliGRAM(s) Oral every 6 hours PRN Temp greater or equal to 38C (100.4F), Mild Pain (1 - 3)      Vital Signs Last 24 Hrs  T(C): 36.2 (21 Nov 2023 10:37), Max: 37 (21 Nov 2023 04:47)  T(F): 97.2 (21 Nov 2023 10:37), Max: 98.6 (21 Nov 2023 04:47)  HR: 96 (21 Nov 2023 10:37) (94 - 105)  BP: 100/55 (21 Nov 2023 10:37) (100/55 - 137/69)  BP(mean): --  RR: 18 (21 Nov 2023 10:37) (18 - 18)  SpO2: 94% (21 Nov 2023 10:37) (94% - 96%)    Parameters below as of 21 Nov 2023 10:37  Patient On (Oxygen Delivery Method): room air        PHYSICAL EXAMINATION:  GENERAL: NAD, lying comfortably in bed  HEAD:  Atraumatic, Normocephalic  EYES:  Conjunctiva and sclera clear  NECK: Supple. No JVD. Normal thyroid  CHEST/LUNG: Clear to auscultation. No rales or wheezing  HEART: Regular rate and rhythm. No abnormal heart sounds  ABDOMEN: Soft, nontender, and nondistended. Bowel sounds present. No pain or masses on palpation  NERVOUS SYSTEM:  Alert & Oriented X3  EXTREMITIES:  2+ Peripheral Pulses. No appreciable edema  SKIN: warm dry                          8.3    5.71  )-----------( 144      ( 21 Nov 2023 07:48 )             25.4     11-21    140  |  110<H>  |  39<H>  ----------------------------<  101<H>  4.2   |  27  |  1.64<H>    Ca    8.1<L>      21 Nov 2023 07:48  Phos  2.7     11-21  Mg     1.6     11-21    TPro  5.4<L>  /  Alb  2.3<L>  /  TBili  0.5  /  DBili  x   /  AST  22  /  ALT  19  /  AlkPhos  41  11-21    LIVER FUNCTIONS - ( 21 Nov 2023 07:48 )  Alb: 2.3 g/dL / Pro: 5.4 g/dL / ALK PHOS: 41 U/L / ALT: 19 U/L DA / AST: 22 U/L / GGT: x                   I&O's Summary    20 Nov 2023 07:01  -  21 Nov 2023 07:00  --------------------------------------------------------  IN: 950 mL / OUT: 500 mL / NET: 450 mL            CAPILLARY BLOOD GLUCOSE      RADIOLOGY & ADDITIONAL TESTS:                   PGY-1 Progress Note discussed with attending      PLEASE CONTACT ON CALL TEAM:  - On Call Team (Please refer to Rosa) FROM 5:00 PM - 8:30PM  - Nightfloat Team FROM 8:30 -7:30 AM    INTERVAL HPI/OVERNIGHT EVENTS: No acute events overnight. Patient hemodynamically stable, states he feels well and has no complaints at this time.       REVIEW OF SYSTEMS:  CONSTITUTIONAL: No fever, weight loss, or fatigue  RESPIRATORY: No cough, wheezing,  or hemoptysis; No shortness of breath  CARDIOVASCULAR: No chest pain, palpitations, dizziness, or leg swelling  GASTROINTESTINAL: No abdominal pain. No nausea, vomiting, or hematemesis; No diarrhea or constipation. No melena or hematochezia.  GENITOURINARY: No dysuria, hematuria, or urinary frequency  NEUROLOGICAL: No headaches, memory loss. No focal weakness, numbness, or tremors  SKIN: No itching, burning, or rashes    MEDICATIONS  (STANDING):  ascorbic acid 500 milliGRAM(s) Oral daily  aspirin  chewable 81 milliGRAM(s) Oral daily  atorvastatin 40 milliGRAM(s) Oral at bedtime  digoxin     Tablet 125 MICROGram(s) Oral every other day  heparin   Injectable 5000 Unit(s) SubCutaneous every 8 hours  influenza  Vaccine (HIGH DOSE) 0.7 milliLiter(s) IntraMuscular once  iron sucrose IVPB 200 milliGRAM(s) IV Intermittent every 24 hours  lactated ringers. 1000 milliLiter(s) (70 mL/Hr) IV Continuous <Continuous>  metoprolol succinate ER 25 milliGRAM(s) Oral daily  polyethylene glycol 3350 17 Gram(s) Oral at bedtime  tamsulosin 0.4 milliGRAM(s) Oral at bedtime    MEDICATIONS  (PRN):  acetaminophen     Tablet .. 650 milliGRAM(s) Oral every 6 hours PRN Temp greater or equal to 38C (100.4F), Mild Pain (1 - 3)      Vital Signs Last 24 Hrs  T(C): 36.2 (21 Nov 2023 10:37), Max: 37 (21 Nov 2023 04:47)  T(F): 97.2 (21 Nov 2023 10:37), Max: 98.6 (21 Nov 2023 04:47)  HR: 96 (21 Nov 2023 10:37) (94 - 105)  BP: 100/55 (21 Nov 2023 10:37) (100/55 - 137/69)  BP(mean): --  RR: 18 (21 Nov 2023 10:37) (18 - 18)  SpO2: 94% (21 Nov 2023 10:37) (94% - 96%)    Parameters below as of 21 Nov 2023 10:37  Patient On (Oxygen Delivery Method): room air        PHYSICAL EXAMINATION:  GENERAL: NAD, lying comfortably in bed  HEAD:  Atraumatic, Normocephalic  EYES:  Conjunctiva and sclera clear  NECK: Supple. No JVD. Normal thyroid  CHEST/LUNG: Clear to auscultation. No rales or wheezing  HEART: Regular rate and rhythm. No abnormal heart sounds  ABDOMEN: Soft, nontender, and nondistended. Bowel sounds present. No pain or masses on palpation  NERVOUS SYSTEM:  Alert & Oriented X3  EXTREMITIES:  2+ Peripheral Pulses. No appreciable edema. There is mild ecchymosis along the left lateral leg below the knee that corresponds with the level of the fibula. Pt denies tenderness to palpation, good ROM of both UE/LE, +5 strength both UE/LE bilaterally.   SKIN: warm dry                          8.3    5.71  )-----------( 144      ( 21 Nov 2023 07:48 )             25.4     11-21    140  |  110<H>  |  39<H>  ----------------------------<  101<H>  4.2   |  27  |  1.64<H>    Ca    8.1<L>      21 Nov 2023 07:48  Phos  2.7     11-21  Mg     1.6     11-21    TPro  5.4<L>  /  Alb  2.3<L>  /  TBili  0.5  /  DBili  x   /  AST  22  /  ALT  19  /  AlkPhos  41  11-21    LIVER FUNCTIONS - ( 21 Nov 2023 07:48 )  Alb: 2.3 g/dL / Pro: 5.4 g/dL / ALK PHOS: 41 U/L / ALT: 19 U/L DA / AST: 22 U/L / GGT: x                   I&O's Summary    20 Nov 2023 07:01  -  21 Nov 2023 07:00  --------------------------------------------------------  IN: 950 mL / OUT: 500 mL / NET: 450 mL            CAPILLARY BLOOD GLUCOSE      RADIOLOGY & ADDITIONAL TESTS:    ACC: [id number] EXAM: CT LWR EXT LT ORDERED BY: EDUAR KNOTT    ACC: [id number] EXAM: CT 3D RECONSTRUCT WO JENNIFER ORDERED BY: EDUAR KNOTT    ACC: [id number] EXAM: CT PELVIS BONY ONLY ORDERED BY: EDUAR KNOTT    PROCEDURE DATE: 11/20/2023        INTERPRETATION: EXAMINATION: CT of the pelvis and left lower extremity without contrast    CLINICAL INFORMATION: Left leg pain    TECHNIQUE: Axial CT images were obtained through the pelvis and left knee. Coronal and sagittal reformatted images were made. 3-D reconstruction images were also performed.    FINDINGS: Pelvis: No acute fracture or dislocation is demonstrated. There are no advanced arthritic changes at the hips. There is lower lumbar spondylosis. There is grade 1 anterolisthesis at L5-S1 with chronic bilateral L5 pars defects. There are degenerative changes at the sacroiliac joints and pubic symphysis. The bones are diffusely demineralized, limiting evaluation for acute nondisplaced fractures. There are bladder diverticula. There are vascular calcifications.    Left knee: There is an acute minimally displaced obliquely oriented fracture of the fibular head. 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 There is chondrocalcinosis throughout the joint. There are vascular calcifications. There is anterior subcutaneous soft tissue edema. There is moderate to severe atrophy of the medial gastrocnemius muscle. There are vascular calcifications.    The 3-D reconstruction images confirm the above osseous findings.    IMPRESSION: Pelvis: No acute fracture or dislocation.                   PGY-1 Progress Note discussed with attending      PLEASE CONTACT ON CALL TEAM:  - On Call Team (Please refer to Rosa) FROM 5:00 PM - 8:30PM  - Nightfloat Team FROM 8:30 -7:30 AM    INTERVAL HPI/OVERNIGHT EVENTS:   No acute events overnight. Patient hemodynamically stable, states he feels well and has no complaints at this time.       REVIEW OF SYSTEMS:  CONSTITUTIONAL: No fever, weight loss, or fatigue  RESPIRATORY: No cough, wheezing,  or hemoptysis; No shortness of breath  CARDIOVASCULAR: No chest pain, palpitations, dizziness, or leg swelling  GASTROINTESTINAL: No abdominal pain. No nausea, vomiting, or hematemesis; No diarrhea or constipation. No melena or hematochezia.  GENITOURINARY: No dysuria, hematuria, or urinary frequency  NEUROLOGICAL: No headaches, memory loss. No focal weakness, numbness, or tremors  SKIN: No itching, burning, or rashes    MEDICATIONS  (STANDING):  ascorbic acid 500 milliGRAM(s) Oral daily  aspirin  chewable 81 milliGRAM(s) Oral daily  atorvastatin 40 milliGRAM(s) Oral at bedtime  digoxin     Tablet 125 MICROGram(s) Oral every other day  heparin   Injectable 5000 Unit(s) SubCutaneous every 8 hours  influenza  Vaccine (HIGH DOSE) 0.7 milliLiter(s) IntraMuscular once  iron sucrose IVPB 200 milliGRAM(s) IV Intermittent every 24 hours  lactated ringers. 1000 milliLiter(s) (70 mL/Hr) IV Continuous <Continuous>  metoprolol succinate ER 25 milliGRAM(s) Oral daily  polyethylene glycol 3350 17 Gram(s) Oral at bedtime  tamsulosin 0.4 milliGRAM(s) Oral at bedtime    MEDICATIONS  (PRN):  acetaminophen     Tablet .. 650 milliGRAM(s) Oral every 6 hours PRN Temp greater or equal to 38C (100.4F), Mild Pain (1 - 3)      Vital Signs Last 24 Hrs  T(C): 36.2 (21 Nov 2023 10:37), Max: 37 (21 Nov 2023 04:47)  T(F): 97.2 (21 Nov 2023 10:37), Max: 98.6 (21 Nov 2023 04:47)  HR: 96 (21 Nov 2023 10:37) (94 - 105)  BP: 100/55 (21 Nov 2023 10:37) (100/55 - 137/69)  BP(mean): --  RR: 18 (21 Nov 2023 10:37) (18 - 18)  SpO2: 94% (21 Nov 2023 10:37) (94% - 96%)    Parameters below as of 21 Nov 2023 10:37  Patient On (Oxygen Delivery Method): room air        PHYSICAL EXAMINATION:  GENERAL: NAD, lying comfortably in bed  HEAD:  Atraumatic, Normocephalic  EYES:  Conjunctiva and sclera clear  NECK: Supple. No JVD. Normal thyroid  CHEST/LUNG: Clear to auscultation. No rales or wheezing  HEART: Regular rate and rhythm. No abnormal heart sounds  ABDOMEN: Soft, nontender, and nondistended. Bowel sounds present. No pain or masses on palpation  NERVOUS SYSTEM: Alert & Oriented X3  EXTREMITIES:  2+ Peripheral Pulses. No appreciable edema. There is mild ecchymosis along the left lateral leg below the knee that corresponds with the level of the fibula. Pt denies tenderness to palpation, good ROM of both UE/LE, +5 strength both UE/LE bilaterally.   SKIN: warm dry                          8.3    5.71  )-----------( 144      ( 21 Nov 2023 07:48 )             25.4     11-21    140  |  110<H>  |  39<H>  ----------------------------<  101<H>  4.2   |  27  |  1.64<H>    Ca    8.1<L>      21 Nov 2023 07:48  Phos  2.7     11-21  Mg     1.6     11-21    TPro  5.4<L>  /  Alb  2.3<L>  /  TBili  0.5  /  DBili  x   /  AST  22  /  ALT  19  /  AlkPhos  41  11-21    LIVER FUNCTIONS - ( 21 Nov 2023 07:48 )  Alb: 2.3 g/dL / Pro: 5.4 g/dL / ALK PHOS: 41 U/L / ALT: 19 U/L DA / AST: 22 U/L / GGT: x                   I&O's Summary    20 Nov 2023 07:01  -  21 Nov 2023 07:00  --------------------------------------------------------  IN: 950 mL / OUT: 500 mL / NET: 450 mL            CAPILLARY BLOOD GLUCOSE      RADIOLOGY & ADDITIONAL TESTS:    ACC: [id number] EXAM: CT LWR EXT LT ORDERED BY: EDUAR KNOTT    ACC: [id number] EXAM: CT 3D RECONSTRUCT WO JENNIFER ORDERED BY: EDUAR KNOTT    ACC: [id number] EXAM: CT PELVIS BONY ONLY ORDERED BY: EDUAR KNOTT    PROCEDURE DATE: 11/20/2023        INTERPRETATION: EXAMINATION: CT of the pelvis and left lower extremity without contrast    CLINICAL INFORMATION: Left leg pain    TECHNIQUE: Axial CT images were obtained through the pelvis and left knee. Coronal and sagittal reformatted images were made. 3-D reconstruction images were also performed.    FINDINGS: Pelvis: No acute fracture or dislocation is demonstrated. There are no advanced arthritic changes at the hips. There is lower lumbar spondylosis. There is grade 1 anterolisthesis at L5-S1 with chronic bilateral L5 pars defects. There are degenerative changes at the sacroiliac joints and pubic symphysis. The bones are diffusely demineralized, limiting evaluation for acute nondisplaced fractures. There are bladder diverticula. There are vascular calcifications.    Left knee: There is an acute minimally displaced obliquely oriented fracture of the fibular head. 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 There is chondrocalcinosis throughout the joint. There are vascular calcifications. There is anterior subcutaneous soft tissue edema. There is moderate to severe atrophy of the medial gastrocnemius muscle. There are vascular calcifications.    The 3-D reconstruction images confirm the above osseous findings.    IMPRESSION: Pelvis: No acute fracture or dislocation.

## 2023-11-21 NOTE — PROGRESS NOTE ADULT - ASSESSMENT
89 year old male from home, ambulates independently, with PMH of HTN, HLD, and BPH who presented to the ED after falling at home,VAMSI on CRI?,anemia and now new onset afib,Anemia.  1.Ortho eval.  2.Afib-fall/bleed risk-no AC asa,lopressor 12.5mg q8,dig .125mg qod.  3.VAMSI on CRI-IVF(s/p IV contrast).Urinary retention-s/p straight cath.  4.Left fibular head fx.  5.Anemia- Iron.  6.HTN-b blocker.  7.Lipid d/o-statin.  8.GI and DVT prophylaxis.

## 2023-11-21 NOTE — CONSULT NOTE ADULT - SUBJECTIVE AND OBJECTIVE BOX
Date of Service  11-18-23 @ 12:13    CHIEF COMPLAINT:Patient is a 89y old  Male who presents with a chief complaint of Mechanical fall .      HPI:  Patient is a 89 year old male from home, ambulates independently, with PMH of HTN, HLD, and BPH who presented to the ED after falling at home.  Patient states he was running to catch a mouse at home when he tripped at fell over.  Pateint states he was not dizzy prior to falling.  Patient denies head injury or LOC.  Patients nephew at bedside assisting with history.  Patient was recommended cane or walker due to dizzy episodes however patient previously refused.  Patient states he has episodes of dizziness when going from lying to sitting or sitting to standing.  Pateint states he did not have dizziness today and just lost balance while running after the mouse.  Patient now reports mild left knee pain.  Pateint states it is difficult for him to ambulate with is left knee.  Patient denies nausea, vomiting, headache, blurry vision, dizziness, chest pain, abdominal pain, constipation, diarrhea, leg swelling.  (17 Nov 2023 13:39)      PAST MEDICAL & SURGICAL HISTORY:  HTN (hypertension)      HLD (hyperlipidemia)      BPH (benign prostatic hyperplasia)          MEDICATIONS  (STANDING):  aspirin  chewable 81 milliGRAM(s) Oral daily  atorvastatin 40 milliGRAM(s) Oral at bedtime  digoxin  Injectable 500 MICROGram(s) IV Push once  heparin   Injectable 5000 Unit(s) SubCutaneous every 8 hours  influenza  Vaccine (HIGH DOSE) 0.7 milliLiter(s) IntraMuscular once  lactated ringers. 1000 milliLiter(s) (70 mL/Hr) IV Continuous <Continuous>  metoprolol tartrate 12.5 milliGRAM(s) Oral every 8 hours  tamsulosin 0.4 milliGRAM(s) Oral at bedtime    MEDICATIONS  (PRN):  acetaminophen     Tablet .. 650 milliGRAM(s) Oral every 6 hours PRN Temp greater or equal to 38C (100.4F), Mild Pain (1 - 3)      FAMILY HISTORY:No hx of CAD      SOCIAL HISTORY:    [ x] Non-smoker    [ x] Alcohol-denies    Allergies    penicillin (Hives)    Intolerances    	    REVIEW OF SYSTEMS:  CONSTITUTIONAL: No fever, weight loss, or fatigue  EYES: No eye pain, visual disturbances, or discharge  ENT:  No difficulty hearing, tinnitus, vertigo; No sinus or throat pain  NECK: No pain or stiffness  RESPIRATORY: No cough, wheezing, chills or hemoptysis; No Shortness of Breath  CARDIOVASCULAR: No chest pain, palpitations, passing out, dizziness, or leg swelling  GASTROINTESTINAL: No abdominal or epigastric pain. No nausea, vomiting, or hematemesis; No diarrhea or constipation. No melena or hematochezia.  GENITOURINARY: No dysuria, frequency, hematuria, or incontinence  NEUROLOGICAL: No headaches, memory loss, loss of strength, numbness, or tremors  SKIN: No itching, burning, rashes, or lesions   LYMPH Nodes: No enlarged glands  ENDOCRINE: No heat or cold intolerance; No hair loss  MUSCULOSKELETAL: No joint pain or swelling; No muscle, back, + extremity pain  PSYCHIATRIC: No depression, anxiety, mood swings, or difficulty sleeping  HEME/LYMPH: No easy bruising, or bleeding gums  ALLERGY AND IMMUNOLOGIC: No hives or eczema	        PHYSICAL EXAM:  T(C): 36.9 (11-18-23 @ 07:19), Max: 37.5 (11-18-23 @ 04:57)  HR: 111 (11-18-23 @ 07:19) (79 - 111)  BP: 116/78 (11-18-23 @ 07:19) (115/65 - 151/57)  RR: 19 (11-18-23 @ 07:19) (17 - 19)  SpO2: 94% (11-18-23 @ 07:19) (93% - 98%)        Appearance: Normal	  HEENT:   Normal oral mucosa, PERRL, EOMI	  Lymphatic: No lymphadenopathy  Cardiovascular: Normal S1 S2, No JVD, No murmurs, No edema  Respiratory: Lungs clear to auscultation	  Psychiatry: A & O x 3, Mood & affect appropriate  Gastrointestinal:  Soft, Non-tender, + BS	  Skin: No rashes, No ecchymoses, No cyanosis	  Neurologic: Non-focal  Extremities: Normal range of motion, No clubbing, cyanosis or edema  Vascular: Peripheral pulses palpable 2+ bilaterally    TELEMETRY: afib with RVR upto 140's	      ECG:  nsr,first degree av block	  	  	  LABS:	 	                         9.7    11.94 )-----------( 166      ( 17 Nov 2023 13:12 )             30.3     11-18    140  |  109<H>  |  29<H>  ----------------------------<  97  4.4   |  25  |  1.70<H>    Ca    8.7      18 Nov 2023 06:39  Phos  2.7     11-18  Mg     1.3     11-18    TPro  7.1  /  Alb  3.3<L>  /  TBili  1.0  /  DBili  x   /  AST  15  /  ALT  24  /  AlkPhos  66  11-17        < from: CT Angio Neck w/ IV Cont (11.17.23 @ 15:27) >  ACC: [id number] EXAM:  CT ANGIO NECK (W)AW IC   ORDERED BY: FERNANDEZ GRAVES     ACC: [id number] EXAM:  CT ANGIO BRAIN (W)AW IC   ORDERED BY: FERNANDEZ GRAVES     PROCEDURE DATE:  11/17/2023          INTERPRETATION:  Clinical indication:  Evaluate for aneurysm, bulbous   appearance of left ICA on noncontrast CT        After the intravenous power injection of 90 cc of Omnipaque 350 using a   bolus tracie timing run serial thin sections were obtained through the   neck from the thoracic inlet through the intracranial circulation   centered at the [id number] on a multislice CT scanner reformatted   with coronal and sagittal 2 D-MIP projections, including 3 D   reconstructions using a separate 3D [employer] software workstation.A total   of  90  cc ofOmnipaque were intravenously injected.  10 cc were   discarded.        There is calcification along the aortic arch and the origin of the left   subclavian artery. The origins of the carotid and vertebral arteries are   normal. The right vertebral artery is dominant, the left vertebral artery   ends in PICA. The right internal carotid artery takes a retropharyngeal   course. Proximal internal carotid arteries are calcified without   significant stenosis      The distal vertebral arteries are wellidentified as are the   posterior-inferior cerebellar arteries bilaterally. The region of the   vertebral basilar junction is normal. The basilar artery is normal. The   posterior cerebral and superior cerebellar arteries are normal.    Evaluation ofthe carotid arteries demonstrate normal appearance to the   distal cervical, petrous, cavernous and right supraclinoid internal   carotid arteries. The left supraclinoid internal carotid artery is   slightly dilated and dolichoectatic as is the left M1 branch.. There is   no aneurysm. The anterior cerebral arteries, anterior communicating   artery and middle cerebral arteries are normal.      The normal intracranial venous circulation is identified. The superior   sagittal sinus, internal cerebral veins, vein of Bradley, straight sinus,   transverse sinuses, sigmoid sinuses and internal jugular veins are   normal. Cortical veins are normal. There is no evidence of aneurysm,   stenosis, or vessel occlusion.    Incidental note is made of marked calcification of the left stylohyoid   ligament.    IMPRESSION: No carotid or vertebral stenosis in the neck. Retropharyngeal   right internal carotid artery.    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 No   aneurysm.    --- Endof Report ---            ELVIA JOYCE MD; Attending Radiologist  This document has been electronically signed. Nov 17 2023  4:31PM    < end of copied text >  < from: CT Head No Cont (11.17.23 @ 11:17) >  ACC: [id number] EXAM:  CT CERVICAL SPINE   ORDERED BY: FERNANDEZ GRAVES     ACC: [id number] EXAM:  CT BRAIN   ORDERED BY: FERNANDEZ GRAVES     PROCEDURE DATE:  11/17/2023          INTERPRETATION:  CLINICAL INFORMATION: fall.    TECHNIQUE: CT head: Sequential axial images were obtained from the vertex   to the skull base without intravenous contrast. Coronal and sagittal   reformations were obtained.  CT cervical spine: Noncontrast CT scan of the cervical spine was   performed. Thin section axial imageswith sagittal and coronal   reformations were obtained.    COMPARISON: None.    FINDINGS:    CT head:    Some images are degraded by motion.    There is no acute intracranial hemorrhage or mass effect. There are areas   of hypodensity in the bilateral hemispheric white matter suggesting white   matter microvascular ischemic change. There is cerebral volume loss.   Bulbous appearance of the left supraclinoid ICA, raising suspicion for an   aneurysm. Recommend CTA head for further evaluation.    There is no extraaxial fluid collection.    There is no displaced calvarial fracture. The visualized orbits are   within normal limits. Right maxillary sinus polyp or retention cyst. The   mastoid air cells are well aerated.      CT cervical spine:    Anterolisthesis at C4-C5 and C7-T1. Straightening of the cervical   lordosis. Vertebral body heights are within normal limits. Multilevel   intervertebral disc height loss. Fusion of the C5-C6 and C7 vertebral   bodies. Multilevel disc osteophyte complexes and bilateral facet and   uncovertebral arthropathy, contributing to spinal canal and bilateral   neuroforaminal narrowing.    There is a 1.4 cm circumscribed ovoid lesion in the midline nasopharynx.   This could represent a Tornwaldt cyst or other lesion. Recommend   nonemergent direct visualization.    Elongated, calcified left stylohyoid ligament, which can be seen in Harlan   syndrome.      IMPRESSION:    CT head:  1.  No acute intracranial hemorrhage or mass effect.  2.  Bulbous appearance of the left supraclinoid ICA, raising suspicion   for an aneurysm. Recommend CTA head for further evaluation.    CT cervical spine:  1.  No evidence for acute displaced fracture or malalignment. Severe   chronic degenerative changes seen throughout the spine.  2.  There is a 1.4 cm circumscribed ovoid lesion in the midline   nasopharynx. This could represent a Tornwaldt cyst or other lesion.   Recommend nonemergent direct visualization.    Above findings discussed with, and acknowledged by  Dr. Fernandez Graves at   11/17/2023 12:13 PM  by Radiology resident Rigo Mcdonald DO.    --- End of Report ---          RIGO MCDONALD DO; Resident Radiologist  This document has been electronically signed.  DANNA NIELSON MD; Attending Radiologist  This document has been electronically signed. Nov 17 2023 12:15PM    < end of copied text >  < from: Xray Tibia + Fibula 2 Views, Left (11.17.23 @ 12:12) >    ACC: [id number] EXAM:  XR FOOT COMP MIN 3 VIEWS LT   ORDERED BY: FERNANDEZ GRAVES     ACC: [id number] EXAM:  XR TIB FIB AP LAT 2 VIEWS LT   ORDERED BY: FERNANDEZ GRAVES     ACC: [id number] EXAM:  XR KNEE AP LAT OBL 3 VIEWS LT   ORDERED BY:   FERNANDEZ GRAVES     ACC: [id number] EXAM:  XR ANKLE COMP MIN 3 VIEWS LT   ORDERED BY: FERNANDEZ GRAVES     PROCEDURE DATE:  11/17/2023          INTERPRETATION:  Left knee, tib-fib, ankle, and foot. Patient had a fall.    Left knee. 3 views.    No knee effusion. Somearterial calcification seen.    There is moderate degeneration most pronounced in the medial compartment   with loss of joint space. No fracture.    Left tib-fib. 2 views. 4 images.    Bones are intact.    Left ankle. 3 views.    There is swelling off the lateral malleolus and slight degeneration of   the malleolar tips.    There is an inferior calcaneal spur. Arterial calcification noted. No   fracture.    Left foot. 3 views.    There is mild degeneration at the first MTP joint. No bone destruction or   fracture.    IMPRESSION: No acute fracture. Swelling of the left lateral malleolus.   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    --- End of Report ---            JOSE ALVAREZ MD; Attending Radiologist  This document has been electronically signed. Nov 17 2023  7:14PM    < end of copied text >  
Patient is a 89 year old male from home, ambulates independently, with PMH of HTN, HLD, and BPH who presented to the ED after falling at home.  Patient states he was running to catch a mouse at home when he tripped at fell over.  Pateint states he was not dizzy prior to falling.  Patient denies head injury or LOC.  Patients nephew at bedside assisting with history.  Patient was recommended cane or walker due to dizzy episodes however patient previously refused.  Patient states he has episodes of dizziness when going from lying to sitting or sitting to standing.  Pateint states he did not have dizziness today and just lost balance while running after the mouse.  Patient now reports mild left knee pain.  Pateint states it is difficult for him to ambulate with is left knee.  Patient denies nausea, vomiting, headache, blurry vision, dizziness, chest pain, abdominal pain, constipation, diarrhea, leg swelling.     Lying in bed  Mild pain reported in Left leg, tibial aspect  No other sites  No NOEL  Lungs: Clear  Cor:RR  abdomen : Benign  No edema    Complete Blood Count + Automated Diff (11.17.23 @ 13:12)   WBC Count: 11.94 K/uL  RBC Count: 2.95 M/uL  Hemoglobin: 9.7 g/dL  Hematocrit: 30.3 %  Mean Cell Volume: 102.7 fl  Mean Cell Hemoglobin: 32.9 pg  Mean Cell Hemoglobin Conc: 32.0 gm/dL  Red Cell Distrib Width: 13.4 %  Platelet Count - Automated: 166 K/uL  Auto Neutrophil #: 10.98 K/uL  Auto Lymphocyte #: 0.36 K/uL  Auto Monocyte #: 0.60 K/uL  Auto Eosinophil #: 0.00 K/uL  Auto Basophil #: 0.00 K/uL  Auto Neutrophil %: 92.0: Differential percentages must be correlated with absolute numbers for   clinical significance. %    Basic Metabolic Panel (11.18.23 @ 06:39)   Sodium: 140 mmol/L  Potassium: 4.4 mmol/L  Chloride: 109 mmol/L  Carbon Dioxide: 25 mmol/L  Anion Gap: 6 mmol/L  Blood Urea Nitrogen: 29 mg/dL  Creatinine: 1.70 mg/dL  Glucose: 97 mg/dL  Calcium: 8.7 mg/dL  eGFR: 37: The estimated glomerular filtration rate (eGFR) is calculated using the   2021 CKD-EPI creatinine equation, which does not have a coefficient for   race and is validated in individuals 18 years of age and older (N Engl J   Med 2021; 385:6541-9532). Creatinine-based eGFR may be inaccurate in   various situations including but not limited to extremes of muscle mass,       
 89 year old male from home, ambulates independently, with PMH of HTN, HLD, and BPH who presented to the ED after falling at home.  Patient states he was running to catch a mouse at home when he tripped at fell over.  Pateint states he was not dizzy prior to falling.  Patient denies head injury or LOC.  Patients nephew at bedside assisting with history.  Patient was recommended cane or walker due to dizzy episodes however patient previously refused.  Patient states he has episodes of dizziness when going from lying to sitting or sitting to standing.  Pateint states he did not have dizziness today and just lost balance while running after the mouse.  Patient now reports mild left knee pain.  Pateint states it is difficult for him to ambulate with is left knee.  Patient denies nausea, vomiting, headache, blurry vision, dizziness, chest pain, abdominal pain, constipation, diarrhea, leg swelling.     89 y/o male seen and evaluated at bedside with nephew. Patient states that he slipped and fell 4 days ago when attempting to catch mouse at his home. Denied LOC or headstrike. Patient states that the left knee pain is well controlled with medication. Pt denies Fever, Chest pain, SOB, dyspnea, paresthesias, N/V/D, abdominal pain, syncope, or pain anywhere else.   PAST MEDICAL & SURGICAL HISTORY:  HTN (hypertension)      HLD (hyperlipidemia)      BPH (benign prostatic hyperplasia)        Home Medications:  losartan 100 mg oral tablet: 1 tab(s) orally once a day (17 Nov 2023 15:30)  Metoprolol Tartrate 100 mg oral tablet: 1 tab(s) orally 2 times a day (17 Nov 2023 15:30)  simvastatin 40 mg oral tablet: 1 tab(s) orally once a day (17 Nov 2023 15:30)  tamsulosin 0.4 mg oral capsule: 1 cap(s) orally once a day (17 Nov 2023 15:30)    Allergies    penicillin (Hives)    Intolerances                              8.3    5.71  )-----------( 144      ( 21 Nov 2023 07:48 )             25.4     11-21    140  |  110<H>  |  39<H>  ----------------------------<  101<H>  4.2   |  27  |  1.64<H>    Ca    8.1<L>      21 Nov 2023 07:48  Phos  2.7     11-21  Mg     1.6     11-21    TPro  5.4<L>  /  Alb  2.3<L>  /  TBili  0.5  /  DBili  x   /  AST  22  /  ALT  19  /  AlkPhos  41  11-21      Urinalysis Basic - ( 21 Nov 2023 07:48 )    Color: x / Appearance: x / SG: x / pH: x  Gluc: 101 mg/dL / Ketone: x  / Bili: x / Urobili: x   Blood: x / Protein: x / Nitrite: x   Leuk Esterase: x / RBC: x / WBC x   Sq Epi: x / Non Sq Epi: x / Bacteria: x          Vital Signs Last 24 Hrs  T(C): 36.2 (21 Nov 2023 10:37), Max: 37 (21 Nov 2023 04:47)  T(F): 97.2 (21 Nov 2023 10:37), Max: 98.6 (21 Nov 2023 04:47)  HR: 96 (21 Nov 2023 10:37) (94 - 105)  BP: 100/55 (21 Nov 2023 10:37) (100/55 - 137/69)  BP(mean): --  RR: 18 (21 Nov 2023 10:37) (18 - 18)  SpO2: 94% (21 Nov 2023 10:37) (94% - 96%)    Parameters below as of 21 Nov 2023 10:37  Patient On (Oxygen Delivery Method): room air        PHYSICAL EXAM  General: NAD, Awake and Alert    LEFT Lower Extremity:   Skin intact  No Ecchymosis of the proximal noted  + Swelling of the proximal fibula noted  No Gross deformity of the proximal fibula noted  NTTP over the proximal fibula  NTTP over the bony prominences of the hip/ankle/foot/toes  L2-S1 SILT  +EHL/FHL/TA/GSC  +DP pulses  Calf nontender  Compartments soft and compressible      .secondary      IMAGING:  < from: CT Lower Extremity No Cont, Left (11.20.23 @ 16:11) >  IMPRESSION: Pelvis: No acute fracture or dislocation.    Left knee: Acute minimally displaced obliquely oriented fracture of the   fibular head.    --- End of Report ---    < end of copied text >      Assessment/Plan:  89y Male with LEFT proximal fibula fracture   - ACE wrap and Knee immobilizer placed.  -Pain control as needed  -Daily PT: NWB LEFT lower extremity in knee immobilizer in wheelchair  - Incentive Spirometry  - Continue with care plan as per medicine team.  -Pt educated on signs/symptoms of compartment syndrome, instructed to advise nurse/doctor if experiencing s/s, pt exhibited full understanding  - Discussed with Dr. Escobar   - Orthopedically stable for discharge: Patient to follow up with Dr. Escobar in one week in office at [phone].

## 2023-11-21 NOTE — DISCHARGE NOTE PROVIDER - NSDCMRMEDTOKEN_GEN_ALL_CORE_FT
losartan 100 mg oral tablet: 1 tab(s) orally once a day  Metoprolol Tartrate 100 mg oral tablet: 1 tab(s) orally 2 times a day  simvastatin 40 mg oral tablet: 1 tab(s) orally once a day  tamsulosin 0.4 mg oral capsule: 1 cap(s) orally once a day   acetaminophen 325 mg oral tablet: 2 tab(s) orally every 6 hours As needed Temp greater or equal to 38C (100.4F), Mild Pain (1 - 3)  ascorbic acid 500 mg oral tablet: 1 tab(s) orally once a day  aspirin 81 mg oral tablet, chewable: 1 tab(s) orally once a day  digoxin 125 mcg (0.125 mg) oral tablet: 1 tab(s) orally every other day  metoprolol succinate 25 mg oral tablet, extended release: 1 tab(s) orally once a day  simvastatin 40 mg oral tablet: 1 tab(s) orally once a day  tamsulosin 0.4 mg oral capsule: 1 cap(s) orally once a day

## 2023-11-21 NOTE — DISCHARGE NOTE NURSING/CASE MANAGEMENT/SOCIAL WORK - NSDCFUADDAPPT_GEN_ALL_CORE_FT
Orthopedically stable for discharge: Patient to follow up with Dr. Escobar in one week in office at [phone].      Orthopedically stable for discharge: Patient to follow up with Dr. Escobar in one week in office at [phone].   A post hospital discharge appt has been made with Dr. Noriega on November 27, 2023 at 12:30 pm at [address] [address]

## 2023-11-21 NOTE — DISCHARGE NOTE NURSING/CASE MANAGEMENT/SOCIAL WORK - PATIENT PORTAL LINK FT
You can access the FollowMyHealth Patient Portal offered by [facility] by registering at the following website: [email or web address]. By joining [facility]’s FollowMyHealth portal, you will also be able to view your health information using other applications (apps) compatible with our system.

## 2023-11-21 NOTE — DISCHARGE NOTE PROVIDER - PROVIDER TOKENS
PROVIDER:[TOKEN:[[id number]]] PROVIDER:[TOKEN:[[id number]]],PROVIDER:[TOKEN:[[id number]],SCHEDULEDAPPT:[12/12/2023],SCHEDULEDAPPTTIME:[11:30 AM]]

## 2023-11-21 NOTE — DISCHARGE NOTE PROVIDER - CARE PROVIDER_API CALL
Ej Escobar  Orthopaedic Surgery  [address] [place] [address]  Phone: [phone]  Fax: [phone]  Follow Up Time:    Ej Escobar  Orthopaedic Surgery  [facility] [place] [address]  Phone: [phone]  Fax: [phone]  Follow Up Time:     Regan Swain  Medical Oncology  [address] [place] [address], [address]  [place] [address]  Phone: [phone]  Fax: [phone]  Scheduled Appointment: 12/12/2023 11:30 AM

## 2023-11-21 NOTE — PROGRESS NOTE ADULT - PROBLEM SELECTOR PLAN 1
no hx of afib presenting on metoprolol at home s/p mechanical fall now with new paroxsymal Afib on telemetry  - CHADSVASC=3  - A1c wnl  - Dr. Valencia consulted, pt placed on diagoxin + metoprolol  - c/w digoxin 125mcg every-other-day   - Lopressor transitioned to once a day dosing > Toprol 25mg   - c/w telemetry  - f/u echo  - Full dose ac not being given given pts fall risk  - Cardiology following, Dr. Valencia no hx of afib presenting on metoprolol at home s/p mechanical fall now with new paroxsymal Afib on telemetry. Stable with rate control at this time.   - CHADSVASC=3  - A1c wnl  - Dr. Valencia consulted, pt placed on diagoxin + metoprolol  - c/w digoxin 125mcg every-other-day   - Lopressor transitioned to once a day dosing > Toprol 25mg   - c/w telemetry  - f/u echo  - Full dose ac not being given given pts fall risk  - Cardiology following, Dr. Valencia no hx of afib presenting on metoprolol at home s/p mechanical fall now with new paroxsymal Afib on telemetry. Stable with rate control at this time.   - CHADSVASC=3  - A1c wnl  - Echo: Normal EF, Unable to fully assess diastolic function  - Dr. Valencia consulted, pt placed on digoxin + metoprolol  - c/w digoxin 125mcg every-other-day   - Lopressor transitioned to once a day dosing > Toprol 25mg   - c/w telemetry  - Full dose ac not being given given pts fall risk  - Cardiology following, Dr. Valencia

## 2023-11-21 NOTE — PROGRESS NOTE ADULT - PROBLEM SELECTOR PLAN 9
- as above (in [facility])
Xeljanz Counseling: I discussed with the patient the risks of Xeljanz therapy including increased risk of infection, liver issues, headache, diarrhea, or cold symptoms. Live vaccines should be avoided. They were instructed to call if they have any problems.

## 2023-11-21 NOTE — PROGRESS NOTE ADULT - SUBJECTIVE AND OBJECTIVE BOX
Patient is a 89y old  Male who presents with a chief complaint of Mechanical fall      SUBJECTIVE / OVERNIGHT EVENTS:      MEDICATIONS  (STANDING):  ascorbic acid 500 milliGRAM(s) Oral daily  aspirin  chewable 81 milliGRAM(s) Oral daily  atorvastatin 40 milliGRAM(s) Oral at bedtime  digoxin     Tablet 125 MICROGram(s) Oral every other day  heparin   Injectable 5000 Unit(s) SubCutaneous every 8 hours  influenza  Vaccine (HIGH DOSE) 0.7 milliLiter(s) IntraMuscular once  iron sucrose IVPB 200 milliGRAM(s) IV Intermittent every 24 hours  lactated ringers. 1000 milliLiter(s) (70 mL/Hr) IV Continuous <Continuous>  metoprolol succinate ER 25 milliGRAM(s) Oral daily  polyethylene glycol 3350 17 Gram(s) Oral at bedtime  tamsulosin 0.4 milliGRAM(s) Oral at bedtime    MEDICATIONS  (PRN):  acetaminophen     Tablet .. 650 milliGRAM(s) Oral every 6 hours PRN Temp greater or equal to 38C (100.4F), Mild Pain (1 - 3)    CAPILLARY BLOOD GLUCOSE        I&O's Summary    20 Nov 2023 07:01  -  21 Nov 2023 07:00  --------------------------------------------------------  IN: 950 mL / OUT: 500 mL / NET: 450 mL        PHYSICAL EXAM:  Vital Signs Last 24 Hrs  T(C): 36.2 (21 Nov 2023 10:37), Max: 37 (21 Nov 2023 04:47)  T(F): 97.2 (21 Nov 2023 10:37), Max: 98.6 (21 Nov 2023 04:47)  HR: 96 (21 Nov 2023 10:37) (94 - 105)  BP: 100/55 (21 Nov 2023 10:37) (100/55 - 137/69)  BP(mean): --  RR: 18 (21 Nov 2023 10:37) (18 - 18)  SpO2: 94% (21 Nov 2023 10:37) (94% - 96%)    Parameters below as of 21 Nov 2023 10:37  Patient On (Oxygen Delivery Method): room air        LABS:                        8.3    5.71  )-----------( 144      ( 21 Nov 2023 07:48 )             25.4     11-21    140  |  110<H>  |  39<H>  ----------------------------<  101<H>  4.2   |  27  |  1.64<H>    Ca    8.1<L>      21 Nov 2023 07:48  Phos  2.7     11-21  Mg     1.6     11-21    TPro  5.4<L>  /  Alb  2.3<L>  /  TBili  0.5  /  DBili  x   /  AST  22  /  ALT  19  /  AlkPhos  41  11-21          Urinalysis Basic - ( 21 Nov 2023 07:48 )    Color: x / Appearance: x / SG: x / pH: x  Gluc: 101 mg/dL / Ketone: x  / Bili: x / Urobili: x   Blood: x / Protein: x / Nitrite: x   Leuk Esterase: x / RBC: x / WBC x   Sq Epi: x / Non Sq Epi: x / Bacteria: x            RADIOLOGY & ADDITIONAL TESTS:         Patient is a 89y old  Male who presents with a chief complaint of Mechanical fall      SUBJECTIVE / OVERNIGHT EVENTS: events noted. No new complaints. CT showed fibular Fx       MEDICATIONS  (STANDING):  ascorbic acid 500 milliGRAM(s) Oral daily  aspirin  chewable 81 milliGRAM(s) Oral daily  atorvastatin 40 milliGRAM(s) Oral at bedtime  digoxin     Tablet 125 MICROGram(s) Oral every other day  heparin   Injectable 5000 Unit(s) SubCutaneous every 8 hours  influenza  Vaccine (HIGH DOSE) 0.7 milliLiter(s) IntraMuscular once  iron sucrose IVPB 200 milliGRAM(s) IV Intermittent every 24 hours  lactated ringers. 1000 milliLiter(s) (70 mL/Hr) IV Continuous <Continuous>  metoprolol succinate ER 25 milliGRAM(s) Oral daily  polyethylene glycol 3350 17 Gram(s) Oral at bedtime  tamsulosin 0.4 milliGRAM(s) Oral at bedtime    MEDICATIONS  (PRN):  acetaminophen     Tablet .. 650 milliGRAM(s) Oral every 6 hours PRN Temp greater or equal to 38C (100.4F), Mild Pain (1 - 3)    CAPILLARY BLOOD GLUCOSE        I&O's Summary    20 Nov 2023 07:01  -  21 Nov 2023 07:00  --------------------------------------------------------  IN: 950 mL / OUT: 500 mL / NET: 450 mL        PHYSICAL EXAM:  Vital Signs Last 24 Hrs  T(C): 36.2 (21 Nov 2023 10:37), Max: 37 (21 Nov 2023 04:47)  T(F): 97.2 (21 Nov 2023 10:37), Max: 98.6 (21 Nov 2023 04:47)  HR: 96 (21 Nov 2023 10:37) (94 - 105)  BP: 100/55 (21 Nov 2023 10:37) (100/55 - 137/69)  BP(mean): --  RR: 18 (21 Nov 2023 10:37) (18 - 18)  SpO2: 94% (21 Nov 2023 10:37) (94% - 96%)    Parameters below as of 21 Nov 2023 10:37  Patient On (Oxygen Delivery Method): room air    GEN: NAD; A and O x 3, lying in bed  LUNGS: CTA B/L  HEART: S1 S2  ABDOMEN: soft, non-tender, non-distended, + BS  EXTREMITIES: left knee immobilizer in place, no edema    LABS:                        8.3    5.71  )-----------( 144      ( 21 Nov 2023 07:48 )             25.4     11-21    140  |  110<H>  |  39<H>  ----------------------------<  101<H>  4.2   |  27  |  1.64<H>    Ca    8.1<L>      21 Nov 2023 07:48  Phos  2.7     11-21  Mg     1.6     11-21    TPro  5.4<L>  /  Alb  2.3<L>  /  TBili  0.5  /  DBili  x   /  AST  22  /  ALT  19  /  AlkPhos  41  11-21          Urinalysis Basic - ( 21 Nov 2023 07:48 )    Color: x / Appearance: x / SG: x / pH: x  Gluc: 101 mg/dL / Ketone: x  / Bili: x / Urobili: x   Blood: x / Protein: x / Nitrite: x   Leuk Esterase: x / RBC: x / WBC x   Sq Epi: x / Non Sq Epi: x / Bacteria: x            RADIOLOGY & ADDITIONAL TESTS:    < from: CT Lower Extremity No Cont, Left (11.20.23 @ 16:11) >    ACC: [id number] EXAM:  CT LWR EXT LT   ORDERED BY: EDUAR KNOTT     ACC: [id number] EXAM:  CT 3D RECONSTRUCT WO [facility]   ORDERED BY: EDUAR KNOTT     ACC: [id number] EXAM:  CT PELVIS BONY ONLY   ORDERED BY: EDUAR KNOTT     PROCEDURE DATE:  11/20/2023          INTERPRETATION:  EXAMINATION: CT of the pelvis and left lower extremity   without contrast    CLINICAL INFORMATION: Left leg pain    TECHNIQUE: Axial CT images were obtained through the pelvis and left   knee. Coronal and sagittal reformatted images were made. 3-D   reconstruction images were also performed.    FINDINGS: Pelvis: No acute fracture or dislocation is demonstrated. There   are no advanced arthritic changes at the hips. There is lower lumbar   spondylosis. There is grade1 anterolisthesis at L5-S1 with chronic   bilateral L5 pars defects. There are degenerative changes at the   sacroiliac joints and pubic symphysis. The bones are diffusely   demineralized, limiting evaluation for acute nondisplaced fractures.   Thereare bladder diverticula. There are vascular calcifications.    Left knee: There is an acute minimally displaced obliquely oriented   fracture of the fibular head. 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 There   is chondrocalcinosis throughout the joint. There are vascular   calcifications. There is anterior subcutaneous soft tissue edema. There   is moderate to severe atrophy of the medial gastrocnemius muscle. There   are vascular calcifications.    The 3-D reconstruction images confirm the above osseous findings.    IMPRESSION: Pelvis: No acute fracture or dislocation.    Left knee: Acute minimally displaced obliquely oriented fracture of the   fibular head.    --- End of Report ---    < end of copied text >

## 2023-11-21 NOTE — PROGRESS NOTE ADULT - SUBJECTIVE AND OBJECTIVE BOX
Date of Service 11-21-23 @ 11:25    CHIEF COMPLAINT:Patient is a 89y old  Male who presents with a chief complaint of Mechanical fall .Pt appears comfortable.    	  REVIEW OF SYSTEMS:  CONSTITUTIONAL: No fever, weight loss, or fatigue  EYES: No eye pain, visual disturbances, or discharge  ENT:  No difficulty hearing, tinnitus, vertigo; No sinus or throat pain  NECK: No pain or stiffness  RESPIRATORY: No cough, wheezing, chills or hemoptysis; No Shortness of Breath  CARDIOVASCULAR: No chest pain, palpitations, passing out, dizziness, or leg swelling  GASTROINTESTINAL: No abdominal or epigastric pain. No nausea, vomiting, or hematemesis; No diarrhea or constipation. No melena or hematochezia.  GENITOURINARY: No dysuria, frequency, hematuria, or incontinence  NEUROLOGICAL: No headaches, memory loss, loss of strength, numbness, or tremors  SKIN: No itching, burning, rashes, or lesions   LYMPH Nodes: No enlarged glands  ENDOCRINE: No heat or cold intolerance; No hair loss  MUSCULOSKELETAL: No joint pain or swelling; No muscle, back, or extremity pain  PSYCHIATRIC: No depression, anxiety, mood swings, or difficulty sleeping  HEME/LYMPH: No easy bruising, or bleeding gums  ALLERGY AND IMMUNOLOGIC: No hives or eczema	        PHYSICAL EXAM:  T(C): 36.2 (11-21-23 @ 10:37), Max: 37 (11-21-23 @ 04:47)  HR: 96 (11-21-23 @ 10:37) (94 - 105)  BP: 100/55 (11-21-23 @ 10:37) (100/55 - 137/69)  RR: 18 (11-21-23 @ 10:37) (18 - 18)  SpO2: 94% (11-21-23 @ 10:37) (94% - 96%)  Wt(kg): --  I&O's Summary    20 Nov 2023 07:01  -  21 Nov 2023 07:00  --------------------------------------------------------  IN: 950 mL / OUT: 500 mL / NET: 450 mL        Appearance: Normal	  HEENT:   Normal oral mucosa, PERRL, EOMI	  Lymphatic: No lymphadenopathy  Cardiovascular: Normal S1 S2, No JVD, No murmurs, No edema  Respiratory: Lungs clear to auscultation	  Psychiatry: A & O x 3, Mood & affect appropriate  Gastrointestinal:  Soft, Non-tender, + BS	  Skin: No rashes, No ecchymoses, No cyanosis	  Neurologic: Non-focal  Extremities: Normal range of motion, No clubbing, cyanosis or edema  Vascular: Peripheral pulses palpable 2+ bilaterally    MEDICATIONS  (STANDING):  ascorbic acid 500 milliGRAM(s) Oral daily  aspirin  chewable 81 milliGRAM(s) Oral daily  atorvastatin 40 milliGRAM(s) Oral at bedtime  digoxin     Tablet 125 MICROGram(s) Oral every other day  heparin   Injectable 5000 Unit(s) SubCutaneous every 8 hours  influenza  Vaccine (HIGH DOSE) 0.7 milliLiter(s) IntraMuscular once  iron sucrose IVPB 200 milliGRAM(s) IV Intermittent every 24 hours  lactated ringers. 1000 milliLiter(s) (70 mL/Hr) IV Continuous <Continuous>  metoprolol succinate ER 25 milliGRAM(s) Oral daily  polyethylene glycol 3350 17 Gram(s) Oral at bedtime  tamsulosin 0.4 milliGRAM(s) Oral at bedtime      LABS:	 	                      8.3    5.71  )-----------( 144      ( 21 Nov 2023 07:48 )             25.4     11-21    140  |  110<H>  |  39<H>  ----------------------------<  101<H>  4.2   |  27  |  1.64<H>    Ca    8.1<L>      21 Nov 2023 07:48  Phos  2.7     11-21  Mg     1.6     11-21    TPro  5.4<L>  /  Alb  2.3<L>  /  TBili  0.5  /  DBili  x   /  AST  22  /  ALT  19  /  AlkPhos  41  11-21    TSH: Thyroid Stimulating Hormone, Serum: 0.62 uU/mL (11-19 @ 06:29)      	      < from: CT Pelvis Bony Only No Cont (11.20.23 @ 16:11) >  ACC: [id number] EXAM:  CT LWR EXT LT   ORDERED BY: EDUAR KNOTT     ACC: [id number] EXAM:  CT 3D RECONSTRUCT WO [facility]   ORDERED BY: EDUAR JORDANAN     ACC: [id number] EXAM:  CT PELVIS BONY ONLY   ORDERED BY: EDUAR KNOTT     PROCEDURE DATE:  11/20/2023          INTERPRETATION:  EXAMINATION: CT of the pelvis and left lower extremity   without contrast    CLINICAL INFORMATION: Left leg pain    TECHNIQUE: Axial CT images were obtained through the pelvis and left   knee. Coronal and sagittal reformatted images were made. 3-D   reconstruction images were also performed.    FINDINGS: Pelvis: No acute fracture or dislocation is demonstrated. There   are no advanced arthritic changes at the hips. There is lower lumbar   spondylosis. There is grade1 anterolisthesis at L5-S1 with chronic   bilateral L5 pars defects. There are degenerative changes at the   sacroiliac joints and pubic symphysis. The bones are diffusely   demineralized, limiting evaluation for acute nondisplaced fractures.   Thereare bladder diverticula. There are vascular calcifications.    Left knee: There is an acute minimally displaced obliquely oriented   fracture of the fibular head. 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 There   is chondrocalcinosis throughout the joint. There are vascular   calcifications. There is anterior subcutaneous soft tissue edema. There   is moderate to severe atrophy of the medial gastrocnemius muscle. There   are vascular calcifications.    The 3-D reconstruction images confirm the above osseous findings.    IMPRESSION: Pelvis: No acute fracture or dislocation.    Left knee: Acute minimally displaced obliquely oriented fracture of the   fibular head.    --- End of Report ---            MAXIMILIANO MAGALLANES MD; Attending Radiologist  This document has been electronically signed. Nov 20 2023  4:26PM    < end of copied text >

## 2023-11-21 NOTE — PROGRESS NOTE ADULT - PROBLEM SELECTOR PLAN 5
Patient with history of HTN on home med Lopressor 100mg BID, Losartan 100mg at home  - Lopressor transitioned to once a day dosing > Toprol 25mg   - Holding off on other meds for now, while adjust as indicated Patient with history of HTN on home med Lopressor 100mg BID, Losartan 100mg at home  - BPs stable  - Lopressor transitioned to once a day dosing > Toprol 25mg   - Holding off on other meds for now, while adjust as indicated

## 2023-11-21 NOTE — DISCHARGE NOTE PROVIDER - HOSPITAL COURSE
89 year old male from home, ambulates independently, with PMH of HTN, HLD, and BPH presented to the ED after falling at home. CT Head in ED was negative for acute ischemia or hemorrhage, however labwork was significant for VAMSI and anemia, and EKG revealed 1st degree AV block. Patient was then admitted to tele which also revealed new onset Afib. Patient denied history of afib or dizziness/sob/palpitations etc, and states the fall was accidental in nature has he was attempting to step on a mouse. Cardiology was consulted and recommended managing with rate control without anticoagulation due to fall risk, CHADSVASC score = 3. Rate control obtained with digoxin 125mcg every other day and toperol 25mg daily. Patient tolerated this regimen well.     Otherwise, the pts hospital stay was complicated by a macrocytic anemia and complaints of leg pain with limited weight bearing on his left side. For his anemia, Heme/Onc was consulted. B12/folate levels were WNL. Per heme/onc recs, CT A/P was done which found XXXX. Pending multiple myeloma workup due to stable but persistent VAMSI. No baseline value available, as there is a possibility of CKD.     For his leg pain and weight bearing limitations, CT scan showed an acute minimally displaced obliquely oriented fracture of the fibular head. Ortho consulted and placed an ACE wrap and knee immobilizer to left knee, and recommends NWB PT with knee immobilzer in wheelchair with 1 week follow up with Dr. Escobar in office. Pain is well managed with acetaminophen PRN. PT recommends sub acute rehab for the patient, authorization completed for [facility]. Given patient's improved clinical status and current hemodynamic stability, the patient was approved for discharged. Discussed with attending.    89 year old male from home, ambulates independently, with PMH of HTN, HLD, and BPH presented to the ED after falling at home. CT Head in ED was negative for acute ischemia or hemorrhage, however labwork was significant for VAMSI and anemia, and EKG revealed 1st degree AV block. Patient was then admitted to tele which also revealed new onset Afib. Patient denied history of afib or dizziness/sob/palpitations etc, and states the fall was accidental in nature has he was attempting to step on a mouse. Cardiology was consulted and recommended managing with rate control without anticoagulation due to fall risk, CHADSVASC score = 3. Rate control obtained with digoxin 125mcg every other day and toprol  XL 25mg daily. Patient tolerated this regimen well. Otherwise, the pts hospital stay was complicated by a macrocytic anemia and complaints of leg pain with limited weight bearing on his left side. For his anemia, Heme/Onc was consulted. B12/folate levels were WNL. Per heme/onc recs, CT A/P was done and was found to be unremarkable. Per their instructions, pt has a follow up appointment with Dr. Swain (12/12 at 11:30am). For his leg pain and weight bearing limitations, CT scan showed an acute minimally displaced obliquely oriented fracture of the fibular head. Ortho was consulted and placed an ACE wrap and knee immobilizer to left knee, and recommended NWB PT with knee immobilzer. Per their instructions, pt should remain with knee immobilizer and in wheelchair until his 1 week follow up appointment with Dr. Escobar in office. Pain is well managed with acetaminophen PRN. PT recommends sub acute rehab for the patient, authorization completed for [facility]. Given patient's improved clinical status and current hemodynamic stability, the patient was approved for discharged. Discussed with attending.    89 year old male from home, ambulates independently, with PMH of HTN, HLD, and BPH presented to the ED after falling at home. CT Head in ED was negative for acute ischemia or hemorrhage, however labwork was significant for VAMSI and anemia, and EKG revealed 1st degree AV block. Patient was then admitted to tele which also revealed new onset Afib. Patient denied history of afib or dizziness/sob/palpitations etc, and states the fall was accidental in nature has he was attempting to step on a mouse. Cardiology was consulted and recommended managing with rate control without anticoagulation due to fall risk, CHADSVASC score = 3. Rate control obtained with digoxin 125mcg every other day and toprol  XL 25mg daily. Patient tolerated this regimen well. Otherwise, the pts hospital stay was complicated by a macrocytic anemia and complaints of leg pain with limited weight bearing on his left side. For his anemia, Heme/Onc was consulted. B12/folate levels were WNL. Per heme/onc recs, CT A/P was done and was found to be unremarkable. Per their instructions, pt has a follow up appointment with Dr. Swain (12/12 at 11:30am). For his leg pain and weight bearing limitations, CT scan showed an acute minimally displaced obliquely oriented fracture of the fibular head. Ortho was consulted and placed an ACE wrap and knee immobilizer to left knee, and recommended NWB PT with knee immobilzer. Per their instructions, pt should remain with knee immobilizer and in wheelchair until his 1 week follow up appointment with Dr. Escobar in office. Pain is well managed with acetaminophen PRN. PT recommends sub acute rehab for the patient, authorization completed for [facility]. Given patient's improved clinical status and current hemodynamic stability, the patient was approved for discharged. Discussed with attending and rest of team.

## 2023-11-21 NOTE — CHART NOTE - NSCHARTNOTEFT_GEN_A_CORE
Listed PMD Dr. Stephanie Syed has retired.  Mr. Boone was last assessed by Dr. Syed on 9/30/23    The new provider- Dr. Rigoberto Tran is located in the same office/contact number of [phone]- I was able to discuss the case with the representative of Dr. Noriega's office- Mr. Boone had a hgb of 5.6 on March 2023 and 5.8 on Dec 2022. His last creatinine was measured at 1.77 on July 2023. Patient has been referred to nephrologist previously but has declined to be seen by them.  A post hospital discharge appt has been made with Dr. Noriega on November 27, 2023 at 12:30 pm at [address] [place] [address]    Dr. Calderon

## 2023-11-21 NOTE — PROGRESS NOTE ADULT - ASSESSMENT
complete note to follow    #VTE Prophylaxis     Assessment and Recommendation:   · Assessment	    #Macrocytic Anemia  p/w Hgb=9.7 GFV=460 and today 7.9  pt states he had a few days with black stool recently when he was taking an antibiotic  renal insufficiency, Cr today 1.75  TSH/B12/folate levels are nl  iron studies show elevated ferritin (poss d/t acute inflamm) with low TIBC and trans sat 4%, c/w ACD with iron def  C-spine CT showed 1.4cm ovoid lesion in naopharynx cyst vs other  Rec's:  -CT C/A/P r/o occult malignancy  -given macrocytosis, Flow Cyt pending  -H/H stable  -PRBC transfusion if Hgb <7.0 or symptomatic  -Daily CBC  -GI consult for anemia, low trans sat, recent melena  -MM (-)  -outpt ENT   upon d/c pt to f/u with Dr. Swain 12/12 at 11:30am    #Leg weakness  further imaging----> CT shows fibular Fx  ortho consult appreciated, conservative mgmt, pt placed in knee immobilizer  mgmt as per Medicine Team    #VTE Prophylaxis    Thank you for the referral. Will continue to monitor the patient.  Please call with any questions [phone]  Above reviewed with Attending Dr. Wiliam JALLOH/[facility] Hem/Onc  [address] [facility], [address], [place], [place]  [phone]  *Note not finalized until signed by Attending Physician

## 2023-11-21 NOTE — DISCHARGE NOTE NURSING/CASE MANAGEMENT/SOCIAL WORK - NSDCPEFALRISK_GEN_ALL_CORE
For information on Fall & Injury Prevention, visit: [email or web address] OR  [email or web address] OR  https://www.cdc.gov/steadi/patient.html

## 2023-11-21 NOTE — PROGRESS NOTE ADULT - PROBLEM SELECTOR PLAN 3
Pt hx of BPH on flomax  - SCr of 2 on admission  - Unclear baseline  - denies history of kidney injury  - SCr has improved, but has remained elevated. Most recently 1.75  - Pt likely has undiagnosed CKD  - Post-renal component; bladder scan 11/19 showing 525 cc x1 straight cath  - F/U post-void bladder scan and Renal US  - F/U BMP daily   - Heme/Onc consulted [facility] for concern for myeloma  - f/u SPEP/UPEP, kappa/lambda ratio, UA with urine lytes Pt hx of BPH on flomax  - SCr of 2 on admission  - Unclear baseline  - denies history of kidney injury  - SCr has improved, but has remained elevated. Most recently 1.64  - Pt likely has undiagnosed CKD  - Post-renal component; bladder scan 11/19 showing 525 cc x1 straight cath  - F/U post-void bladder scan and Renal US  - F/U BMP daily   - Heme/Onc consulted [facility] for concern for myeloma  - f/u SPEP/UPEP, kappa/lambda ratio, UA with urine lytes

## 2023-11-21 NOTE — PROGRESS NOTE ADULT - ASSESSMENT
89 year old male from home, ambulates independently, with PMH of HTN, HLD, and BPH presented to the ED after falling at home.  Patient with CT Head in ED without acute ischemia or hemorrage, however labwork was significant for VAMSI and anemia, and EKG revealed 1st degree AV block. Patient admitted to tele which also revealed afib. Pt remaining on telemetry for further medical optimization.  89 year old male from home, ambulates independently, with PMH of HTN, HLD, and BPH presented to the ED after falling at home. Patient with CT Head in ED without acute ischemia or hemorrhage however lab work was significant for VAMSI and anemia, and EKG revealed 1st degree AV block. Patient admitted to tele which also revealed afib. Pt remaining on telemetry for further medical optimization. Due to LLE weakness and knee pain CT lower extremity obtained, this showed a small, non-displaced oblique fracture of the fibular head.     Case management following and options have been discussed for subacute rehab, authorization complete for [facility]. Bed is available, pre-admission COVID testing is negative.  89 year old male from home, ambulates independently, with PMH of HTN, HLD, and BPH presented to the ED after falling at home. Patient with CT Head in ED without acute ischemia or hemorrhage however lab work was significant for VAMSI and anemia, and EKG revealed 1st degree AV block. Patient admitted to tele which also revealed afib. Pt remaining on telemetry for further medical optimization.     Case management following and options have been discussed for subacute rehab, authorization complete for [facility]. Bed is available, pre-admission COVID testing is negative.

## 2023-11-21 NOTE — DISCHARGE NOTE PROVIDER - NSDCCPCAREPLAN_GEN_ALL_CORE_FT
PRINCIPAL DISCHARGE DIAGNOSIS  Diagnosis: Atrial fibrillation, new onset  Assessment and Plan of Treatment: While in the hospital, you were monitored on telemetry which revealed that you had developed atrial fibrillation, an arythmia which can be potentially dangerous. You wer started on medications to try and control your heart rate. PLEASE CONTINUE TAKING DIGOXIN 125MCG EVERY OTHER DAY, METOPROLOL 25MG ONCE EVERY DAY. Please make sure to see your primary care doctor at your scheduled appointment on 11/27 to ensure this issue remains well controlled.      SECONDARY DISCHARGE DIAGNOSES  Diagnosis: Left fibular fracture  Assessment and Plan of Treatment:     Diagnosis: VAMSI (acute kidney injury)  Assessment and Plan of Treatment:     Diagnosis: Macrocytic anemia  Assessment and Plan of Treatment:     Diagnosis: Hyperlipidemia  Assessment and Plan of Treatment:     Diagnosis: Hypertension  Assessment and Plan of Treatment:     Diagnosis: History of BPH  Assessment and Plan of Treatment:      PRINCIPAL DISCHARGE DIAGNOSIS  Diagnosis: Atrial fibrillation, new onset  Assessment and Plan of Treatment: While in the hospital, you were monitored on telemetry which revealed that you had developed atrial fibrillation, an arythmia which can be potentially dangerous. You wer started on medications to try and control your heart rate. PLEASE CONTINUE TAKING DIGOXIN 125MCG EVERY OTHER DAY, METOPROLOL 25MG ONCE EVERY DAY. Please make sure to see your primary care doctor at your scheduled appointment on 11/27 to ensure this issue remains well controlled.      SECONDARY DISCHARGE DIAGNOSES  Diagnosis: Left fibular fracture  Assessment and Plan of Treatment: While in the hospital, you were complaining of left leg pain and had weight bearing limitations on that side. A CT scan showed an acute fracture of your fibula. Orthopedic surgery was consulted and placed an ACE wrap and knee immobilizer to left knee, and recommends that you undergo more physical therapy without weight bearing pn that side. They also recommended that you place your leg in a knee immobilzer and use a wheelchair until you are able to follow up with an orthopedic surgeon, (Dr. Escobar ) within 1 week of your discharge. Additionally, as per PT recommendations, you are being discharged to sub acute rehab at [facility]. Please also continue taking tylenol as needed for pain related to your fracture.      Diagnosis: Acute kidney injury superimposed on CKD  Assessment and Plan of Treatment: Although you have not been previously been diagnosed with CKD, your labwork during your hospital stay revealed that you likely have CKD in addition to having experienced and acute kidney injury. You were given fluids and taken off some of your home medications which can be harmful to kidneys. You improved with this treatment plan and shown to be stable an a kidney function in line with CKD stage 3b. Please make sure to see your primary care doctor within 1-2 weeks of your of discharge to ensure this issue remains well controlled.    Diagnosis: Macrocytic anemia  Assessment and Plan of Treatment: While you were in the hospital, labwork revealed that you had anemia (low levels of blood cells). While this does not appear to be an acute issue, we conulted hematology specialists and started you on oral iron and vitamin C supplements. You also underwent a CT scan to assess for signs of bleeding which was found to be XXXX. Please make sure to see your primary care doctor within 1-2 weeks of your of discharge to ensure this issue remains well controlled.    Diagnosis: Hyperlipidemia  Assessment and Plan of Treatment: You have previously been diagnosed with high. While in the hospital, we continued you on a similar medication to your home medication which adequately managed this condition. Please make sure to continue taking your home medication (SIMVASTATIN 40mg) after you are discharged and to see your primary care doctor within 1-2 weeks of discharge to ensure this issue remains well controlled.    Diagnosis: Hypertension  Assessment and Plan of Treatment: You have previously been diagnosed with hypertension (high blood pressure). We managed your blood pressure during your hospitalization and the cardiologist made some changes to your home medications. After your discharge, PLEASE CONTINUE TAKING METOPROLOL XL 25MG. Please make sure to also see your primary care doctor and cardiologist within a week of discharge to ensure this issue remains well controlled.    Diagnosis: History of BPH  Assessment and Plan of Treatment: You have previously been diagnosed with BPH. While in the hospital, we continued you on your home medication which adequately managed this condition. Please make sure to see your primary care doctor within 1-2 weeks of discharge to ensure this issue remains well controlled.     PRINCIPAL DISCHARGE DIAGNOSIS  Diagnosis: Atrial fibrillation, new onset  Assessment and Plan of Treatment: While in the hospital, you were monitored on telemetry which revealed that you had developed atrial fibrillation, an arythmia which can be potentially dangerous. You wer started on medications to try and control your heart rate. PLEASE CONTINUE TAKING DIGOXIN 125MCG EVERY OTHER DAY, METOPROLOL 25MG ONCE EVERY DAY. Please make sure to see your primary care doctor at your scheduled appointment on 11/27 to ensure this issue remains well controlled.      SECONDARY DISCHARGE DIAGNOSES  Diagnosis: Left fibular fracture  Assessment and Plan of Treatment: While in the hospital, you were complaining of left leg pain and had weight bearing limitations on that side. A CT scan showed an acute fracture of your fibula. Orthopedic surgery was consulted and placed an ACE wrap and knee immobilizer to left knee, and recommends that you undergo more physical therapy without weight bearing pn that side. They also recommended that you place your leg in a knee immobilzer and use a wheelchair until you are able to follow up with an orthopedic surgeon, (Dr. Escobar ) within 1 week of your discharge. Additionally, as per PT recommendations, you are being discharged to sub acute rehab at [facility]. Please also continue taking tylenol as needed for pain related to your fracture.      Diagnosis: Acute kidney injury superimposed on CKD  Assessment and Plan of Treatment: Although you have not been previously been diagnosed with CKD, your labwork during your hospital stay revealed that you likely have CKD in addition to having experienced and acute kidney injury. You were given fluids and taken off some of your home medications which can be harmful to kidneys. You improved with this treatment plan and shown to be stable an a kidney function in line with CKD stage 3b. Please make sure to see your primary care doctor within 1-2 weeks of your of discharge to ensure this issue remains well controlled.    Diagnosis: Macrocytic anemia  Assessment and Plan of Treatment: While you were in the hospital, labwork revealed that you had anemia (low levels of blood cells). While this does not appear to be an acute issue, we conulted hematology specialists and started you on oral iron and vitamin C supplements. You also underwent a CT scan to assess for signs of bleeding which was found to be unremarkable. Please make sure to go to your appointment with hematologist (Dr. Swain) on 12/12 at 11:30am (information included in this discharge paperwork). Please make sure to also see your primary care doctor within 1-2 weeks of your of discharge to ensure this issue remains well controlled.    Diagnosis: Hyperlipidemia  Assessment and Plan of Treatment: You have previously been diagnosed with high. While in the hospital, we continued you on a similar medication to your home medication which adequately managed this condition. Please make sure to continue taking your home medication (SIMVASTATIN 40mg) after you are discharged and to see your primary care doctor within 1-2 weeks of discharge to ensure this issue remains well controlled.    Diagnosis: Hypertension  Assessment and Plan of Treatment: You have previously been diagnosed with hypertension (high blood pressure). We managed your blood pressure during your hospitalization and the cardiologist made some changes to your home medications. After your discharge, PLEASE CONTINUE TAKING METOPROLOL XL 25MG. Please make sure to also see your primary care doctor and cardiologist within a week of discharge to ensure this issue remains well controlled.    Diagnosis: History of BPH  Assessment and Plan of Treatment: You have previously been diagnosed with BPH. While in the hospital, we continued you on your home medication which adequately managed this condition. Please make sure to see your primary care doctor within 1-2 weeks of discharge to ensure this issue remains well controlled.

## 2023-11-22 NOTE — PROGRESS NOTE ADULT - ASSESSMENT
89 year old male from home, ambulates independently, with PMH of HTN, HLD, and BPH who presented to the ED after falling at home,VAMSI on CRI?,anemia and now new onset afib,Anemia.  1.Ortho eval noted.  2.Afib-fall/bleed risk-no AC asa,lopressor 12.5mg q8,dig .125mg qod.  3.VAMSI on CRI-IVF(s/p IV contrast).Urinary retention-s/p straight cath.  4.Left fibular head fx.  5.Anemia- Iron.  6.HTN-b blocker.  7.Lipid d/o-statin.  8.GI and DVT prophylaxis.

## 2023-11-22 NOTE — PROGRESS NOTE ADULT - SUBJECTIVE AND OBJECTIVE BOX
Patient is a 89y old  Male who presents with a chief complaint of Mechanical fall      SUBJECTIVE / OVERNIGHT EVENTS: events noted. No new complaints. CT showed fibular Fx , comfortable in bed      MEDICATIONS  (STANDING):  ascorbic acid 500 milliGRAM(s) Oral daily  aspirin  chewable 81 milliGRAM(s) Oral daily  atorvastatin 40 milliGRAM(s) Oral at bedtime  digoxin     Tablet 125 MICROGram(s) Oral every other day  heparin   Injectable 5000 Unit(s) SubCutaneous every 8 hours  influenza  Vaccine (HIGH DOSE) 0.7 milliLiter(s) IntraMuscular once  iron sucrose IVPB 200 milliGRAM(s) IV Intermittent every 24 hours  lactated ringers. 1000 milliLiter(s) (70 mL/Hr) IV Continuous <Continuous>  metoprolol succinate ER 25 milliGRAM(s) Oral daily  polyethylene glycol 3350 17 Gram(s) Oral at bedtime  tamsulosin 0.4 milliGRAM(s) Oral at bedtime    MEDICATIONS  (PRN):  acetaminophen     Tablet .. 650 milliGRAM(s) Oral every 6 hours PRN Temp greater or equal to 38C (100.4F), Mild Pain (1 - 3)    CAPILLARY BLOOD GLUCOSE        I&O's Summary    20 Nov 2023 07:01  -  21 Nov 2023 07:00  --------------------------------------------------------  IN: 950 mL / OUT: 500 mL / NET: 450 mL        PHYSICAL EXAM:      Vital Signs Last 24 Hrs  T(C): 37.1 (22 Nov 2023 11:02), Max: 37.1 (22 Nov 2023 11:02)  T(F): 98.8 (22 Nov 2023 11:02), Max: 98.8 (22 Nov 2023 11:02)  HR: 112 (22 Nov 2023 12:46) (80 - 112)  BP: 134/91 (22 Nov 2023 12:46) (117/69 - 145/69)  BP(mean): --  RR: 18 (22 Nov 2023 11:02) (17 - 18)  SpO2: 95% (22 Nov 2023 12:46) (92% - 95%)    Parameters below as of 22 Nov 2023 12:46  Patient On (Oxygen Delivery Method): room air        GEN: NAD; A and O x 3, lying in bed  LUNGS: CTA B/L  HEART: S1 S2  ABDOMEN: soft, non-tender, non-distended, + BS  EXTREMITIES: left knee immobilizer in place, no edema    LABS:                                       8.3    5.71  )-----------( 144      ( 21 Nov 2023 07:48 )             25.4         11-22    141  |  110<H>  |  32<H>  ----------------------------<  100<H>  4.1   |  26  |  1.37<H>    Ca    8.5      22 Nov 2023 06:30  Phos  2.4     11-22  Mg     1.8     11-22    TPro  5.6<L>  /  Alb  2.4<L>  /  TBili  0.6  /  DBili  x   /  AST  21  /  ALT  22  /  AlkPhos  43  11-22            Urinalysis Basic - ( 21 Nov 2023 07:48 )    Color: x / Appearance: x / SG: x / pH: x  Gluc: 101 mg/dL / Ketone: x  / Bili: x / Urobili: x   Blood: x / Protein: x / Nitrite: x   Leuk Esterase: x / RBC: x / WBC x   Sq Epi: x / Non Sq Epi: x / Bacteria: x            RADIOLOGY & ADDITIONAL TESTS:    < from: CT Lower Extremity No Cont, Left (11.20.23 @ 16:11) >    ACC: [id number] EXAM:  CT LWR EXT LT   ORDERED BY: EDUAR KNOTT     ACC: [id number] EXAM:  CT 3D RECONSTRUCT WO [facility]   ORDERED BY: EDUAR KNOTT     ACC: [id number] EXAM:  CT PELVIS BONY ONLY   ORDERED BY: EDUAR KNOTT     PROCEDURE DATE:  11/20/2023          INTERPRETATION:  EXAMINATION: CT of the pelvis and left lower extremity   without contrast    CLINICAL INFORMATION: Left leg pain    TECHNIQUE: Axial CT images were obtained through the pelvis and left   knee. Coronal and sagittal reformatted images were made. 3-D   reconstruction images were also performed.    FINDINGS: Pelvis: No acute fracture or dislocation is demonstrated. There   are no advanced arthritic changes at the hips. There is lower lumbar   spondylosis. There is grade1 anterolisthesis at L5-S1 with chronic   bilateral L5 pars defects. There are degenerative changes at the   sacroiliac joints and pubic symphysis. The bones are diffusely   demineralized, limiting evaluation for acute nondisplaced fractures.   Thereare bladder diverticula. There are vascular calcifications.    Left knee: There is an acute minimally displaced obliquely oriented   fracture of the fibular head. 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 There   is chondrocalcinosis throughout the joint. There are vascular   calcifications. There is anterior subcutaneous soft tissue edema. There   is moderate to severe atrophy of the medial gastrocnemius muscle. There   are vascular calcifications.    The 3-D reconstruction images confirm the above osseous findings.    IMPRESSION: Pelvis: No acute fracture or dislocation.    Left knee: Acute minimally displaced obliquely oriented fracture of the   fibular head.    --- End of Report ---    < end of copied text >        < from: CT Abdomen and Pelvis No Cont (11.21.23 @ 15:47) >    ACC: [id number] EXAM:  CT ABDOMEN AND PELVIS   ORDERED BY: EDUAR KNOTT     PROCEDURE DATE:  11/21/2023          INTERPRETATION:  CLINICAL INFORMATION: 89 years  Male with Pt w anemia a   hx of melena. Assess for GI bleed.    COMPARISON: None.    CONTRAST/COMPLICATIONS:  IV Contrast: NONE  Oral Contrast: NONE  Complications: None reported at time of study completion    PROCEDURE:  CT of the Abdomen and Pelvis was performed.  Sagittal and coronal reformats were performed.    LIMITATIONS: Evaluation of the solid organs, vascular structures and GI   tract is limited without oral and IV contrast. Evaluation for GI bleed is   limited as well.    FINDINGS:  LOWER CHEST: Mild bibasilar dependent atelectasis. Trace bilateral   pleural effusions. Coronary and aortic atherosclerosis.    LIVER: Hyperdense parenchyma.  BILE DUCTS: Normal caliber.  GALLBLADDER: Within normal limits.  SPLEEN: Within normal limits.  PANCREAS: Mild atrophy.  ADRENALS: Within normal limits.  KIDNEYS/URETERS: Right perinephric edema. No hydronephrosis. 1 cm right   lower pole indeterminate hypodensity. Completely atrophic left kidney   without hydronephrosis. Peripheral parenchymal chronic dystrophic   calcifications.    BLADDER: Distended measuring 13.1 cm sagittal.  REPRODUCTIVE ORGANS: Prostate within normal limits.    BOWEL: No bowel obstruction. Appendix is normal. Mild sigmoid   diverticulosis without diverticulitis.  PERITONEUM: No ascites.  VESSELS: Atherosclerotic changes. Possible stenotic right proximalrenal   artery.  RETROPERITONEUM/LYMPH NODES: No lymphadenopathy.  ABDOMINAL WALL: Within normal limits.  BONES: Degenerative changes. Grade 1 L5-S1 anterolisthesis.    IMPRESSION:  Hyperdense liver parenchyma. Possible iron deposition disease.    Nonspecific right perinephric edema without hydronephrosis. Consider   pyelonephritis.    Mild sigmoid diverticulosis without diverticulitis.    Possible right proximal renal artery stenosis.        --- End of Report ---            CLAUDIA CATHERINE MD; Attending Radiologist  This document has been electronically signed. Nov 21 2023  3:58PM    < end of copied text >

## 2023-11-22 NOTE — PROGRESS NOTE ADULT - ASSESSMENT
Assessment and Recommendation:   · Assessment	    #Macrocytic Anemia  p/w Hgb=9.7 IJP=062 and today 8.3  pt states he had a few days with black stool recently when he was taking an antibiotic  renal insufficiency, Cr today 1.37  TSH/B12/folate levels are nl  iron studies show elevated ferritin (poss d/t acute inflamm) with low TIBC and trans sat 4%, c/w ACD with iron def  C-spine CT showed 1.4cm ovoid lesion in naopharynx cyst vs other  s/p -CT C/A/P negative  for  occult malignancy  -given macrocytosis, Flow Cyt pending  -H/H stable  -PRBC transfusion if Hgb <7.0 or symptomatic  -Daily CBC  -GI consult for anemia, low trans sat, recent melena  -MM (-)  -outpt ENT   upon d/c pt to f/u with Dr. Swain 12/12 at 11:30am    #Leg weakness  further imaging----> CT shows fibular Fx  ortho consult appreciated, conservative mgmt, pt placed in knee immobilizer  mgmt as per Medicine Team    #VTE Prophylaxis    Thank you for the referral. Will continue to monitor the patient.  Please call with any questions [phone]

## 2023-11-22 NOTE — PROGRESS NOTE ADULT - REASON FOR ADMISSION
Mechanical fall

## 2023-11-22 NOTE — PROGRESS NOTE ADULT - PROVIDER SPECIALTY LIST ADULT
Cardiology
Heme/Onc
Cardiology
Cardiology
Heme/Onc
Cardiology
Heme/Onc
Internal Medicine

## 2023-11-22 NOTE — PROGRESS NOTE ADULT - SUBJECTIVE AND OBJECTIVE BOX
Date of Service 11-22-23 @ 10:57    CHIEF COMPLAINT:Patient is a 89y old  Male who presents with a chief complaint of Mechanical fall .Pt appears comfortable.    	  REVIEW OF SYSTEMS:  CONSTITUTIONAL: No fever, weight loss, or fatigue  EYES: No eye pain, visual disturbances, or discharge  ENT:  No difficulty hearing, tinnitus, vertigo; No sinus or throat pain  NECK: No pain or stiffness  RESPIRATORY: No cough, wheezing, chills or hemoptysis; No Shortness of Breath  CARDIOVASCULAR: No chest pain, palpitations, passing out, dizziness, or leg swelling  GASTROINTESTINAL: No abdominal or epigastric pain. No nausea, vomiting, or hematemesis; No diarrhea or constipation. No melena or hematochezia.  GENITOURINARY: No dysuria, frequency, hematuria, or incontinence  NEUROLOGICAL: No headaches, memory loss, loss of strength, numbness, or tremors  SKIN: No itching, burning, rashes, or lesions   LYMPH Nodes: No enlarged glands  ENDOCRINE: No heat or cold intolerance; No hair loss  MUSCULOSKELETAL: No joint pain or swelling; No muscle, back, or extremity pain  PSYCHIATRIC: No depression, anxiety, mood swings, or difficulty sleeping  HEME/LYMPH: No easy bruising, or bleeding gums  ALLERGY AND IMMUNOLOGIC: No hives or eczema	      PHYSICAL EXAM:  T(C): 36.8 (11-22-23 @ 07:15), Max: 37 (11-21-23 @ 23:56)  HR: 80 (11-22-23 @ 07:15) (80 - 100)  BP: 129/77 (11-22-23 @ 07:15) (117/69 - 145/69)  RR: 18 (11-22-23 @ 07:15) (17 - 18)  SpO2: 95% (11-22-23 @ 07:15) (93% - 97%)  Wt(kg): --  I&O's Summary    21 Nov 2023 07:01  -  22 Nov 2023 07:00  --------------------------------------------------------  IN: 0 mL / OUT: 300 mL / NET: -300 mL        Appearance: Normal	  HEENT:   Normal oral mucosa, PERRL, EOMI	  Lymphatic: No lymphadenopathy  Cardiovascular: Normal S1 S2, No JVD, No murmurs, No edema  Respiratory: Lungs clear to auscultation	  Psychiatry: A & O x 3, Mood & affect appropriate  Gastrointestinal:  Soft, Non-tender, + BS	  Skin: No rashes, No ecchymoses, No cyanosis	  Neurologic: Non-focal  Extremities: Normal range of motion, No clubbing, cyanosis or edema  Vascular: Peripheral pulses palpable 2+ bilaterally    MEDICATIONS  (STANDING):  ascorbic acid 500 milliGRAM(s) Oral daily  aspirin  chewable 81 milliGRAM(s) Oral daily  atorvastatin 40 milliGRAM(s) Oral at bedtime  digoxin     Tablet 125 MICROGram(s) Oral every other day  heparin   Injectable 5000 Unit(s) SubCutaneous every 8 hours  influenza  Vaccine (HIGH DOSE) 0.7 milliLiter(s) IntraMuscular once  lactated ringers. 1000 milliLiter(s) (70 mL/Hr) IV Continuous <Continuous>  metoprolol succinate ER 25 milliGRAM(s) Oral daily  polyethylene glycol 3350 17 Gram(s) Oral at bedtime  tamsulosin 0.4 milliGRAM(s) Oral at bedtime        	  LABS:	 	                        8.3    5.71  )-----------( 144      ( 21 Nov 2023 07:48 )             25.4     11-22    141  |  110<H>  |  32<H>  ----------------------------<  100<H>  4.1   |  26  |  1.37<H>    Ca    8.5      22 Nov 2023 06:30  Phos  2.4     11-22  Mg     1.8     11-22    TPro  5.6<L>  /  Alb  2.4<L>  /  TBili  0.6  /  DBili  x   /  AST  21  /  ALT  22  /  AlkPhos  43  11-22      TSH: Thyroid Stimulating Hormone, Serum: 0.62 uU/mL (11-19 @ 06:29)

## 2023-11-24 PROBLEM — E78.5 HYPERLIPIDEMIA, UNSPECIFIED: Chronic | Status: ACTIVE | Noted: 2023-01-01

## 2023-11-24 PROBLEM — N40.0 BENIGN PROSTATIC HYPERPLASIA WITHOUT LOWER URINARY TRACT SYMPTOMS: Chronic | Status: ACTIVE | Noted: 2023-01-01

## 2023-11-24 PROBLEM — I10 ESSENTIAL (PRIMARY) HYPERTENSION: Chronic | Status: ACTIVE | Noted: 2023-01-01

## 2023-11-24 NOTE — CONSULT NOTE ADULT - ASSESSMENT
89 year old male from home, who ambulates independently, with PMH of paroxysmal atrial fibrillation, HTN, HLD, CKD and BPH who was sent by the rehab center for altered mental status and fall in the nursing home.  1.Tele monitoring.  2.AMS-Neurology eval.  3.Ammonia-p.  4.PAF-asa,dig qod,no ac due to fall bleed risk.  5.VAMSI on CRI with known urinary retention-Renal eval IVF, may need nevarez.  6.Lipid d/o-statin.  7.GI and DVT prophylaxis. 89 year old male from home, who ambulates independently, with PMH of paroxysmal atrial fibrillation, HTN, HLD, CKD and BPH who was sent by the rehab center for altered mental status and fall in the nursing home.  1.Tele monitoring.  2.AMS-Neurology eval.  3.Ammonia-p.  4.PAF-asa,dig qod,no ac due to fall bleed risk.Cont lopressor,dig qod.  5.VAMSI on CRI with known urinary retention-Renal eval IVF, may need nevarez.  6.Lipid d/o-statin.  7.GI and DVT prophylaxis.

## 2023-11-24 NOTE — ED PROVIDER NOTE - CLINICAL SUMMARY MEDICAL DECISION MAKING FREE TEXT BOX
89M presenting with altered mental status. reported fall last night and possible UTI. will get labs, CT head to assess.     Elsa Ribera [phone]

## 2023-11-24 NOTE — ED PROVIDER NOTE - WET READ LAUNCH FT
There are no Wet Read(s) to document. Continue Regimen: Aczone 7.5% gel daily\\nEpiduo Forte HS Detail Level: Zone

## 2023-11-24 NOTE — ED ADULT NURSE REASSESSMENT NOTE - NS ED NURSE REASSESS COMMENT FT1
Pt is s/p straight catheterization. 300 mL output of clear yellow urine noted. Pt tolerated procedure well. MD King made aware.
Pt failed dysphagia screening. MD Carole cid.

## 2023-11-24 NOTE — H&P ADULT - PROBLEM SELECTOR PLAN 5
C/w metoprolol. Found to have new onset atrial fibrillation during last admission.   Discharged on digoxin, metoprolol.   Hold digoxin until digoxin level result (due to VAMSI).   C/w metoprolol.   Hold AC due to frequent falls.  COnsulted cardio - Dr. Valencia.

## 2023-11-24 NOTE — H&P ADULT - ASSESSMENT
Patient is a 89 year old male from home, who ambulates independently, with PMH of paroxysmal atrial fibrillation, HTN, HLD, CKD and BPH who was sent by the rehab center for altered mental status and fall in the nursing home. Patient is being admitted for further management of acute metabolic encephalopathy, recurrent fall and VAMSI.
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
## 2023-11-24 NOTE — ED ADULT NURSE NOTE - NSFALLHARMRISKINTERV_ED_ALL_ED
Assistance OOB with selected safe patient handling equipment if applicable/Assistance with ambulation/Communicate risk of Fall with Harm to all staff, patient, and family/Monitor gait and stability/Monitor for mental status changes and reorient to person, place, and time, as needed/Provide visual cue: red socks, yellow wristband, yellow gown, etc/Reinforce activity limits and safety measures with patient and family/Toileting schedule using arm’s reach rule for commode and bathroom/Use of alarms - bed, stretcher, chair and/or video monitoring/Bed in lowest position, wheels locked, appropriate side rails in place/Call bell, personal items and telephone in reach/Instruct patient to call for assistance before getting out of bed/chair/stretcher/Non-slip footwear applied when patient is off stretcher/[place] to call system/Physically safe environment - no spills, clutter or unnecessary equipment/Purposeful Proactive Rounding/Room/bathroom lighting operational, light cord in reach

## 2023-11-24 NOTE — CONSULT NOTE ADULT - SUBJECTIVE AND OBJECTIVE BOX
Patient is a 89y old  Male who presents with a chief complaint of LOC (24 Nov 2023 16:03)      HPI:    PAST MEDICAL & SURGICAL HISTORY:  HTN (hypertension)      HLD (hyperlipidemia)      BPH (benign prostatic hyperplasia)      Paroxysmal atrial fibrillation      Chronic kidney disease (CKD)          FAMILY HISTORY:        Social Hx:  Nonsmoker, no drug or alcohol use    MEDICATIONS  (STANDING):  ascorbic acid 500 milliGRAM(s) Oral daily  aspirin enteric coated 81 milliGRAM(s) Oral daily  atorvastatin 40 milliGRAM(s) Oral at bedtime  heparin   Injectable 5000 Unit(s) SubCutaneous every 12 hours  lactated ringers. 1000 milliLiter(s) (75 mL/Hr) IV Continuous <Continuous>  metoprolol tartrate 12.5 milliGRAM(s) Oral every 12 hours  tamsulosin 0.4 milliGRAM(s) Oral at bedtime       Allergies    penicillin (Hives)    Intolerances        ROS: Pertinent positives in HPI, all other ROS were reviewed and are negative.      Vital Signs Last 24 Hrs  T(C): 36.5 (24 Nov 2023 21:31), Max: 37.3 (24 Nov 2023 12:00)  T(F): 97.7 (24 Nov 2023 21:31), Max: 99.1 (24 Nov 2023 12:00)  HR: 94 (24 Nov 2023 21:31) (92 - 102)  BP: 140/75 (24 Nov 2023 21:31) (106/79 - 140/75)  BP(mean): --  RR: 19 (24 Nov 2023 21:31) (18 - 19)  SpO2: 98% (24 Nov 2023 21:31) (98% - 100%)    Parameters below as of 24 Nov 2023 21:31  Patient On (Oxygen Delivery Method): nasal cannula  O2 Flow (L/min): 4          Constitutional: awake and alert.  HEENT: PERRLA, EOMI,   Neck: Supple.  Respiratory: Breath sounds are clear bilaterally  Cardiovascular: S1 and S2, regular / irregular rhythm  Gastrointestinal: soft, nontender  Extremities:  no edema  Vascular: Caritid Bruit - no  Musculoskeletal: no joint swelling/tenderness, no abnormal movements  Skin: No rashes    Neurological exam:  HF: A x O x 3. Appropriately interactive, normal affect. Speech fluent, No Aphasia or paraphasic errors. Naming /repetition intact   CN: LEANN, EOMI, VFF, facial sensation normal, no NLFD, tongue midline, Palate moves equally, SCM equal bilaterally  Motor: No pronator drift, Strength 5/5 in all 4 ext, normal bulk and tone, no tremor, rigidity or bradykinesia.    Sens: Intact to light touch / PP/ VS/ JS    Reflexes: Symmetric and normal . BJ 2+, BR 2+, KJ 2+, AJ 2+, downgoing toes b/l  Coord:  No FNFA, dysmetria, VANDANA intact   Gait/Balance: Normal/Cannot test    NIHSS:          Labs:                        9.0    9.24  )-----------( 230      ( 24 Nov 2023 11:50 )             28.7     11-24    142  |  111<H>  |  43<H>  ----------------------------<  110<H>  4.6   |  24  |  3.03<H>    Ca    9.1      24 Nov 2023 20:29  Phos  3.7     11-24  Mg     1.8     11-24    TPro  6.1  /  Alb  2.7<L>  /  TBili  0.8  /  DBili  x   /  AST  33  /  ALT  32  /  AlkPhos  54  11-24        PT/INR - ( 24 Nov 2023 11:50 )   PT: 12.5 sec;   INR: 1.10 ratio         PTT - ( 24 Nov 2023 11:50 )  PTT:26.2 sec    Radiology report:  - CT head:    < from: CT Cervical Spine No Cont (11.24.23 @ 13:18) >  ACC: [id number] EXAM:  CT CERVICAL SPINE   ORDERED BY: FRANK MORROW     ACC: [id number] EXAM:  CT BRAIN   ORDERED BY: FRANK MORROW     PROCEDURE DATE:  11/24/2023          INTERPRETATION:  CT HEAD, CT CERVICAL SPINE    Clinical information: s/p fall    IMAGING TECHNIQUE:  Noncontrast CT.  Axial Acquisition.  Sagittal and coronal reformations.    COMPARISON:  Exams are compared to prior studies of 11/17/2023    HEAD CT FINDINGS:  HEMORRHAGE:  No evidence of intracranial hemorrhage.  BRAIN PARENCHYMA:  Mild to moderate atrophy. Mild periventricular white   matter ischemic changes. No mass or mass effect.  VENTRICLES / SHIFT:  No hydrocephalus. No midline shift.  EXTRA-AXIAL / BASAL CISTERNS:  No extra-axial mass. Basal cisterns   preserved.  Atherosclerotic calcifications of the cavernous internal   carotid arteries.  CALVARIUM AND EXTRACRANIAL SOFT TISSUES:  No depressed calvarial fracture.  SINUSES, ORBITS, MASTOIDS:  Moderate retention cyst right maxillary   sinus. Mild fluid left mastoid    CERVICAL SPINE FINDINGS:  FRACTURES:  No evidence of an acute cervical spine fracture.  ALIGNMENT:  Straightening of the normal cervical lordosis. Stable grade 1   anterolisthesis C7 on T1  SPINAL COLUMN:  Vertebral body heights are well-maintained. Fusion of the   C2-3, C5-6 and C6-7 disc spaces on a degenerative basis. Severe narrowing   of the C3-4 and C4-5 disc spaces with associated endplate degenerative   changes and osteophytes. Multilevel facet DJD.  OTHER: Marked calcification/ossification of the left stylohyoid ligament.   Atherosclerotic calcification of the left carotid bifurcation    IMPRESSION:  HEAD CT:  No evidence of an acute traumatic intracranial injury.  CERVICAL SPINE CT:  No evidence of an acute cervical spine fracture.    --- End of Report ---            ELSA CORTÉS MD; Attending Radiologist  This document has been electronically signed. Nov 24 2023  1:34PM    < end of copied text >   Patient is a 89y old  Male who presents with a chief complaint of LOC (24 Nov 2023 16:03)      HPI:    Patient is a 89 year old male from home, who ambulates independently, with PMH of paroxysmal atrial fibrillation, HTN, HLD, CKD and BPH who was sent by the rehab center for altered mental status and fall in the nursing home. Patient is evaluated at bedside and is awake (AAOx2 - baseline) who reports that he remembers falling yesterday after he was trying to get away from people at the rehab center. He reports he was trying to get away as he was being tied down to bed and there were a lot of people trying to hold him. Patient denies feeling  ..patient reported as confused and  trying to remove his knee immobilizer yesterday. This morning patient was noted to be with BP 75/47.'  Patient denies any recent fevers, chills, weakness, fatigue, malaise, headache, dizziness, lightheadedness, chest pain, palpitations, shortness of breath, cough, nausea, vomiting, abdominal pain, diarrhea, constipation, melena, hematochezia, dysuria, urinary frequency, or urgency. Patient denies any other complains at this time.  Of note: Patient was discharged 2 days ago from [facility]. Patient was admitted post fall and found to have fracture of left fibular head. Patient was also found to have new onset atrial fibrillation in last admission. PAST MEDICAL & SURGICAL HISTORY:  HTN (hypertension)  HLD (hyperlipidemia)  BPH (benign prostatic hyperplasia)  Paroxysmal atrial fibrillation  Chronic kidney disease (CKD)      FAMILY HISTORY:        Social Hx:  Nonsmoker, no drug or alcohol use    MEDICATIONS  (STANDING):  ascorbic acid 500 milliGRAM(s) Oral daily  aspirin enteric coated 81 milliGRAM(s) Oral daily  atorvastatin 40 milliGRAM(s) Oral at bedtime  heparin   Injectable 5000 Unit(s) SubCutaneous every 12 hours  lactated ringers. 1000 milliLiter(s) (75 mL/Hr) IV Continuous <Continuous>  metoprolol tartrate 12.5 milliGRAM(s) Oral every 12 hours  tamsulosin 0.4 milliGRAM(s) Oral at bedtime       Allergies    penicillin (Hives)    Intolerances        ROS: Pertinent positives in HPI, all other ROS were reviewed and are negative.      Vital Signs Last 24 Hrs  T(C): 36.5 (24 Nov 2023 21:31), Max: 37.3 (24 Nov 2023 12:00)  T(F): 97.7 (24 Nov 2023 21:31), Max: 99.1 (24 Nov 2023 12:00)  HR: 94 (24 Nov 2023 21:31) (92 - 102)  BP: 140/75 (24 Nov 2023 21:31) (106/79 - 140/75)  BP(mean): --  RR: 19 (24 Nov 2023 21:31) (18 - 19)  SpO2: 98% (24 Nov 2023 21:31) (98% - 100%)    Parameters below as of 24 Nov 2023 21:31  Patient On (Oxygen Delivery Method): nasal cannula  O2 Flow (L/min): 4          Constitutional: awake and alert.  HEENT: PERRLA, EOMI,   Neck: Supple.  Respiratory: Breath sounds are clear bilaterally  Cardiovascular: S1 and S2, regular rhythm  Gastrointestinal: soft, nontender  Extremities:  no edema  Vascular: Carotid Bruit - no  Musculoskeletal: no joint swelling/tenderness, no abnormal movements  Skin: No rashes    Neurological exam:  HF: A x O x 3. Appropriately interactive, normal affect. Speech fluent, No Aphasia or paraphasic errors. Naming /repetition intact   CN: LEANN, EOMI, VFF, facial sensation normal, no NLFD, tongue midline, Palate moves equally, SCM equal bilaterally  Motor:Right pronator drift, Strength 4/5 in all 4 ext, normal bulk and tone, no tremor, rigidity or bradykinesia.    Sens: Intact to light touch / PP/ VS/ JS    Reflexes: Symmetric and normal . BJ 2+, BR 2+, KJ 2+, AJ 2+, downgoing toes b/l  Coord:  R>L FNFA, dysmetria, VANDANA intact   Gait/Balance: Unable to walk    NIHSS:9  MRS 3  1A: Level of consciousness       0= Alert; keenly responsive  1B: Ask month and age       0= Both questions right  1C: "Blink eyes" and "Squeeze Hands"       0= Performs both  2: Horizontal EOMs       0= Normal  3: Visual fields       0= No visual loss  4: Facial palsy (use grimace if obtunded)       +1= Minor paralysis (flat NLF, smile asymmetry)  5A: Left arm motor drift (count out loud and use fingers to show count)      0= No drift x 10 seconds  5B: Right arm motor drift       +1= Drift but doesn't hit bed  6A: Left leg motor drift       +1= Drift but doesn't hit bed  6B: Right leg motor drift       +2= Drift, hits bed  7: Limb ataxia (FNF/heel-shin)       +2= Ataxia in 2 limbs  8: Sensation       0= Normal, no sensory loss    9: Language/aphasia- describe the scene (on sameer); name the items; read the sentences (on sameer)       +1= mild-moderate aphaisa: some obvious changes without significant limitation  10: Dysarthria- read the words       +1= mild-moderate dysarthria: slurring but can be understood      11: Extinction/inattention       0= No abnormality                Labs:                        9.0    9.24  )-----------( 230      ( 24 Nov 2023 11:50 )             28.7     11-24    142  |  111<H>  |  43<H>  ----------------------------<  110<H>  4.6   |  24  |  3.03<H>    Ca    9.1      24 Nov 2023 20:29  Phos  3.7     11-24  Mg     1.8     11-24    TPro  6.1  /  Alb  2.7<L>  /  TBili  0.8  /  DBili  x   /  AST  33  /  ALT  32  /  AlkPhos  54  11-24        PT/INR - ( 24 Nov 2023 11:50 )   PT: 12.5 sec;   INR: 1.10 ratio         PTT - ( 24 Nov 2023 11:50 )  PTT:26.2 sec    Radiology report:  - CT head:    < from: CT Cervical Spine No Cont (11.24.23 @ 13:18) >  ACC: [id number] EXAM:  CT CERVICAL SPINE   ORDERED BY: FRANK MORROW     ACC: [id number] EXAM:  CT BRAIN   ORDERED BY: FRANK MORROW     PROCEDURE DATE:  11/24/2023          INTERPRETATION:  CT HEAD, CT CERVICAL SPINE    Clinical information: s/p fall    IMAGING TECHNIQUE:  Noncontrast CT.  Axial Acquisition.  Sagittal and coronal reformations.    COMPARISON:  Exams are compared to prior studies of 11/17/2023    HEAD CT FINDINGS:  HEMORRHAGE:  No evidence of intracranial hemorrhage.  BRAIN PARENCHYMA:  Mild to moderate atrophy. Mild periventricular white   matter ischemic changes. No mass or mass effect.  VENTRICLES / SHIFT:  No hydrocephalus. No midline shift.  EXTRA-AXIAL / BASAL CISTERNS:  No extra-axial mass. Basal cisterns   preserved.  Atherosclerotic calcifications of the cavernous internal   carotid arteries.  CALVARIUM AND EXTRACRANIAL SOFT TISSUES:  No depressed calvarial fracture.  SINUSES, ORBITS, MASTOIDS:  Moderate retention cyst right maxillary   sinus. Mild fluid left mastoid    CERVICAL SPINE FINDINGS:  FRACTURES:  No evidence of an acute cervical spine fracture.  ALIGNMENT:  Straightening of the normal cervical lordosis. Stable grade 1   anterolisthesis C7 on T1  SPINAL COLUMN:  Vertebral body heights are well-maintained. Fusion of the   C2-3, C5-6 and C6-7 disc spaces on a degenerative basis. Severe narrowing   of the C3-4 and C4-5 disc spaces with associated endplate degenerative   changes and osteophytes. Multilevel facet DJD.  OTHER: Marked calcification/ossification of the left stylohyoid ligament.   Atherosclerotic calcification of the left carotid bifurcation    IMPRESSION:  HEAD CT:  No evidence of an acute traumatic intracranial injury.  CERVICAL SPINE CT:  No evidence of an acute cervical spine fracture.    --- End of Report ---            ELSA CORTÉS MD; Attending Radiologist  This document has been electronically signed. Nov 24 2023  1:34PM    < end of copied text >

## 2023-11-24 NOTE — ED ADULT NURSE NOTE - ED STAT RN HANDOFF DETAILS
Pt was endorsed to receiving nurse Carly and report on Pt was given at the bedside.  ZENAIDA Carroll was made aware that Pt failed dysphagia and MD Lam was also informed prior to Pt's arrival on the inpatient floor. MD Lam was made aware of pending labs and verbalized that the lab orders would be changed.  ZENAIDA Carroll was present at the time of MD's verbalization of orders. All care provided to patient,  patient was rounded on and safety measures were in place. Pt was endorsed to receiving nurse Carly and report on Pt was given at the bedside.  ZENAIDA Carroll was made aware that Pt failed dysphagia and MD Lam was also informed prior to Pt's arrival on the inpatient floor. MD Lam was made aware of pending labs and verbalized that the time for the lab orders would be changed.  ZENAIDA Carroll was present at the time of MD's verbalization of orders. All care was provided to patient, there are no stat or late  medications pending at the time of hand off.  The patient was rounded on and safety measures were in place while under care of ED Nurse and team. Pt was endorsed to receiving nurse Carly and report on Pt was given at the bedside.  ZENAIDA Carroll was made aware that Pt failed dysphagia and MD Lam was also informed prior to Pt's arrival on the inpatient floor. ED nurse spoke with MD Lam regarding stat lab orders. MD Lam verbalized that the time for the stat lab orders would be changed.  ZENAIAD Carroll was present at the time of MD's verbalization of orders. All care was provided to patient, there are no stat or late  medications pending at the time of hand off.  The patient was rounded on and safety measures were in place while under care of ED Nurse and team.

## 2023-11-24 NOTE — ED ADULT TRIAGE NOTE - CHIEF COMPLAINT QUOTE
BIBA  for  c/o altered  mental status  this  morning   as  per  EMS  nephew  came  to  visit  and  pt  is  more  confused .

## 2023-11-24 NOTE — CONSULT NOTE ADULT - ASSESSMENT
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 Delirium superimposed on underlying multi-infarct state.  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 Delirium superimposed on underlying multi-infarct state.  Discussed spinal tap for meningitis; he and his relative who was present agreed that his condition is not meningitis and that the combination of renal insufficience, pulmonary disorders including ELVIS, temporariry fixed by CPAP is the cause of his confusion

## 2023-11-24 NOTE — H&P ADULT - PROBLEM SELECTOR PLAN 2
P/w Cr of 2.81 (unknown baseline but discharged with Cr of 1.37)  Likely prerenal in setting of dehydration vs episode of hypotension.   Start LR @75ml/hr.   Avoid nephrotoxic agents.   Repeat BMP @8pm.

## 2023-11-24 NOTE — PHARMACOTHERAPY INTERVENTION NOTE - COMMENTS
Patient is from [facility] and their medical record was used to update the outpatient medication review.
Home

## 2023-11-24 NOTE — H&P ADULT - PROBLEM SELECTOR PLAN 1
- Sent from NH for acute agitation and fall.   - Likely episode of delirium  - Might be 2/2 to worsening dementia vs uremia vs other electrolyte abnormality vs infection vs urinary retention.   - On evaluation patient neurologic status same as before discharge but nephew at bedside reports this is not his baseline.   - HEAD CT:  No evidence of an acute traumatic intracranial injury.  - CERVICAL SPINE CT:  No evidence of an acute cervical spine fracture.  - F/U ammonia level.   - F/U bladder scan.   - F/U B12, folic acid level.  - Consulted neurology - Dr. Terrell.

## 2023-11-24 NOTE — ED ADULT NURSE NOTE - BEFAST ARM SIDE DRIFT
Bill 31050 For Specimen Handling/Conveyance To Laboratory?: no Render Post-Care Instructions In Note?: yes Path Notes (To The Dermatopathologist): Possible basal cell carcinoma, please evaluate Dressing: dry sterile dressing No Detail Level: Detailed Notification Instructions: Patient will be notified of biopsy results. However, patient instructed to call the office if not contacted within 1 week. Post-Care Instructions: I reviewed with the patient in detail post-care instructions. Patient is to keep the biopsy site dry overnight, and then apply bacitracin twice daily until healed. Patient may apply hydrogen peroxide soaks to remove any crusting. Bill As?: Note: Bill Malignant Destruction If Path Confirms Malignant Lesion. Only Bill As Shave Removal If Path Comes Back Benign. Do Not Bill Shave Removal On Malignant Lesions.: Malignant Destruction Size After Destruction (Required For Destruction Billing): 0.6 Consent: Written consent was obtained and risks were reviewed including but not limited to scarring, infection, bleeding, scabbing, incomplete removal, nerve damage and allergy to anesthesia. Wound Care: Polysporin ointment Billing Type: Third-Party Bill Hemostasis: Electrocautery Number Of Curettages: 3 Cautery Type: electrodesiccation Anesthesia Type: 1% Xylocaine with epinephrine

## 2023-11-24 NOTE — CONSULT NOTE ADULT - SUBJECTIVE AND OBJECTIVE BOX
Date of Service  11-24-23 @ 16:03    CHIEF COMPLAINT:Patient is a 89y old  Male who presents with a chief complaint of     HPI:      PAST MEDICAL & SURGICAL HISTORY:  HTN (hypertension)      HLD (hyperlipidemia)      BPH (benign prostatic hyperplasia)          MEDICATIONS  (STANDING):  ascorbic acid 500 milliGRAM(s) Oral daily  aspirin enteric coated 81 milliGRAM(s) Oral daily  atorvastatin 40 milliGRAM(s) Oral at bedtime  digoxin     Tablet 125 MICROGram(s) Oral every other day  metoprolol tartrate 12.5 milliGRAM(s) Oral every 12 hours  tamsulosin 0.4 milliGRAM(s) Oral at bedtime    MEDICATIONS  (PRN):  acetaminophen     Tablet .. 650 milliGRAM(s) Oral every 6 hours PRN Temp greater or equal to 38C (100.4F), Mild Pain (1 - 3)  melatonin 3 milliGRAM(s) Oral at bedtime PRN Insomnia  ondansetron Injectable 4 milliGRAM(s) IV Push every 8 hours PRN Nausea and/or Vomiting      FAMILY HISTORY:      SOCIAL HISTORY:    [ ] Non-smoker  [ ] Smoker  [ ] Alcohol    Allergies    penicillin (Hives)    Intolerances    	    REVIEW OF SYSTEMS:  CONSTITUTIONAL: No fever, weight loss, or fatigue  EYES: No eye pain, visual disturbances, or discharge  ENT:  No difficulty hearing, tinnitus, vertigo; No sinus or throat pain  NECK: No pain or stiffness  RESPIRATORY: No cough, wheezing, chills or hemoptysis; No Shortness of Breath  CARDIOVASCULAR: No chest pain, palpitations, passing out, dizziness, or leg swelling  GASTROINTESTINAL: No abdominal or epigastric pain. No nausea, vomiting, or hematemesis; No diarrhea or constipation. No melena or hematochezia.  GENITOURINARY: No dysuria, frequency, hematuria, or incontinence  NEUROLOGICAL: No headaches, memory loss, loss of strength, numbness, or tremors  SKIN: No itching, burning, rashes, or lesions   LYMPH Nodes: No enlarged glands  ENDOCRINE: No heat or cold intolerance; No hair loss  MUSCULOSKELETAL: No joint pain or swelling; No muscle, back, or extremity pain  PSYCHIATRIC: No depression, anxiety, mood swings, or difficulty sleeping  HEME/LYMPH: No easy bruising, or bleeding gums  ALLERGY AND IMMUNOLOGIC: No hives or eczema	    [ ] All others negative	  [ ] Unable to obtain    PHYSICAL EXAM:  T(C): 36.6 (11-24-23 @ 15:36), Max: 37.3 (11-24-23 @ 12:00)  HR: 92 (11-24-23 @ 15:36) (92 - 102)  BP: 106/79 (11-24-23 @ 15:36) (106/79 - 133/67)  RR: 19 (11-24-23 @ 15:36) (18 - 19)  SpO2: 99% (11-24-23 @ 15:36) (99% - 100%)  Wt(kg): --  I&O's Summary      Appearance: Normal	  HEENT:   Normal oral mucosa, PERRL, EOMI	  Lymphatic: No lymphadenopathy  Cardiovascular: Normal S1 S2, No JVD, No murmurs, No edema  Respiratory: Lungs clear to auscultation	  Psychiatry: A & O x 3, Mood & affect appropriate  Gastrointestinal:  Soft, Non-tender, + BS	  Skin: No rashes, No ecchymoses, No cyanosis	  Neurologic: Non-focal  Extremities: Normal range of motion, No clubbing, cyanosis or edema  Vascular: Peripheral pulses palpable 2+ bilaterally    TELEMETRY: 	    ECG:  	  RADIOLOGY:  OTHER: 	  	  LABS:	 	    CARDIAC MARKERS:  CARDIAC MARKERS ( 24 Nov 2023 11:50 )  x     / x     / 394 U/L / x     / x          Troponin I, High Sensitivity Result: 165.4 ng/L (11-24-23 @ 11:50)                          9.0    9.24  )-----------( 230      ( 24 Nov 2023 11:50 )             28.7     11-24    141  |  109<H>  |  42<H>  ----------------------------<  123<H>  4.8   |  23  |  2.81<H>    Ca    8.8      24 Nov 2023 11:50  Phos  3.7     11-24  Mg     1.8     11-24    TPro  6.1  /  Alb  2.7<L>  /  TBili  0.8  /  DBili  x   /  AST  33  /  ALT  32  /  AlkPhos  54  11-24    proBNP:   Lipid Profile:   HgA1c:   TSH:     PREVIOUS DIAGNOSTIC TESTING:    [ ] Echocardiogram:  [ ]  Catheterization:  [ ] Stress Test:         Date of Service  11-24-23 @ 16:03      CHIEF COMPLAINT:Patient is a 89y old  Male who presents with a chief complaint of AMS.      HPI:  Patient is a 89 year old male from home, who ambulates independently, with PMH of paroxysmal atrial fibrillation, HTN, HLD, CKD and BPH who was sent by the rehab center for altered mental status and fall in the nursing home. Patient is evaluated at bedside and is awake (AAOx2 - baseline) who reports that he remembers falling yesterday after he was trying to get away from people at the rehab center. He reports he was trying to get away as he was being tied down to bed and there were a lot of people trying to hold him. Patient denies feeling  ..patient reported as confused and  trying to remove his knee immobilizer yesterday. This morning patient was noted to be with BP 75/47.'  Patient denies any recent fevers, chills, weakness, fatigue, malaise, headache, dizziness, lightheadedness, chest pain, palpitations, shortness of breath, cough, nausea, vomiting, abdominal pain, diarrhea, constipation, melena, hematochezia, dysuria, urinary frequency, or urgency. Patient denies any other complains at this time.  Of note: Patient was discharged 2 days ago from [facility]. Patient was admitted post fall and found to have fracture of left fibular head. Patient was also found to have new onset atrial fibrillation in last admission.  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 1000 milliLiter(s) (75 mL/Hr) IV Continuous <Continuous>  lactulose Syrup 20 Gram(s) Oral three times a day  metoprolol tartrate 12.5 milliGRAM(s) Oral every 12 hours  tamsulosin 0.4 milliGRAM(s) Oral at bedtime    MEDICATIONS  (PRN):  acetaminophen     Tablet .. 650 milliGRAM(s) Oral every 6 hours PRN Temp greater or equal to 38C (100.4F), Mild Pain (1 - 3)  melatonin 3 milliGRAM(s) Oral at bedtime PRN Insomnia  OLANZapine Injectable 2.5 milliGRAM(s) IntraMuscular every 12 hours PRN agitation  ondansetron Injectable 4 milliGRAM(s) IV Push every 8 hours PRN Nausea and/or Vomiting      FAMILY HISTORY:unable to obtain      SOCIAL HISTORY:    unable to obatian    Allergies    penicillin (Hives)    Intolerances    	    REVIEW OF SYSTEMS:  CONSTITUTIONAL: No fever, weight loss, or fatigue  EYES: No eye pain, visual disturbances, or discharge  ENT:  No difficulty hearing, tinnitus, vertigo; No sinus or throat pain  NECK: No pain or stiffness  RESPIRATORY: No cough, wheezing, chills or hemoptysis; No Shortness of Breath  CARDIOVASCULAR: No chest pain, palpitations, passing out, dizziness, or leg swelling  GASTROINTESTINAL: No abdominal or epigastric pain. No nausea, vomiting, or hematemesis; No diarrhea or constipation. No melena or hematochezia.  GENITOURINARY: No dysuria, frequency, hematuria, or incontinence  NEUROLOGICAL: No headaches, memory loss, loss of strength, numbness, or tremors  SKIN: No itching, burning, rashes, or lesions   LYMPH Nodes: No enlarged glands  ENDOCRINE: No heat or cold intolerance; No hair loss  MUSCULOSKELETAL: No joint pain or swelling; No muscle, back, or extremity pain  PSYCHIATRIC: No depression, anxiety, mood swings, or difficulty sleeping  HEME/LYMPH: No easy bruising, or bleeding gums  ALLERGY AND IMMUNOLOGIC: No hives or eczema	      PHYSICAL EXAM:  T(C): 37.1 (11-25-23 @ 08:13), Max: 37.4 (11-25-23 @ 00:10)  HR: 102 (11-25-23 @ 08:13) (90 - 113)  BP: 109/92 (11-25-23 @ 08:13) (93/68 - 140/75)  RR: 18 (11-25-23 @ 08:13) (18 - 19)  SpO2: 94% (11-25-23 @ 08:13) (94% - 100%)  Wt(kg): --  I&O's Summary      Appearance: Normal	  HEENT:   Normal oral mucosa, PERRL, EOMI	  Lymphatic: No lymphadenopathy  Cardiovascular: Normal S1 S2, No JVD, No murmurs, No edema  Respiratory: Lungs clear to auscultation	  Psychiatry: A & O x 3, Mood & affect appropriate  Gastrointestinal:  Soft, Non-tender, + BS	  Skin: No rashes, No ecchymoses, No cyanosis	  Neurologic: Non-focal  Extremities: Normal range of motion, No clubbing, cyanosis or edema  Vascular: Peripheral pulses palpable 2+ bilaterally    EKG not in chart	     	  	  LABS:	 	    CARDIAC MARKERS:  CARDIAC MARKERS ( 25 Nov 2023 08:10 )  x     / x     / 448 U/L / x     / x      CARDIAC MARKERS ( 24 Nov 2023 11:50 )  x     / x     / 394 U/L / x     / x          Troponin I, High Sensitivity Result: 161.9 ng/L (11-24-23 @ 20:29)  Troponin I, High Sensitivity Result: 165.4 ng/L (11-24-23 @ 11:50)                          7.3    10.35 )-----------( 250      ( 25 Nov 2023 08:10 )             23.0     11-25    142  |  112<H>  |  54<H>  ----------------------------<  116<H>  4.4   |  24  |  3.27<H>    Ca    8.3<L>      25 Nov 2023 08:10  Phos  4.1     11-25  Mg     1.8     11-25    TPro  5.5<L>  /  Alb  2.5<L>  /  TBili  0.6  /  DBili  0.2  /  AST  30  /  ALT  29  /  AlkPhos  48  11-25         Date of Service  11-24-23 @ 16:03      CHIEF COMPLAINT:Patient is a 89y old  Male who presents with a chief complaint of AMS.      HPI:  Patient is a 89 year old male from home, who ambulates independently, with PMH of paroxysmal atrial fibrillation, HTN, HLD, CKD and BPH who was sent by the rehab center for altered mental status and fall in the nursing home. Patient is evaluated at bedside and is awake (AAOx2 - baseline) who reports that he remembers falling yesterday after he was trying to get away from people at the rehab center. He reports he was trying to get away as he was being tied down to bed and there were a lot of people trying to hold him. Patient denies feeling  ..patient reported as confused and  trying to remove his knee immobilizer yesterday. This morning patient was noted to be with BP 75/47.'  Patient denies any recent fevers, chills, weakness, fatigue, malaise, headache, dizziness, lightheadedness, chest pain, palpitations, shortness of breath, cough, nausea, vomiting, abdominal pain, diarrhea, constipation, melena, hematochezia, dysuria, urinary frequency, or urgency. Patient denies any other complains at this time.  Of note: Patient was discharged 2 days ago from [facility]. Patient was admitted post fall and found to have fracture of left fibular head. Patient was also found to have new onset atrial fibrillation in last admission.  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 1000 milliLiter(s) (75 mL/Hr) IV Continuous <Continuous>  lactulose Syrup 20 Gram(s) Oral three times a day  metoprolol tartrate 12.5 milliGRAM(s) Oral every 12 hours  tamsulosin 0.4 milliGRAM(s) Oral at bedtime    MEDICATIONS  (PRN):  acetaminophen     Tablet .. 650 milliGRAM(s) Oral every 6 hours PRN Temp greater or equal to 38C (100.4F), Mild Pain (1 - 3)  melatonin 3 milliGRAM(s) Oral at bedtime PRN Insomnia  OLANZapine Injectable 2.5 milliGRAM(s) IntraMuscular every 12 hours PRN agitation  ondansetron Injectable 4 milliGRAM(s) IV Push every 8 hours PRN Nausea and/or Vomiting      FAMILY HISTORY:unable to obtain      SOCIAL HISTORY:    unable to obatian    Allergies    penicillin (Hives)    Intolerances    	    REVIEW OF SYSTEMS:  unable to obtain    PHYSICAL EXAM:  T(C): 37.1 (11-25-23 @ 08:13), Max: 37.4 (11-25-23 @ 00:10)  HR: 102 (11-25-23 @ 08:13) (90 - 113)  BP: 109/92 (11-25-23 @ 08:13) (93/68 - 140/75)  RR: 18 (11-25-23 @ 08:13) (18 - 19)  SpO2: 94% (11-25-23 @ 08:13) (94% - 100%)  Wt(kg): --  I&O's Summary      Appearance: Normal	  HEENT:   Normal oral mucosa, PERRL, EOMI	  Lymphatic: No lymphadenopathy  Cardiovascular: Normal S1 S2, No JVD, No murmurs, No edema  Respiratory: Lungs clear to auscultation	  Gastrointestinal:  Soft, Non-tender, + BS	  Skin: No rashes, No ecchymoses, No cyanosis	  Neurologic: Non-focal  Extremities: Normal range of motion, No clubbing, cyanosis or edema  Vascular: Peripheral pulses palpable 2+ bilaterally    EKG not in chart	     	  	  LABS:	 	    CARDIAC MARKERS:  CARDIAC MARKERS ( 25 Nov 2023 08:10 )  x     / x     / 448 U/L / x     / x      CARDIAC MARKERS ( 24 Nov 2023 11:50 )  x     / x     / 394 U/L / x     / x          Troponin I, High Sensitivity Result: 161.9 ng/L (11-24-23 @ 20:29)  Troponin I, High Sensitivity Result: 165.4 ng/L (11-24-23 @ 11:50)                          7.3    10.35 )-----------( 250      ( 25 Nov 2023 08:10 )             23.0     11-25    142  |  112<H>  |  54<H>  ----------------------------<  116<H>  4.4   |  24  |  3.27<H>    Ca    8.3<L>      25 Nov 2023 08:10  Phos  4.1     11-25  Mg     1.8     11-25    TPro  5.5<L>  /  Alb  2.5<L>  /  TBili  0.6  /  DBili  0.2  /  AST  30  /  ALT  29  /  AlkPhos  48  11-25

## 2023-11-24 NOTE — H&P ADULT - PROBLEM SELECTOR PLAN 4
Found to have new onset atrial fibrillation during last admission.   Discharged on digoxin, metoprolol.   Hold digoxin until digoxin level result (due to VAMSI).   C/w metoprolol.   Hold AC due to frequent falls.  COnsulted cardio - Dr. Valencia. Patient presented with fall at rehab center.   Recent last admission for similar complain.   CT left LE - 11/20/23 - Left knee: Acute minimally displaced obliquely oriented fracture of the fibular head.  Patient discharged to rehab with NWB instructions.   PT consult.

## 2023-11-24 NOTE — ED PROVIDER NOTE - OBJECTIVE STATEMENT
89M, pmh of  PMH of HTN, HLD, AFIB, sent to the emergency department for altered mental status. patient  unable to provide reliable history, history provided by patient's nephew Bacilio over the phone. patient has no complaints and denies any pain. nephew reports over the past week the patient has appeared more confused. reports he was found down on the ground at the nursing home yesterday. was told the patient has a urine infection.

## 2023-11-24 NOTE — ED PROVIDER NOTE - CARE PLAN
1 Principal Discharge DX:	VAMSI (acute kidney injury)  Secondary Diagnosis:	Altered mental status  Secondary Diagnosis:	Non-ST elevation MI (NSTEMI)

## 2023-11-24 NOTE — ED PROVIDER NOTE - PHYSICAL EXAMINATION
General: well appearing male, no acute distress   HEENT: normocephalic, atraumatic   Respiratory: normal work of breathing   Abdomen: soft, non-tender, no guarding or rebound   MSK: no swelling or tenderness of lower extremities, moving all extremities spontaneously   Skin: warm, dry   Neuro: A&Ox1  Psych: appropriate affect

## 2023-11-24 NOTE — H&P ADULT - NSICDXPASTMEDICALHX_GEN_ALL_CORE_FT
PAST MEDICAL HISTORY:  BPH (benign prostatic hyperplasia)     Chronic kidney disease (CKD)     HLD (hyperlipidemia)     HTN (hypertension)     Paroxysmal atrial fibrillation

## 2023-11-24 NOTE — H&P ADULT - PROBLEM SELECTOR PLAN 3
Patient presented with fall at rehab center.   Recent last admission for similar complain.   CT left LE - 11/20/23 - Left knee: Acute minimally displaced obliquely oriented fracture of the fibular head.  Patient discharged to rehab with NWB instructions.   PT consult. P/w trop 164.   EKG - NSR w/ 1st degree AV block (unchanged from last time)  Admit to tele.   Repeat troponin.   Likely 2/2 to episode of hypotension vs fall.

## 2023-11-24 NOTE — H&P ADULT - NSHPPHYSICALEXAM_GEN_ALL_CORE
PHYSICAL EXAM:  GENERAL: NAD, frail looking, speaks in full sentences, no signs of respiratory distress, dry mucous membrance.   HEAD:  Atraumatic, Normocephalic  EYES: EOMI, PERRLA, conjunctiva and sclera clear  NECK: Supple  CHEST/LUNG: Clear to auscultation bilaterally; No wheeze; No crackles; No accessory muscles used  HEART: Regular rate and rhythm; No murmurs;   ABDOMEN: Soft, Nontender, Nondistended; Bowel sounds present; No guarding  EXTREMITIES:  2+ Peripheral Pulses, No edema, knee immobilizer in left leg.   PSYCH: AAOx2  NEUROLOGY: non-focal  SKIN: No rashes or lesions

## 2023-11-24 NOTE — H&P ADULT - ATTENDING COMMENTS
Patient seen/evaluated at bedside in the ED on 11/24/23.  Nephew at bedside.  I agree with the resident H&P  note/outlined plan of care. My independent findings and conclusions are documented.    89 yo Male with a PMHx of HTN, HLD, BPH, anemia, CKD stage 3 (creatinine 1.77 in outpt setting July 2023), recent [facility] hospitalization for fall w/out syncope complicated by a non-displaced left fibular fracture (w/ recommendation for NWB to LLE), urinary retention which resolved, atrial fibrillation with RVR- managed with digoxin and metoprolol- discharged to [facility] referred back to the hospital for reported AMS and transient hypotension at [facility]. Pt described as wanting to remove his knee immobilizer and later sat himself on the ground- no reported head trauma. Per report by [facility] staff, he had a blood pressure of 75/47. Patient states he was being restrained and wanted to move about. BP on arrival to the ED noted as 133/67 with no intervention undertaken out in the field or the ED  to correct the blood pressure.     He denies sob, SOOD, suprapubic/abd pain, dysuria, fevers, chills, nausea/vomiting, sputum production. He reports occasional cough with eating. Pt endorses Left knee pain with movement/manipulation. During bedside discussion, patient noted AAOx2 ("11th month of the year, Friday , 24th 2023")..cited location as "[facility]" which was status quo for level of orientation on the day of discharge and throughout most recent hospitalization. Nephew at bedside reports Mr. Boone appears to have been doing well around the time of the Thanksgiving dinner yesterday but states this is not the patient's baseline and he is previously "high functioning" and maintaining his own personal finances. He also expressed concerns that the patient may have hallucinated.    CT angio head/neck were unremarkable at the prior admission where he was also managed by cardiology. Was also assessed by hematology oncology for anemia and evaluated for multiple myeloma with negative blood electrophoresis serologies though urine returned as insufficient.  Anticoagulation deferred due to fall risk at recent hospitalization. Within the 48 hours prior to discharge, systolic BPs were in 110s to 140s.     In the ED, he had a dysphagia screen during which he coughed at the 3rd portion of the volume ingestion. The patient did present as a code stroke.    Pmhx/sochx/meds/all  ROS negative x 10 except as documented in the H&P    *Noted on 4 L in the ED, however 97% on Room air at rest as assessed by the resident team    T(C): 36.5 (24 Nov 2023 18:38), Max: 37.3 (24 Nov 2023 12:00)  T(F): 97.7 (24 Nov 2023 18:38), Max: 99.1 (24 Nov 2023 12:00)  HR: 92 (24 Nov 2023 18:38) (92 - 102)  BP: 119/55 (24 Nov 2023 18:38) (106/79 - 133/67)  RR: 19 (24 Nov 2023 18:38) (18 - 19)  SpO2: 98% (24 Nov 2023 18:38) (98% - 100%)    AAOx2 NAD lying comfortably in bed  dry mucous membranes, speech fluent, no facial asymmetry or dysarthria  no nuchal rigidity  S1S2 irreg/irreg  decreased breath sounds  soft, NT, +   LLE in immobilizer  Labs reviewed- cbc, bmp, lfts       A/P:  1. metabolic encephalopathy  2. altered mental status  3. VAMSI on CKD stage 3  4. suspected urinary retention- based on bladder palpated  5. transient hypotension- at [facility], none documented  6. demand ischemia, elevated troponin  7. onset Afib w/ RVR- now rate controlled   8. recent mechanical fall - r/o left hip fx   9. anemia of chronic inflammation/ disease   10.HTN  11HLD  12BPH  13.DVT ppx     suspect component of delirium, volume depletion, distended bladder concerning for urinary retention,  VAMSI on CKD. Plan to assess for   check orthostatics  was assessed at the bedside  -Pt w/ mechanical fall, no syncope reported. X-ray tibia/fibula/knee/ankle and foot reviewed- no acute fx. However, he continues to have difficulty in ambulation and it appears that his ROM is limited at his left hip even though he primarily complaints of left knee pain. Given on-going symptoms and in Patient seen/evaluated at bedside in the ED on 11/24/23.  Nephew at bedside.  I agree with the resident H&P  note/outlined plan of care. My independent findings and conclusions are documented.    89 yo male with a PMHx of HTN, HLD, BPH, anemia, CKD stage 3 (creatinine 1.77 in outpt -July 2023), recent [facility] hospitalization for fall w/out syncope complicated by a non-displaced left fibular fracture (w/ recommendation for NWB to LLE), urinary retention which resolved, atrial fibrillation with RVR- managed with digoxin and metoprolol- discharged to [facility] referred back to the hospital for reported AMS and transient hypotension at [facility]. Pt described as wanting to remove his knee immobilizer and later sat himself on the ground- no reported head trauma. Per report by [facility] staff, he had a blood pressure of 75/47. Patient states he was being restrained and wanted to move about. BP on arrival to the ED noted as 133/67 with no intervention undertaken out in the field or the ED  to correct the blood pressure.     He denies sob, SOOD, suprapubic/abd pain, dysuria, fevers, chills, nausea/vomiting, sputum production. He reports occasional cough with eating. Pt endorses Left knee pain with movement/manipulation. During bedside discussion, patient noted AAOx2 ("11th month of the year, Friday , 24th 2023")..cited location as [facility]" which was status quo for level of orientation on the day of discharge and throughout most recent hospitalization. Nephew at bedside reports Mr. Boone appears to have been doing well around the time of the Thanksgiving dinner yesterday but states this is not the patient's baseline and he is previously "high functioning" and maintaining his own personal finances. He also expressed concerns that the patient may have hallucinated.    CT angio head/neck were unremarkable at the prior admission where he was also managed by cardiology. Was also assessed by hematology oncology for anemia and evaluated for multiple myeloma with negative blood electrophoresis serologies though urine returned as insufficient.  Anticoagulation deferred due to fall risk at recent hospitalization. Within the 48 hours prior to discharge, systolic BPs were in 110s to 140s.     In the ED, he had a dysphagia screen during which he coughed at the 3rd portion of the volume ingestion- per verbal endorsement by the ED RN. The patient did not present as a code stroke.    Pmhx/sochx/meds/all as per details of the H&P  ROS negative x 10 except as documented in the H&P    *Noted on 4 L in the ED, however 97% on Room air at rest as assessed by the resident team    T(C): 36.5 (24 Nov 2023 18:38), Max: 37.3 (24 Nov 2023 12:00)  T(F): 97.7 (24 Nov 2023 18:38), Max: 99.1 (24 Nov 2023 12:00)  HR: 92 (24 Nov 2023 18:38) (92 - 102)  BP: 119/55 (24 Nov 2023 18:38) (106/79 - 133/67)  RR: 19 (24 Nov 2023 18:38) (18 - 19)  SpO2: 98% (24 Nov 2023 18:38) (98% - 100%)    AAOx2 NAD lying comfortably in bed  dry mucous membranes, speech fluent, no facial asymmetry or dysarthria  no nuchal rigidity  S1S2 irreg/irreg  decreased breath sounds  soft, NT, +   LLE in immobilizer    Labs reviewed- BMP, CBC  creatinine 2.81<--1.37 (11/22/23)  hgb 9.0/28.7<--8.3/25.5  troponin 165.4    HEAD CT:  No acute intracranial process  CERVICAL SPINE CT:  No cervical neck fracture    A/P:  1. metabolic encephalopathy  2. altered mental status-likely component of delirium  3. VAMSI on CKD stage 3  4. suspected urinary retention- based on bladder palpated  5. transient hypotension- at [facility], none documented  6. demand ischemia, elevated troponin  7. onset Afib w/ RVR- now rate controlled   8. left fibular fracture with immobilizer in place  9. anemia of chronic inflammation/ disease   10. dysphagia?  11.HTN  12HLD  13BPH    -Suspect component of delirium, volume depletion, distended bladder concerning for urinary retention,  VAMSI on CKD. UA, CT cervical spine and head unremarkable  -was seen by neurology at bedside (Dr. Terrell- who discussed a possible LP, patient and nephew defer and would like to monitor progress)  -Plan to assess for infectious process- check blood cx, check ammonia level/LFTs, serial CPK, repeat BMP tonight requested to determine trend and need for formal renal US/nephrology consult  -IVF hydration  -check digoxin level- hold until level returns. Continue with beta blocker  Check orthostatics, admitted to telemetry due to elevation in troponins, cycle cardiac biomarkers, monitor on telemetry x 24 hrs  -check status of left fibular fracture with repeat xray for stability  -verbally endorsed to both myself and resident separately that the patient had passed the 5ml and 20ml volume trial and failed the 90ml volume trial. Pt is not a code stroke activation. check speech and swallow consult,  -puree diet, thickened liquids with teaspoon feeds at bedside until formal speech and swallow consult  -c/w tamsolusin  -fall precautions, maintain immobilizer  -dvt ppx Patient seen/evaluated at bedside in the ED on 11/24/23.  Nephew at bedside.  I agree with the resident H&P  note/outlined plan of care. My independent findings and conclusions are documented.    89 yo male with a PMHx of HTN, HLD, BPH, anemia, CKD stage 3 (creatinine 1.77 in outpt -July 2023), recent [facility] hospitalization for fall w/out syncope complicated by a non-displaced left fibular fracture (w/ recommendation for NWB to LLE), urinary retention which resolved, atrial fibrillation with RVR- managed with digoxin and metoprolol- discharged to [facility] referred back to the hospital for reported AMS and transient hypotension at [facility]. Pt described as wanting to remove his knee immobilizer and later sat himself on the ground- no reported head trauma. Per report by [facility] staff, he had a blood pressure of 75/47. Patient states he was being restrained and wanted to move about. BP on arrival to the ED noted as 133/67 with no intervention undertaken out in the field or the ED  to correct the blood pressure.     He denies sob, SOOD, suprapubic/abd pain, dysuria, fevers, chills, nausea/vomiting, sputum production. He reports occasional cough with eating. Pt endorses Left knee pain with movement/manipulation. During bedside discussion, patient noted AAOx2 ("11th month of the year, Friday , 24th 2023")..cited location as [facility]" which was status quo for level of orientation on the day of discharge and throughout most recent hospitalization. Nephew at bedside reports Mr. Boone appears to have been doing well around the time of the Thanksgiving dinner yesterday but states this is not the patient's baseline and he is previously "high functioning" and maintaining his own personal finances. He also expressed concerns that the patient may have hallucinated.    CT angio head/neck were unremarkable at the prior admission where he was also managed by cardiology. Was also assessed by hematology oncology for anemia and evaluated for multiple myeloma with negative blood electrophoresis serologies though urine returned as insufficient.  Anticoagulation deferred due to fall risk at recent hospitalization. Within the 48 hours prior to discharge, systolic BPs were in 110s to 140s.     In the ED, he had a dysphagia screen during which he coughed at the 3rd portion of the volume ingestion- per verbal endorsement by the ED RN. The patient did not present as a code stroke.    Pmhx/sochx/meds/all as per details of the H&P  ROS negative x 10 except as documented in the H&P    *Noted on 4 L in the ED, however 97% on Room air at rest as assessed by the resident team    T(C): 36.5 (24 Nov 2023 18:38), Max: 37.3 (24 Nov 2023 12:00)  T(F): 97.7 (24 Nov 2023 18:38), Max: 99.1 (24 Nov 2023 12:00)  HR: 92 (24 Nov 2023 18:38) (92 - 102)  BP: 119/55 (24 Nov 2023 18:38) (106/79 - 133/67)  RR: 19 (24 Nov 2023 18:38) (18 - 19)  SpO2: 98% (24 Nov 2023 18:38) (98% - 100%)    AAOx2 NAD lying comfortably in bed  dry mucous membranes, speech fluent, no facial asymmetry or dysarthria  no nuchal rigidity  S1S2 irreg/irreg  decreased breath sounds  soft, NT, +   LLE in immobilizer    Labs reviewed- BMP, CBC  creatinine 2.81<--1.37 (11/22/23)  hgb 9.0/28.7<--8.3/25.5  troponin 165.4    b12/folate from 11/19/23 are 465 and 6.0 respectively    HEAD CT:  No acute intracranial process  CERVICAL SPINE CT:  No cervical neck fracture    A/P:  1. metabolic encephalopathy  2. altered mental status-likely component of delirium  3. VAMSI on CKD stage 3  4. suspected urinary retention- based on bladder palpated  5. transient hypotension- at [facility], none documented  6. demand ischemia, elevated troponin  7. onset Afib w/ RVR- now rate controlled   8. left fibular fracture with immobilizer in place  9. anemia of chronic inflammation/ disease + macrocytosis  10. dysphagia?  11.HTN  12HLD  13BPH    -Suspect component of delirium, volume depletion, distended bladder concerning for urinary retention,  VAMSI on CKD. UA, CT cervical spine and head unremarkable  -was seen by neurology at bedside (Dr. Terrell- who discussed a possible LP, patient and nephew defer and would like to monitor progress)  -Plan to assess for infectious process- check blood cx, check ammonia level/LFTs, serial CPK, repeat BMP tonight requested to determine trend and need for formal renal US/nephrology consult  -IVF hydration  -check digoxin level- hold until level returns. Continue with beta blocker  Check orthostatics, admitted to telemetry due to elevation in troponins, cycle cardiac biomarkers, monitor on telemetry x 24 hrs  -check status of left fibular fracture with repeat xray for stability  -verbally endorsed to both myself and resident separately that the patient had passed the 5ml and 20ml volume trial and failed the 90ml volume trial. Pt is not a code stroke activation. check speech and swallow consult,  -puree diet, thickened liquids with teaspoon feeds at bedside until formal speech and swallow consult  -c/w tamsolusin  -fall precautions, maintain immobilizer  -dvt ppx  -pt previously referred to nephrology by pmd but patient had declined assessment Patient seen/evaluated at bedside in the ED on 11/24/23.  Nephew at bedside.  I agree with the resident H&P  note/outlined plan of care. My independent findings and conclusions are documented.    89 yo male with a PMHx of HTN, HLD, BPH, anemia, CKD stage 3 (creatinine 1.77 in outpt -July 2023), recent [facility] hospitalization for fall w/out syncope complicated by a non-displaced left fibular fracture (w/ recommendation for NWB to LLE), urinary retention which resolved, atrial fibrillation with RVR- managed with digoxin and metoprolol- discharged to [facility] referred back to the hospital for reported AMS and transient hypotension at [facility]. Pt described as wanting to remove his knee immobilizer and later sat himself on the ground- no reported head trauma. Per report by [facility] staff, he had a blood pressure of 75/47. Patient states he was being restrained and wanted to move about. BP on arrival to the ED noted as 133/67 with no intervention undertaken out in the field or the ED  to correct the blood pressure.     He denies sob, SOOD, suprapubic/abd pain, dysuria, fevers, chills, nausea/vomiting, sputum production. He reports occasional cough with eating. Pt endorses Left knee pain with movement/manipulation. During bedside discussion, patient noted AAOx2 ("11th month of the year, Friday , 24th 2023")..cited location [facility]" which was status quo for level of orientation on the day of discharge and throughout most recent hospitalization. Nephew at bedside reports Mr. Boone appears to have been doing well around the time of the Thanksgiving dinner yesterday but states this is not the patient's baseline and he is previously "high functioning" and maintaining his own personal finances. He also expressed concerns that the patient may have hallucinated.    CT angio head/neck were unremarkable at the prior admission where he was also managed by cardiology. Was also assessed by hematology oncology for anemia and evaluated for multiple myeloma with negative blood electrophoresis serologies though urine returned as insufficient.  Anticoagulation deferred due to fall risk at recent hospitalization. Within the 48 hours prior to discharge, systolic BPs were in 110s to 140s.     In the ED, he had a dysphagia screen during which he coughed at the 3rd portion of the volume ingestion- per verbal endorsement by the ED RN. The patient did not present as a code stroke.    Pmhx/sochx/meds/all as per details of the H&P  ROS negative x 10 except as documented in the H&P    *Noted on 4 L in the ED, however 97% on Room air at rest as assessed by the resident team    T(C): 36.5 (24 Nov 2023 18:38), Max: 37.3 (24 Nov 2023 12:00)  T(F): 97.7 (24 Nov 2023 18:38), Max: 99.1 (24 Nov 2023 12:00)  HR: 92 (24 Nov 2023 18:38) (92 - 102)  BP: 119/55 (24 Nov 2023 18:38) (106/79 - 133/67)  RR: 19 (24 Nov 2023 18:38) (18 - 19)  SpO2: 98% (24 Nov 2023 18:38) (98% - 100%)    AAOx2 NAD lying comfortably in bed  dry mucous membranes, speech fluent, no facial asymmetry or dysarthria  no nuchal rigidity  S1S2 irreg/irreg  decreased breath sounds  soft, NT, +   LLE in immobilizer    Labs reviewed- BMP, CBC  creatinine 2.81<--1.37 (11/22/23)  hgb 9.0/28.7<--8.3/25.5  troponin 165.4  UA: 2-6 WBC, trace protein, moderate blood w/ 5-10 RBC    b12/folate from 11/19/23 are 465 and 6.0 respectively    HEAD CT:  No acute intracranial process  CERVICAL SPINE CT:  No cervical neck fracture    A/P:  1. metabolic encephalopathy  2. altered mental status-likely component of delirium  3. VAMSI on CKD stage 3  4. suspected urinary retention- based on bladder palpated  5. transient hypotension- at [facility], none documented  6. demand ischemia, elevated troponin  7. onset Afib w/ RVR- now rate controlled   8. left fibular fracture with immobilizer in place  9. anemia of chronic inflammation/ disease + macrocytosis  10. dysphagia?  11.HTN  12HLD  13BPH    -Suspect component of delirium, volume depletion, distended bladder concerning for urinary retention,  VAMSI on CKD. UA, CT cervical spine and head unremarkable  -was seen by neurology at bedside (Dr. Terrell- who discussed a possible LP, patient and nephew defer and would like to monitor progress)  -Plan to assess for infectious process- check blood cx, check ammonia level/LFTs, serial CPK, repeat BMP tonight requested to determine trend and need for formal renal US/nephrology consult  -IVF hydration  -check digoxin level- hold until level returns. Continue with beta blocker  Check orthostatics, admitted to telemetry due to elevation in troponins, cycle cardiac biomarkers, monitor on telemetry x 24 hrs  -check status of left fibular fracture with repeat xray for stability  -verbally endorsed to both myself and resident separately that the patient had passed the 5ml and 20ml volume trial and failed the 90ml volume trial. Pt is not a code stroke activation. check speech and swallow consult,  -puree diet, thickened liquids with teaspoon feeds at bedside until formal speech and swallow consult  -c/w tamsolusin  -fall precautions, maintain immobilizer  -dvt ppx  -pt previously referred to nephrology by pmd but patient had declined assessment

## 2023-11-24 NOTE — CHART NOTE - NSCHARTNOTEFT_GEN_A_CORE
Chart reviewed for updates and new lab values. Patient reassessed at bedside    Medical team had verbally endorsed request for bladder scan and placed order during ED course at 4:38 pm. At 8:55pm a signed notation (opened for 1:05pm) revealed the patient had a straight catheterization with 300 cc of post void output- information not known by the accepting provider team. Patient has a prior history of urinary retention though he had passed a prior voiding trial.  Now noted with increased creatinine to 3.03 this evening with an ammonia level of 120. Currently, pt is pleasant, alert, cooperative and follows instructions at bedside.     -Possible combination of ATN in setting of reported transient episode of hypotension earlier today +  renal dysfunction secondary to urinary retention. For now would maintain nevarez catheter x 24 hrs- consider de-escalate to intermittent straight catheterization thereafter, monitor Is/Os  -abdominal ultrasound with focus on liver, kidney/bladder. Of note, patient did have a prior CT abd questioned possibility of hepatic iron deposition- iron studies obtained during prior admission and during hematology-oncology assessment  -c/w IVF hydration, monitor volume and respiratory status  -nephrology consult, Dr. Reyna  -low dose regimen lactulose- titrate to 2 soft BM, hold for diarrhea. Suspect primary process is urinary retention/ATN though cannot exclude the significant hyperammonemia is contributing  -goals of care discussion Chart reviewed for updates and new lab values. Patient reassessed at bedside    Medical team had verbally endorsed request for bladder scan and placed order during ED course at 4:38 pm. At 8:55pm a signed notation (opened for 1:05pm) revealed the patient had a straight catheterization with 300 cc of post void output- information not known by the accepting provider team. Patient has a prior history of urinary retention though he had passed a prior voiding trial.  Now noted with increased creatinine to 3.03 this evening with an ammonia level of 120. Currently, pt is pleasant, alert, cooperative and follows instructions at bedside.     -Possible combination of ATN in setting of reported transient episode of hypotension earlier today +  renal dysfunction secondary to urinary retention. For now would maintain nevarez catheter x 24 hrs- consider de-escalate to intermittent straight catheterization thereafter, monitor Is/Os  -abdominal ultrasound with focus on liver, kidney/bladder. Of note, patient did have a prior CT abd questioned possibility of hepatic iron deposition- iron studies obtained during prior admission with hematology-oncology assessment  -c/w IVF hydration, monitor volume and respiratory status  -nephrology consult, Dr. Reyna  -low dose regimen lactulose- titrate to 2 soft BM, hold for diarrhea. Suspect primary process is urinary retention/ATN though cannot exclude the significant hyperammonemia is contributing  -goals of care discussion Chart reviewed for updates and new lab values.     Medical team had verbally endorsed request for bladder scan and placed order during ED course at 4:38 pm. At 8:55pm a signed notation (opened for 1:05pm) revealed the patient had a straight catheterization with 300 cc of post void output- information not known by the accepting provider team. Patient has a prior history of urinary retention though he had passed a prior voiding trial.  Now noted with increased creatinine to 3.03 this evening with an ammonia level of 120.    -Possible combination of ATN in setting of reported transient episode of hypotension earlier today +  renal dysfunction secondary to urinary retention. For now would maintain nevarez catheter x 24 hrs- consider de-escalate to intermittent straight catheterization thereafter, monitor Is/Os  -abdominal ultrasound with focus on liver, kidney/bladder. Of note, patient did have a prior CT abd questioned possibility of hepatic iron deposition- iron studies obtained during prior admission with hematology-oncology assessment  -c/w IVF hydration, monitor volume and respiratory status  -nephrology consult, Dr. Reyna  -low dose regimen lactulose- titrate to 2 soft BM, hold for diarrhea. Suspect primary process is urinary retention/ATN though cannot exclude the significant hyperammonemia is contributing  -goals of care discussion Chart reviewed for updates and new lab values.     Medical team had verbally endorsed request for bladder scan and placed order during ED course at 4:38 pm. At 8:55pm a signed notation (opened for 1:05pm) revealed the patient had a straight catheterization with 300 cc of post void output- information not known by the accepting provider team. Patient has a prior history of urinary retention though he had passed a prior voiding trial.  Now noted with increased creatinine to 3.03 this evening with an ammonia level of 120.    -Possible combination of ATN in setting of reported transient episode of hypotension earlier today +  renal dysfunction secondary to urinary retention.  intermittent straight catheterization q 6 hours and prn monitor Is/Os  -abdominal ultrasound with focus on liver, kidney/bladder. Of note, patient did have a prior CT abd questioned possibility of hepatic iron deposition- iron studies obtained during prior admission with hematology-oncology assessment  -c/w IVF hydration, monitor volume and respiratory status  -nephrology consult, Dr. Reyna  -low dose regimen lactulose- titrate to 2 soft BM, hold for diarrhea. Suspect primary process is urinary retention/ATN though cannot exclude the significant hyperammonemia is contributing  -goals of care discussion

## 2023-11-24 NOTE — H&P ADULT - HISTORY OF PRESENT ILLNESS
Patient is a 89 year old male from home, who ambulates independently, with PMH of HTN, HLD, and BPH who was sent by the rehab center for altered mental status and fall in the nursing home. Patient is evaluated at bedside and is awake (AAOx2 - baseline) who reports that  Patient is a 89 year old male from home, who ambulates independently, with PMH of paroxysmal atrial fibrillation, HTN, HLD, CKD and BPH who was sent by the rehab center for altered mental status and fall in the nursing home. Patient is evaluated at bedside and is awake (AAOx2 - baseline) who reports that he remembers falling yesterday after he was trying to get away from people at the rehab center. He reports he was trying to get away as he was being tied down to bed and there were a lot of people trying to hold him. Patient denies feeling light headed or dizzy before he fell. Patient also denies losing consciousness. Patient reports he scraped the side of his head when he fell. As per the NH papers patient was 'noted to be aggressive, confused, trying to remove his knee immobilizer yesterday. This morning patient was noted to be with BP 75/47.'  Patient denies any recent fevers, chills, weakness, fatigue, malaise, headache, dizziness, lightheadedness, chest pain, palpitations, shortness of breath, cough, nausea, vomiting, abdominal pain, diarrhea, constipation, melena, hematochezia, dysuria, urinary frequency, or urgency. Patient denies any other complains at this time.  Of note: Patient was discharged 2 days ago from [facility]. Patient was admitted post fall and found to have fracture of left fibular head. Patient was also found to have new onset atrial fibrillation in last admission.  Patient is a 89 year old male from home, who ambulates independently, with PMH of paroxysmal atrial fibrillation, HTN, HLD, CKD and BPH who was sent by the rehab center for altered mental status and fall in the nursing home. Patient is evaluated at bedside and is awake (AAOx2 - baseline) who reports that he remembers falling yesterday after he was trying to get away from people at the rehab center. He reports he was trying to get away as he was being tied down to bed and there were a lot of people trying to hold him. Patient denies feeling  ..patient reported as confused and  trying to remove his knee immobilizer yesterday. This morning patient was noted to be with BP 75/47.'  Patient denies any recent fevers, chills, weakness, fatigue, malaise, headache, dizziness, lightheadedness, chest pain, palpitations, shortness of breath, cough, nausea, vomiting, abdominal pain, diarrhea, constipation, melena, hematochezia, dysuria, urinary frequency, or urgency. Patient denies any other complains at this time.  Of note: Patient was discharged 2 days ago from [facility]. Patient was admitted post fall and found to have fracture of left fibular head. Patient was also found to have new onset atrial fibrillation in last admission.

## 2023-11-24 NOTE — ED ADULT NURSE NOTE - OBJECTIVE STATEMENT
Pt sent from NH R/O UTI. Pt awake confuse oriented only to name, noted redness to midline saccum, knee brace to Left knee.

## 2023-11-25 NOTE — CONSULT NOTE ADULT - ASSESSMENT
1. VAMSI 2/2 ATN in the setting of hypotension.  Urinary retention may be playing a role as well, though no real improvement with relief of obstruction.  Would continue IVF and maintain fairly even volume status, particularly if pt has poor po intake and may develop a post-obstructive diuresis. Strict I/O.  Check urine studies (urine osm, sodium, creatinine, BUN, protein).    2. Urinary retention- would check serial bladder scans to ensure pt is not retaining.  May need to replace nevarez catheter.    3. Hypotension- seems to have improved.  Continue IVF as necessary.  Metoprolol and digoxin are likely necessary due to hx a-fib with RVR.    4. Hypoalbuminemia- likely due to inflammation and poor intake overall.  Continue to monitor.    [facility] NEPHROLOGY  Jose Reyna M.D.  Amaury Rodgers D.O.  Rivka Cleary M.D.  MD Karla Carbajal, MSN, ANP-C    Telephone: [phone]  Facsimile: [phone] [place] [address], [address]  [place] [address]

## 2023-11-25 NOTE — PATIENT PROFILE ADULT - FALL HARM RISK - HARM RISK INTERVENTIONS
Assistance with ambulation/Assistance OOB with selected safe patient handling equipment/Communicate Risk of Fall with Harm to all staff/Discuss with provider need for PT consult/Monitor for mental status changes/Monitor gait and stability/Move patient closer to nurses' station/Reinforce activity limits and safety measures with patient and family/Reorient to person, place and time as needed/Tailored Fall Risk Interventions/Toileting schedule using arm’s reach rule for commode and bathroom/Use of alarms - bed, chair and/or voice tab/Visual Cue: Yellow wristband and red socks/Bed in lowest position, wheels locked, appropriate side rails in place/Call bell, personal items and telephone in reach/Instruct patient to call for assistance before getting out of bed or chair/Non-slip footwear when patient is out of bed/[place] to call system/Physically safe environment - no spills, clutter or unnecessary equipment/Purposeful Proactive Rounding/Room/bathroom lighting operational, light cord in reach

## 2023-11-25 NOTE — CONSULT NOTE ADULT - SUBJECTIVE AND OBJECTIVE BOX
Full note to follow.    VAMSI 2/2 ATN in the setting of hypotension.  Urinary retention may be playing a role as well, though no real improvement with relief of obstruction.  Would continue IVF and maintain fairly even volume status, particularly if pt has poor po intake and may develop a post-obstructive diuresis. Strict I/O.      [facility] NEPHROLOGY  Jose Reyna M.D.  Amaury Rodgers D.O.  Rivka Cleary M.D.  MD Karla Carbajal, MSN, ANP-C    Telephone: [phone]  Facsimile: [phone]    [address] [place] [address], [address]  [place] [address]   [facility] NEPHROLOGY- CONSULTATION NOTE    Patient is a 89y Male just d/c'ed from [facility] with LE fracture during which he also had urinary retention that resolved who was sent from NH again to the hospital due to fall in NH and severe hypotension today (75/47).  At prior hospitalization, pt was discharged with creatinine 1.37 and on readmission creatinine was 1.8  just 2 days later.  Pt was also found to have >300ml urine on post-void bladder scan and a nevarez cathter was placed.  However, due to agitation/confusion, pt had been pulling on nevarez and so nevarez was removed and currently pt is undergoing regular straight catheterizations.    PAST MEDICAL & SURGICAL HISTORY:  HTN (hypertension)      HLD (hyperlipidemia)      BPH (benign prostatic hyperplasia)      Paroxysmal atrial fibrillation      Chronic kidney disease (CKD)        penicillin (Hives)    Home Medications Reviewed  Hospital Medications:   MEDICATIONS  (STANDING):  ascorbic acid 500 milliGRAM(s) Oral daily  aspirin enteric coated 81 milliGRAM(s) Oral daily  atorvastatin 40 milliGRAM(s) Oral at bedtime  heparin   Injectable 5000 Unit(s) SubCutaneous every 12 hours  influenza  Vaccine (HIGH DOSE) 0.7 milliLiter(s) IntraMuscular once  lactated ringers. 1000 milliLiter(s) (75 mL/Hr) IV Continuous <Continuous>  lactulose Syrup 20 Gram(s) Oral three times a day  metoprolol tartrate 12.5 milliGRAM(s) Oral every 12 hours  tamsulosin 0.4 milliGRAM(s) Oral at bedtime    SOCIAL HISTORY:  Denies ETOh,Smoking,   FAMILY HISTORY:    REVIEW OF SYSTEMS:  CONSTITUTIONAL: No weakness, fevers or chills  EYES/ENT: No visual changes;  No vertigo or throat pain   NECK: No pain or stiffness  RESPIRATORY: No cough, wheezing, hemoptysis; No shortness of breath  CARDIOVASCULAR: No chest pain or palpitations.  GASTROINTESTINAL: No abdominal or epigastric pain. No nausea, vomiting, or hematemesis; No diarrhea or constipation. No melena or hematochezia.  GENITOURINARY: No dysuria, frequency, foamy urine, urinary urgency, incontinence or hematuria  NEUROLOGICAL: No numbness or weakness  SKIN: No itching, burning, rashes, or lesions   VASCULAR: No bilateral lower extremity edema.   All other review of systems is negative unless indicated above.    Vital Signs Last 24 Hrs  T(C): 37.4 (25 Nov 2023 23:29), Max: 37.4 (25 Nov 2023 00:10)  T(F): 99.3 (25 Nov 2023 23:29), Max: 99.3 (25 Nov 2023 00:10)  HR: 88 (25 Nov 2023 23:29) (85 - 113)  BP: 113/62 (25 Nov 2023 23:29) (93/68 - 128/56)  RR: 18 (25 Nov 2023 23:29) (18 - 18)  SpO2: 99% (25 Nov 2023 23:29) (92% - 99%)    Parameters below as of 25 Nov 2023 23:29  Patient On (Oxygen Delivery Method): room air        PHYSICAL EXAM:  Constitutional: NAD, frail  HEENT: anicteric sclera, oropharynx clear, MMM  Neck: No JVD  Respiratory: CTAB, no wheezes, rales or rhonchi  Cardiovascular: S1, S2, RRR  Gastrointestinal: BS+, soft, NT/ND  Extremities: No cyanosis or clubbing. No peripheral edema  Neurological: A/O x 1  Psychiatric: Confused  : No CVA tenderness. No nevarez.   Skin: No rashes      LABS:  11-25  142 <--, 142 <--, 141 <--, 141 <--, 140 <--, 139 <--, 138 <--  142  |  112<H>  |  54<H>  ----------------------------<  116<H>  4.4   |  24  |  3.27<H>    Ca    8.3<L>      25 Nov 2023 08:10  Phos  4.1     11-25  Mg     1.8     11-25    TPro  5.5<L>  /  Alb  2.5<L>  /  TBili  0.6  /  DBili  0.2  /  AST  30  /  ALT  29  /  AlkPhos  48  11-25    Creatinine Trend: 3.27 <--, 3.03 <--, 2.81 <--, 1.37 <--, 1.64 <--, 1.75 <--, 1.75 <--                        7.3    10.35 )-----------( 250      ( 25 Nov 2023 08:10 )             23.0     Urine Studies:  Urinalysis Basic - ( 25 Nov 2023 08:10 )    Color:  / Appearance:  / SG:  / pH:   Gluc: 116 mg/dL / Ketone:   / Bili:  / Urobili:    Blood:  / Protein:  / Nitrite:    Leuk Esterase:  / RBC:  / WBC    Sq Epi:  / Non Sq Epi:  / Bacteria:       Sodium, Random Urine: 19 mmol/L (11-25 @ 12:16)  Creatinine, Random Urine: 280 mg/dL (11-25 @ 12:16)  Osmolality, Random Urine: 402 mos/kg (11-25 @ 12:16)  Sodium, Random Urine: 48 mmol/L (11-19 @ 06:02)  Creatinine, Random Urine: 125 mg/dL (11-19 @ 06:02)  Osmolality, Random Urine: 471 mos/kg (11-19 @ 06:02)    RADIOLOGY & ADDITIONAL STUDIES:      < from: Xray Chest 1 View- PORTABLE-Urgent (Xray Chest 1 View- PORTABLE-Urgent .) (11.24.23 @ 14:42) >    ACC: [id number] EXAM:  XR CHEST PORTABLE URGENT 1V   ORDERED BY: FRANK MORROW     PROCEDURE DATE:  11/24/2023          INTERPRETATION:  HISTORY:  Admitting Dxs: N17.9 ACUTE KIDNEY FAILURE,   UNSPECIFIED; AMS;  TECHNIQUE: Portable frontal view of the chest, 1 view.  COMPARISON none.  FINDINGS/  IMPRESSION:    HEART:difficult to access in this projection.  LUNGS: There is a linear opacity at the left base, etiology unclear.   Consider CT chest. Right lung is clear..  BONES: degenerative changes    --- End of Report ---            MADISON COSTA MD; Attending Interventional Radiologist  This document has been electronically signed. Nov 25 2023  7:15AM    < end of copied text >      < from: US Renal (11.22.23 @ 11:06) >    ACC: [id number] EXAM:  US KIDNEY(S)   ORDERED BY: EDUAR KNOTT     PROCEDURE DATE:  11/22/2023          INTERPRETATION:  CLINICAL INFORMATION: Chronic kidney disease    COMPARISON: No prior renal ultrasound is available for comparison.    TECHNIQUE:Sonography of the kidneys and bladder.    FINDINGS:  Right kidney: 11.4 cm. No renal mass, hydronephrosis or calculi. 1.5 x   1.8 x 1.5 cm cyst in the right kidney.    Left kidney: 7.2 cm which is atrophic.. No renal mass, hydronephrosis or   calculi.    Urinary bladder: Unremarkable.    IMPRESSION:  No hydronephrosis.    Atrophic left kidney.    Small right renal cyst.    --- End of Report ---            OG LAURA MD; Attending Radiologist  This document has been electronically signed. Nov 22 2023 12:29PM    < end of copied text >

## 2023-11-25 NOTE — PROGRESS NOTE ADULT - ASSESSMENT
CC:  Thrombocytopenia    HPI:  Ms. Liriano is a 68 yo AAF with type 2 mixed cryoglobulinemic vasculitis, history of diffuse alveolar hemorrhage, Rheumatoid arthritis, neuropathy, diastolic CHF, Diabetes, CKD, MGUS, who presents today for further management of thrombocytopenia. She was previously seen by Dr. Wilkerson in the office and also on the consult service.  She was initially hospitalized in June 2017 for thrombocytopenia with Plt in the 30s, petechiae in LE and LE edema. Heme was consulted at that time. SPEP on 6/5/17 showed a 0.35 g/dL paraprotein band in the gamma region, similar to the SPEP on 6/21/16 with a 0.34 g/dL paraprotein band in the gamma region. IgG normal at 1152 IgA elevated 412, IgM elevated 550. Kappa 49.79, lambda 5.54, K/L ratio 8.99. IFX 6/29/17 negative for monoclonal protein. NGS peripheral blood 6/29/17 showed no pathogenic alterations detected. Bone marrow biopsy on 6/5/17 showed a 50-60% cellularity bone marrow, trilieage hematopoietic activity, grade 1-2 reticular fibrosis, addquate number of megakaryocytes with occasional hypolobated megakaryocytes. Cytogenetics one one out of 20 cells had a 5q- deletion. However, a solitary cell with a cytogenetic aberration is not diagnostic of a clonal abnormality. No evidence of plasma cell dyscrasia or lymphoma. Skin biopsy showed urticarial dermatitis; IHC staining showed some weak granular basement membrane staining with IgM. Total complement level and C4 were undetectable. Cryoglobulin assay showed a precipitate both at body temperature and with exposure to blank, therefore a definitive detection of cryoglobulin could not be made. Her plt nadired at 19. She was treated with steroids with improvement of plt count to the 40-55 range. She was again hospitalized from 6/29/17 to 7/16/17 after she was found to have GI bleed with a Hb of 6.3 at Dr. Wilkerson's office. EGD and colonoscopy on 7/5/17 did not show bleeding sites. She was hospitalized from  7/8/17 to 7/31/17 for tongue swelling thought 2/2 Bumex. She then developed hemoptysis and increased oxygen need requiring ICU stay, and was found to have diffuse alveolar hemorrhage on bronchoscopy. She was diagnosed with type II mixed cryoglobulinemic vasculitis (monoclonal IgM and polyclonal IgG) and was treated with weekly Rituxan on 7/14, 7/21, 7/28 and steroids for DAH. She was again hospitalized from 8/2/17-8/14/17 for weakness, and developed acute respiratory failure thought 2/2 hypertensive emergency during the hospital stay. Her plt count is 98 on discharge. On outpatient visit on 9/6/17, her plt count was 59. She presents today for follow up.     She currently has pain in her hands and some nausea. She feels that the only medication which helps with her nausea is phenergan syrup. She denies any signs of bleeding or easy bruising. She is not on any steroids. There is no family history of hematologic disease. Her father also had rheumatoid arthritis.          Past Medical History:   Diagnosis Date    *Atrial fibrillation     Abnormal neurological exam 8/30/2016    Adrenal cortical steroids causing adverse effect in therapeutic use 7/19/2017    Allergy to bumetanide 7/9/2017         Anxiety     Aut neuropthy in other disease     Suspected due to RA, per Neuromuscular specialist at [facility]    Blood transfusion     BPPV (benign paroxysmal positional vertigo) 8/30/2016    Bronchitis     Cataract     Chronic neck pain     Community acquired bacterial pneumonia 1/18/2013    Cryoglobulinemic vasculitis 7/9/2017    Treatment per hematology.  Should be noted that biologics such as Rituxan have been reported to cause ILD.    CVA (cerebral vascular accident) 1/16/2015    Depression     Diastolic dysfunction     DJD (degenerative joint disease) of cervical spine 8/16/2012    Dysphagia     Fracture of right foot     Gait disorder 8/16/2012    GERD (gastroesophageal reflux disease)     Headache  89 year old male from home, who ambulates independently, with PMH of paroxysmal atrial fibrillation, HTN, HLD, CKD and BPH who was sent by the rehab center for altered mental status and fall in the nursing home.  1.Tele monitoring.  2.AMS-Neurology eval noted.  3.Ammonia elevated-lactulose.  4.PAF-asa,dig qod,no ac due to fall bleed risk.  5.VAMSI on CRI with known urinary retention-Renal eval IVF, may need nevarez.  6.Lipid d/o-statin.  7.Abnl cx-consider CT chest-?aspiration pneumonia.  8.GI and DVT prophylaxis. 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    Memory loss 10/28/2012    Neural foraminal stenosis of cervical spine     Peripheral neuropathy 8/30/2016    Pneumonia 1/18/2013    Rheumatoid arthritis     S/P cholecystectomy 5/27/2015    Sensory ataxia 2008    Due to severe peripheral neuropathy    Seropositive rheumatoid arthritis of multiple sites 11/23/2015    Stroke     Type 2 diabetes mellitus with stage 3 chronic kidney disease, without long-term current use of insulin 1/18/2013       Family History   Problem Relation Age of Onset    Diabetes Mother     Heart disease Mother     Cataracts Mother     Glaucoma Mother     Arthritis Father     Cancer Sister     Blindness Paternal Aunt     Diabetes Paternal Aunt        Past Surgical History:   Procedure Laterality Date    BREAST SURGERY      2cyst removed    CATARACT EXTRACTION  7/15/2013    left eye    CATARACT EXTRACTION  7/29/13    right eye    CERVICAL FUSION      CHOLECYSTECTOMY  5/26/15    with cholangiogram    COLONOSCOPY      COLONOSCOPY N/A 7/3/2017    Procedure: COLONOSCOPY;  Surgeon: Rusty Huertas MD;  Location: [facility] ([address] [place] [address]);  Service: Endoscopy;  Laterality: N/A;    COLONOSCOPY N/A 7/5/2017    Procedure: COLONOSCOPY;  Surgeon: Rusty Huertas MD;  Location: [facility] ([address] [place] [address]);  Service: Endoscopy;  Laterality: N/A;    HYSTERECTOMY      JOINT REPLACEMENT      bilateral knees    KNEE SURGERY      both knees    ORIF HUMERUS FRACTURE  04/26/2011    Left    UPPER GASTROINTESTINAL ENDOSCOPY         Social History     Social History    Marital status:      Spouse name: N/A    Number of children: 5    Years of education: N/A     Occupational History    Disabled      Social History Main Topics    Smoking status: Never Smoker    Smokeless tobacco: Never Used    Alcohol  use No    Drug use: No    Sexual activity: No     Other Topics Concern    Not on file     Social History Narrative    No narrative on file       Review of Systems   Constitutional: Positive for fatigue. Negative for activity change, appetite change, chills, fever and unexpected weight change.   HENT: Negative for facial swelling, mouth sores, nosebleeds, tinnitus, trouble swallowing and voice change.    Eyes: Negative for pain, redness and visual disturbance.   Respiratory: Negative for cough, chest tightness, shortness of breath, wheezing and stridor.    Cardiovascular: Negative for chest pain, palpitations and leg swelling.   Gastrointestinal: Positive for nausea. Negative for abdominal distention, abdominal pain, anal bleeding, blood in stool, constipation, diarrhea and vomiting.   Endocrine: Negative for polydipsia, polyphagia and polyuria.   Genitourinary: Negative for dysuria, frequency and hematuria.   Musculoskeletal: Positive for arthralgias. Negative for back pain, joint swelling, neck pain and neck stiffness.   Skin: Negative for color change, pallor, rash and wound.   Neurological: Positive for weakness (chronic). Negative for dizziness, seizures, syncope, light-headedness, numbness and headaches.   Hematological: Negative for adenopathy. Does not bruise/bleed easily.   Psychiatric/Behavioral: Negative for agitation, confusion and dysphoric mood. The patient is not nervous/anxious.        Objective:  Physical Exam   Constitutional: She is oriented to person, place, and time. She appears well-developed and well-nourished. No distress.   HENT:   Head: Normocephalic and atraumatic.   Mouth/Throat: Oropharynx is clear and moist. No oropharyngeal exudate.   Eyes: Conjunctivae and EOM are normal. Pupils are equal, round, and reactive to light. No scleral icterus.   Neck: Normal range of motion. Neck supple. No thyromegaly present.   Cardiovascular: Normal rate, regular rhythm, normal heart sounds and  intact distal pulses.  Exam reveals no gallop and no friction rub.    No murmur heard.  Pulmonary/Chest: Effort normal and breath sounds normal. No respiratory distress. She has no wheezes. She has no rales.   Abdominal: Soft. Bowel sounds are normal. She exhibits no distension and no mass. There is no hepatosplenomegaly. There is no tenderness. No hernia.   Musculoskeletal: Normal range of motion. She exhibits deformity (rheumatoid changes in hand joints). She exhibits no edema or tenderness.   Lymphadenopathy:     She has no cervical adenopathy.   Neurological: She is alert and oriented to person, place, and time. She exhibits normal muscle tone.   Skin: Skin is warm and dry. No rash noted. She is not diaphoretic. No erythema. No pallor.   Psychiatric: She has a normal mood and affect. Her behavior is normal. Judgment and thought content normal.       Labs:  I reviewed all her pertinent labs over the past 3 months (see HPI). CBC and CMP showed improvement of plt count to 275.    Assessment and Plan:    1. Thrombocytopenia    2. Cryoglobulinemic vasculitis    3. Diffuse pulmonary alveolar hemorrhage    4. Type 2 diabetes mellitus with stage 3 chronic kidney disease and hypertension    5. Nausea      - Ms. 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 She responded to Rituxan treatment and steroids in July. She was also diagnosed with type 2 mixed cryoglobulinemic vasculitis. She did have a history of MGUS, but BMBx on 6/5/17 did not show any evidence of lymphoma or plasma cell dyscrasia. I feel that her type 2 mixed cryoglobulinemic vasculitis is likely from chronic inflammatory states rather than lymphoproliferative disorder.   - Her plt count is normal today. No signs of bleeding. No intervention needed today.   - She feels that the only medication  helps with her nausea is phenergan syrup. Sent prescription to pharmacy  - RTC in one month with repeat labs to monitor. Instructed patient to go to the ER if she develops any bleeding.

## 2023-11-25 NOTE — PATIENT PROFILE ADULT - FUNCTIONAL ASSESSMENT - BASIC MOBILITY 4.
Mr. Emerson feel well.  No neuro complaints.  He would like to discharge.  Had low potassium today which was replaced.  He will d/c this afternoon on keppra + decadron.  Neurosurgery asked me to keep him on decadron until they see him 8/1.  He also should have an outpatient EEG and follow-up with neurology in a month.   2 = A lot of assistance

## 2023-11-25 NOTE — PROGRESS NOTE ADULT - PROBLEM SELECTOR PLAN 3
P/w Cr of 2.81 (unknown baseline but discharged with Cr of 1.37)  Likely prerenal in setting of dehydration vs episode of hypotension.   Avoid nephrotoxic agents.   monitor BMP

## 2023-11-26 NOTE — PROGRESS NOTE ADULT - PROBLEM SELECTOR PLAN 2
hg is 7.3  .6  - F/U B12, homocystine and methylmalonic acid levels
Pt w Macrocytic Anemia  - hg is 7.3 > 7.8  - .6 >105  - F/U B12, homocystine and methylmalonic acid levels

## 2023-11-26 NOTE — PROGRESS NOTE ADULT - PROBLEM SELECTOR PLAN 4
Patient presented with fall at rehab center.   Recent last admission for similar complain.   CT left LE - 11/20/23 - Left knee: Acute minimally displaced obliquely oriented fracture of the fibular head.  Patient discharged to rehab with NWB instructions.   PT consult.
Patient presented with fall at rehab center.   - Recent last admission for similar complain.   - CT left LE - 11/20/23 - Left knee: Acute minimally displaced obliquely oriented fracture of the fibular head.  - Patient discharged to rehab with NWB instructions.   - PT consult.

## 2023-11-26 NOTE — PROGRESS NOTE ADULT - ASSESSMENT
89 year old male from home, who ambulates independently, with PMH of paroxysmal atrial fibrillation, HTN, HLD, CKD and BPH who was sent by the rehab center for altered mental status and fall in the nursing home.  1.Tele monitoring.  2.AMS-Neurology eval noted.  3.Ammonia elevated-lactulose.  4.PAF-asa,dig qod,no ac due to fall bleed risk.  5.VAMSI on CRI with known urinary retention-Renal f/u,IVF, may need nevarez.  6.Lipid d/o-statin.  7.Abnl cx-consider CT chest-?aspiration pneumonia.  8.GI and DVT prophylaxis.

## 2023-11-26 NOTE — PROGRESS NOTE ADULT - ASSESSMENT
1. VAMSI 2/2 ATN in the setting of hypotension.  Urinary retention may be playing a role as well, though no real improvement with relief of obstruction.  Would continue IVF and maintain fairly even volume status, particularly if pt has poor po intake and may develop a post-obstructive diuresis. Strict I/O.  Check urine studies (urine osm, sodium, creatinine, BUN, protein).    2. Urinary retention- Bladder scan: Post void residual this am 300.  Would check serial bladder scans to ensure pt is not retaining.  May need to replace nevarez catheter.    3. Hypotension- seems to have improved.  Continue IVF as necessary.  Metoprolol and digoxin are likely necessary due to hx a-fib with RVR.    4. Hypoalbuminemia- likely due to inflammation and poor intake overall.  Continue to monitor.    [facility]  Jose Reyna M.D.  Amaury Rodgers D.O.  Rivka Cleary M.D.  MD Karla Carbajal, MSN, ANP-C    Telephone: [phone]  Facsimile: [phone] [place] [address], [address]  [place] [address]

## 2023-11-26 NOTE — PROGRESS NOTE ADULT - PROBLEM SELECTOR PLAN 5
Found to have new onset atrial fibrillation during last admission.   - Discharged on digoxin, metoprolol.   - Hold digoxin until digoxin level result (due to VAMSI).   - C/w metoprolol.   - Pt not on AC due to frequent falls.  - Consulted cardio - Dr. Valencia.
Found to have new onset atrial fibrillation during last admission.   Discharged on digoxin, metoprolol.   Hold digoxin until digoxin level result (due to VAMSI).   C/w metoprolol.   Hold AC due to frequent falls.  COnsulted cardio - Dr. Valencia.

## 2023-11-26 NOTE — PROGRESS NOTE ADULT - ASSESSMENT
89 year old male from home, who ambulates independently, with PMH of paroxysmal atrial fibrillation, HTN, HLD, CKD and BPH who was sent by the rehab center for altered mental status and fall in the nursing home. Patient is being admitted for further management of acute metabolic encephalopathy, recurrent fall and VAMSI.

## 2023-11-26 NOTE — PROGRESS NOTE ADULT - PROBLEM SELECTOR PLAN 1
Pt sent from NH for acute agitation and fall.   - Likely episode of delirium  - Might be 2/2 to worsening dementia vs uremia vs other electrolyte abnormality vs infection vs urinary retention.   - HEAD CT:  No evidence of an acute traumatic intracranial injury.  - CERVICAL SPINE CT:  No evidence of an acute cervical spine fracture.  - Ammonia level is 129   - c/w lactulose.   - F/U B12, homocystine and methylmalonic acid levels   - Consulted neurology - Dr. Terrell.
- Sent from NH for acute agitation and fall.   - Likely episode of delirium  - Might be 2/2 to worsening dementia vs uremia vs other electrolyte abnormality vs infection vs urinary retention.   - On evaluation patient neurologic status same as before discharge but nephew at bedside reports this is not his baseline.   - HEAD CT:  No evidence of an acute traumatic intracranial injury.  - CERVICAL SPINE CT:  No evidence of an acute cervical spine fracture.  -Ammonia level is 129 - c/w lactulose.   - F/U bladder scan.   - F/U B12, homocystine and methylmalonic acid levels   - Consulted neurology - Dr. Terrell.

## 2023-11-27 NOTE — CONSULT NOTE ADULT - SUBJECTIVE AND OBJECTIVE BOX
HPI:  Patient is a 89 year old male from home, who ambulates independently, with PMH of paroxysmal atrial fibrillation, HTN, HLD, CKD and BPH who was sent by the rehab center for altered mental status and fall in the nursing home. Patient is evaluated at bedside and is awake (AAOx2 - baseline) who reports that he remembers falling yesterday after he was trying to get away from people at the rehab center. He reports he was trying to get away as he was being tied down to bed and there were a lot of people trying to hold him. Patient denies feeling  ..patient reported as confused and  trying to remove his knee immobilizer yesterday. This morning patient was noted to be with BP 75/47.'  Patient denies any recent fevers, chills, weakness, fatigue, malaise, headache, dizziness, lightheadedness, chest pain, palpitations, shortness of breath, cough, nausea, vomiting, abdominal pain, diarrhea, constipation, melena, hematochezia, dysuria, urinary frequency, or urgency. Patient denies any other complains at this time.  Of note: Patient was discharged 2 days ago from [facility]. Patient was admitted post fall and found to have fracture of left fibular head. Patient was also found to have new onset atrial fibrillation in last admission.  (24 Nov 2023 16:04)      PAST MEDICAL & SURGICAL HISTORY:  HTN (hypertension)      HLD (hyperlipidemia)      BPH (benign prostatic hyperplasia)      Paroxysmal atrial fibrillation      Chronic kidney disease (CKD)          penicillin (Hives)      Meds:  aspirin enteric coated 81 milliGRAM(s) Oral daily  cefepime   IVPB 2000 milliGRAM(s) IV Intermittent every 24 hours  chlorhexidine 0.12% Liquid 15 milliLiter(s) Oral Mucosa every 12 hours  chlorhexidine 2% Cloths 1 Application(s) Topical <User Schedule>  chlorhexidine 4% Liquid 1 Application(s) Topical <User Schedule>  fentaNYL    Injectable 50 MICROGram(s) IV Push once  fentaNYL   Infusion 1 MICROgram(s)/kG/Hr IV Continuous <Continuous>  heparin   Injectable 5000 Unit(s) SubCutaneous every 12 hours  influenza  Vaccine (HIGH DOSE) 0.7 milliLiter(s) IntraMuscular once  lactated ringers Bolus 1000 milliLiter(s) IV Bolus once  lactulose Syrup 20 Gram(s) Oral three times a day  norepinephrine Infusion 1 MICROgram(s)/kG/Min IV Continuous <Continuous>  pantoprazole  Injectable 40 milliGRAM(s) IV Push daily  phenylephrine    Infusion 0.3 MICROgram(s)/kG/Min IV Continuous <Continuous>  propofol Infusion 40 MICROgram(s)/kG/Min IV Continuous <Continuous>  sodium chloride 0.9% lock flush 10 milliLiter(s) IV Push every 1 hour PRN  vasopressin Infusion 0.04 Unit(s)/Min IV Continuous <Continuous>      SOCIAL HISTORY:  Smoker:  YES / NO        PACK YEARS:                         WHEN QUIT?  ETOH use:  YES / NO               FREQUENCY / QUANTITY:  Ilicit Drug use:  YES / NO  Occupation:  Assisted device use (Cane / Walker):  Live with:    FAMILY HISTORY:      VITALS:  Vital Signs Last 24 Hrs  T(C): 36.4 (27 Nov 2023 08:00), Max: 36.9 (26 Nov 2023 23:14)  T(F): 97.6 (27 Nov 2023 08:00), Max: 98.4 (26 Nov 2023 23:14)  HR: 109 (27 Nov 2023 12:18) (84 - 137)  BP: 97/77 (27 Nov 2023 10:15) (58/18 - 143/91)  BP(mean): 84 (27 Nov 2023 10:15) (31 - 86)  RR: 30 (27 Nov 2023 10:15) (20 - 35)  SpO2: 100% (27 Nov 2023 12:18) (60% - 100%)    Parameters below as of 27 Nov 2023 08:00  Patient On (Oxygen Delivery Method): ventilator    O2 Concentration (%): 100    LABS/DIAGNOSTIC TESTS:                          7.8    10.49 )-----------( 279      ( 26 Nov 2023 08:50 )             25.2     WBC Count: 10.49 K/uL (11-26 @ 08:50)  WBC Count: 10.35 K/uL (11-25 @ 08:10)      11-26    146<H>  |  115<H>  |  66<H>  ----------------------------<  141<H>  4.2   |  24  |  3.26<H>    Ca    9.6      26 Nov 2023 08:50  Phos  4.0     11-26  Mg     2.0     11-26        Urinalysis + Microscopic Examination (11.24.23 @ 12:44)   pH Urine: 5.5  Urine Appearance: Clear  Color: Yellow  Specific Gravity: 1.015  Protein, Urine: Trace mg/dL  Glucose Qualitative, Urine: Negative mg/dL  Ketone - Urine: Negative mg/dL  Blood, Urine: Moderate  Bilirubin: Negative  Urobilinogen: 0.2 E.U./dL  Leukocyte Esterase Concentration: Negative  Nitrite: Negative  White Blood Cell - Urine: 2-6 /HPF  Red Blood Cell - Urine: 5-10 /HPF  Bacteria: Few /HPF  Squamous Epithelial Cells: None Seen            LACTATE:    ABG - ABG - ( 27 Nov 2023 10:38 )  pH, Arterial: 7.13  pH, Blood: x     /  pCO2: 27    /  pO2: 319   / HCO3: 9     / Base Excess: -18.7 /  SaO2: 100                 CULTURES:   .Blood Blood-Venous  11-25 @ 08:10   No growth at 24 hours  --  --      .Blood Blood  11-25 @ 08:05   No growth at 24 hours  --  --      Clean Catch Clean Catch (Midstream)  11-24 @ 12:44   No growth  --  --          RADIOLOGY:< from: Xray Knee 1 or 2 Views, Left (11.26.23 @ 11:48) >  ACC: [id number] EXAM:  XR KNEE 1-2 VIEWS LT   ORDERED BY: JOHN GARCIA     PROCEDURE DATE:  11/26/2023          INTERPRETATION:  Left knee. 2 views.    Prior imaging showed a fracture of the left fibular head.    IMPRESSION examination splint applied.    Small knee effusion noted.    There is a fracture difficult to assess.    IMPRESSION: Splinting.    --- End of Report ---            JOSE ALVAREZ MD; Attending Radiologist  This document has been electronically signed. Nov 26 2023  1:19PM    < end of copied text >  -----------------------------------------------------------------------------------------------------------------------------------------------------------------  ACC: [id number] EXAM:  XR CHEST PORTABLE URGENT 1V   ORDERED BY: FRANK MORROW     PROCEDURE DATE:  11/24/2023          INTERPRETATION:  HISTORY:  Admitting Dxs: N17.9 ACUTE KIDNEY FAILURE,   UNSPECIFIED; AMS;  TECHNIQUE: Portable frontal view of the chest, 1 view.  COMPARISON none.  FINDINGS/  IMPRESSION:    HEART:difficult to access in this projection.  LUNGS: There is a linear opacity at the left base, etiology unclear.   Consider CT chest. Right lung is clear..  BONES: degenerative changes    --- End of Report ---            MADISON COSTA MD; Attending Interventional Radiologist  This document has been electronically signed. Nov 25 2023  7:15AM    < end of copied text >  ------------------------------------------------------------------------------------------------------------------------------------------------------------------------------------------  ACC: [id number] EXAM:  CT ABDOMEN AND PELVIS   ORDERED BY: EDUAR KNOTT     PROCEDURE DATE:  11/21/2023          INTERPRETATION:  CLINICAL INFORMATION: 89 years  Male with Pt w anemia a   hx of melena. Assess for GI bleed.    COMPARISON: None.    CONTRAST/COMPLICATIONS:  IV Contrast: NONE  Oral Contrast: NONE  Complications: None reported at time of study completion    PROCEDURE:  CT of the Abdomen and Pelvis was performed.  Sagittal and coronal reformats were performed.    LIMITATIONS: Evaluation of the solid organs, vascular structures and GI   tract is limited without oral and IV contrast. Evaluation for GI bleed is   limited as well.    FINDINGS:  LOWER CHEST: Mild bibasilar dependent atelectasis. Trace bilateral   pleural effusions. Coronary and aortic atherosclerosis.    LIVER: Hyperdense parenchyma.  BILE DUCTS: Normal caliber.  GALLBLADDER: Within normal limits.  SPLEEN: Within normal limits.  PANCREAS: Mild atrophy.  ADRENALS: Within normal limits.  KIDNEYS/URETERS: Right perinephric edema. No hydronephrosis. 1 cm right   lower pole indeterminate hypodensity. Completely atrophic left kidney   without hydronephrosis. Peripheral parenchymal chronic dystrophic   calcifications.    BLADDER: Distended measuring 13.1 cm sagittal.  REPRODUCTIVE ORGANS: Prostate within normal limits.    BOWEL: No bowel obstruction. Appendix is normal. Mild sigmoid   diverticulosis without diverticulitis.  PERITONEUM: No ascites.  VESSELS: Atherosclerotic changes. Possible stenotic right proximalrenal   artery.  RETROPERITONEUM/LYMPH NODES: No lymphadenopathy.  ABDOMINAL WALL: Within normal limits.  BONES: Degenerative changes. Grade 1 L5-S1 anterolisthesis.    IMPRESSION:  Hyperdense liver parenchyma. Possible iron deposition disease.    Nonspecific right perinephric edema without hydronephrosis. Consider   pyelonephritis.    Mild sigmoid diverticulosis without diverticulitis.    Possible right proximal renal artery stenosis.    < end of copied text >        ROS  [  ] UNABLE TO ELICIT               HPI:  Patient is a 89 year old male from home, who ambulates independently, with PMH of paroxysmal atrial fibrillation, HTN, HLD, CKD and BPH who was sent by the rehab center for altered mental status and fall in the nursing home. Patient is evaluated at bedside and is awake (AAOx2 - baseline) who reports that he remembers falling yesterday after he was trying to get away from people at the rehab center. He reports he was trying to get away as he was being tied down to bed and there were a lot of people trying to hold him. Patient denies feeling  ..patient reported as confused and  trying to remove his knee immobilizer yesterday. This morning patient was noted to be with BP 75/47.'  Patient denies any recent fevers, chills, weakness, fatigue, malaise, headache, dizziness, lightheadedness, chest pain, palpitations, shortness of breath, cough, nausea, vomiting, abdominal pain, diarrhea, constipation, melena, hematochezia, dysuria, urinary frequency, or urgency. Patient denies any other complains at this time.  Of note: Patient was discharged 2 days ago from [facility]. Patient was admitted post fall and found to have fracture of left fibular head. Patient was also found to have new onset atrial fibrillation in last admission.  (24 Nov 2023 16:04)          History as above, asked to see this patient who presented from a rehab center after falling and sustaining a left leg fracture. He had developed altered mental status as well. He is currently in the ICU and intubated in Septic Shock.          PAST MEDICAL & SURGICAL HISTORY:  HTN (hypertension)      HLD (hyperlipidemia)      BPH (benign prostatic hyperplasia)      Paroxysmal atrial fibrillation      Chronic kidney disease (CKD)          penicillin (Hives)      Meds:  aspirin enteric coated 81 milliGRAM(s) Oral daily  cefepime   IVPB 2000 milliGRAM(s) IV Intermittent every 24 hours  chlorhexidine 0.12% Liquid 15 milliLiter(s) Oral Mucosa every 12 hours  chlorhexidine 2% Cloths 1 Application(s) Topical <User Schedule>  chlorhexidine 4% Liquid 1 Application(s) Topical <User Schedule>  fentaNYL    Injectable 50 MICROGram(s) IV Push once  fentaNYL   Infusion 1 MICROgram(s)/kG/Hr IV Continuous <Continuous>  heparin   Injectable 5000 Unit(s) SubCutaneous every 12 hours  influenza  Vaccine (HIGH DOSE) 0.7 milliLiter(s) IntraMuscular once  lactated ringers Bolus 1000 milliLiter(s) IV Bolus once  lactulose Syrup 20 Gram(s) Oral three times a day  norepinephrine Infusion 1 MICROgram(s)/kG/Min IV Continuous <Continuous>  pantoprazole  Injectable 40 milliGRAM(s) IV Push daily  phenylephrine    Infusion 0.3 MICROgram(s)/kG/Min IV Continuous <Continuous>  propofol Infusion 40 MICROgram(s)/kG/Min IV Continuous <Continuous>  sodium chloride 0.9% lock flush 10 milliLiter(s) IV Push every 1 hour PRN  vasopressin Infusion 0.04 Unit(s)/Min IV Continuous <Continuous>      SOCIAL HISTORY:  Smoker:  YES / NO        PACK YEARS:                         WHEN QUIT?  ETOH use:  YES / NO               FREQUENCY / QUANTITY:  Ilicit Drug use:  YES / NO  Occupation:  Assisted device use (Cane / Walker):  Live with:    FAMILY HISTORY:      VITALS:  Vital Signs Last 24 Hrs  T(C): 36.4 (27 Nov 2023 08:00), Max: 36.9 (26 Nov 2023 23:14)  T(F): 97.6 (27 Nov 2023 08:00), Max: 98.4 (26 Nov 2023 23:14)  HR: 109 (27 Nov 2023 12:18) (84 - 137)  BP: 97/77 (27 Nov 2023 10:15) (58/18 - 143/91)  BP(mean): 84 (27 Nov 2023 10:15) (31 - 86)  RR: 30 (27 Nov 2023 10:15) (20 - 35)  SpO2: 100% (27 Nov 2023 12:18) (60% - 100%)    Parameters below as of 27 Nov 2023 08:00  Patient On (Oxygen Delivery Method): ventilator    O2 Concentration (%): 100    LABS/DIAGNOSTIC TESTS:                          7.8    10.49 )-----------( 279      ( 26 Nov 2023 08:50 )             25.2     WBC Count: 10.49 K/uL (11-26 @ 08:50)  WBC Count: 10.35 K/uL (11-25 @ 08:10)      11-26    146<H>  |  115<H>  |  66<H>  ----------------------------<  141<H>  4.2   |  24  |  3.26<H>    Ca    9.6      26 Nov 2023 08:50  Phos  4.0     11-26  Mg     2.0     11-26        Urinalysis + Microscopic Examination (11.24.23 @ 12:44)   pH Urine: 5.5  Urine Appearance: Clear  Color: Yellow  Specific Gravity: 1.015  Protein, Urine: Trace mg/dL  Glucose Qualitative, Urine: Negative mg/dL  Ketone - Urine: Negative mg/dL  Blood, Urine: Moderate  Bilirubin: Negative  Urobilinogen: 0.2 E.U./dL  Leukocyte Esterase Concentration: Negative  Nitrite: Negative  White Blood Cell - Urine: 2-6 /HPF  Red Blood Cell - Urine: 5-10 /HPF  Bacteria: Few /HPF  Squamous Epithelial Cells: None Seen            LACTATE:    ABG - ABG - ( 27 Nov 2023 10:38 )  pH, Arterial: 7.13  pH, Blood: x     /  pCO2: 27    /  pO2: 319   / HCO3: 9     / Base Excess: -18.7 /  SaO2: 100                 CULTURES:   .Blood Blood-Venous  11-25 @ 08:10   No growth at 24 hours  --  --      .Blood Blood  11-25 @ 08:05   No growth at 24 hours  --  --      Clean Catch Clean Catch (Midstream)  11-24 @ 12:44   No growth  --  --          RADIOLOGY:< from: Xray Knee 1 or 2 Views, Left (11.26.23 @ 11:48) >  ACC: [id number] EXAM:  XR KNEE 1-2 VIEWS LT   ORDERED BY: JOHN GARCIA     PROCEDURE DATE:  11/26/2023          INTERPRETATION:  Left knee. 2 views.    Prior imaging showed a fracture of the left fibular head.    IMPRESSION examination splint applied.    Small knee effusion noted.    There is a fracture difficult to assess.    IMPRESSION: Splinting.    --- End of Report ---            JOSE ALVAREZ MD; Attending Radiologist  This document has been electronically signed. Nov 26 2023  1:19PM    < end of copied text >  -----------------------------------------------------------------------------------------------------------------------------------------------------------------  ACC: [id number] EXAM:  XR CHEST PORTABLE URGENT 1V   ORDERED BY: FRANK MORROW     PROCEDURE DATE:  11/24/2023          INTERPRETATION:  HISTORY:  Admitting Dxs: N17.9 ACUTE KIDNEY FAILURE,   UNSPECIFIED; AMS;  TECHNIQUE: Portable frontal view of the chest, 1 view.  COMPARISON none.  FINDINGS/  IMPRESSION:    HEART:difficult to access in this projection.  LUNGS: There is a linear opacity at the left base, etiology unclear.   Consider CT chest. Right lung is clear..  BONES: degenerative changes    --- End of Report ---            MADISON COSTA MD; Attending Interventional Radiologist  This document has been electronically signed. Nov 25 2023  7:15AM    < end of copied text >  ------------------------------------------------------------------------------------------------------------------------------------------------------------------------------------------  ACC: [id number] EXAM:  CT ABDOMEN AND PELVIS   ORDERED BY: EDUAR KNOTT     PROCEDURE DATE:  11/21/2023          INTERPRETATION:  CLINICAL INFORMATION: 89 years  Male with Pt w anemia a   hx of melena. Assess for GI bleed.    COMPARISON: None.    CONTRAST/COMPLICATIONS:  IV Contrast: NONE  Oral Contrast: NONE  Complications: None reported at time of study completion    PROCEDURE:  CT of the Abdomen and Pelvis was performed.  Sagittal and coronal reformats were performed.    LIMITATIONS: Evaluation of the solid organs, vascular structures and GI   tract is limited without oral and IV contrast. Evaluation for GI bleed is   limited as well.    FINDINGS:  LOWER CHEST: Mild bibasilar dependent atelectasis. Trace bilateral   pleural effusions. Coronary and aortic atherosclerosis.    LIVER: Hyperdense parenchyma.  BILE DUCTS: Normal caliber.  GALLBLADDER: Within normal limits.  SPLEEN: Within normal limits.  PANCREAS: Mild atrophy.  ADRENALS: Within normal limits.  KIDNEYS/URETERS: Right perinephric edema. No hydronephrosis. 1 cm right   lower pole indeterminate hypodensity. Completely atrophic left kidney   without hydronephrosis. Peripheral parenchymal chronic dystrophic   calcifications.    BLADDER: Distended measuring 13.1 cm sagittal.  REPRODUCTIVE ORGANS: Prostate within normal limits.    BOWEL: No bowel obstruction. Appendix is normal. Mild sigmoid   diverticulosis without diverticulitis.  PERITONEUM: No ascites.  VESSELS: Atherosclerotic changes. Possible stenotic right proximalrenal   artery.  RETROPERITONEUM/LYMPH NODES: No lymphadenopathy.  ABDOMINAL WALL: Within normal limits.  BONES: Degenerative changes. Grade 1 L5-S1 anterolisthesis.    IMPRESSION:  Hyperdense liver parenchyma. Possible iron deposition disease.    Nonspecific right perinephric edema without hydronephrosis. Consider   pyelonephritis.    Mild sigmoid diverticulosis without diverticulitis.    Possible right proximal renal artery stenosis.    < end of copied text >        ROS  [  ] UNABLE TO ELICIT               HPI:  Patient is a 89 year old male from home, who ambulates independently, with PMH of paroxysmal atrial fibrillation, HTN, HLD, CKD and BPH who was sent by the rehab center for altered mental status and fall in the nursing home. Patient is evaluated at bedside and is awake (AAOx2 - baseline) who reports that he remembers falling yesterday after he was trying to get away from people at the rehab center. He reports he was trying to get away as he was being tied down to bed and there were a lot of people trying to hold him. Patient denies feeling  ..patient reported as confused and  trying to remove his knee immobilizer yesterday. This morning patient was noted to be with BP 75/47.'  Patient denies any recent fevers, chills, weakness, fatigue, malaise, headache, dizziness, lightheadedness, chest pain, palpitations, shortness of breath, cough, nausea, vomiting, abdominal pain, diarrhea, constipation, melena, hematochezia, dysuria, urinary frequency, or urgency. Patient denies any other complains at this time.  Of note: Patient was discharged 2 days ago from [facility]. Patient was admitted post fall and found to have fracture of left fibular head. Patient was also found to have new onset atrial fibrillation in last admission.  (24 Nov 2023 16:04)          History as above, asked to see this patient who presented from a rehab center after falling and sustaining a left leg fracture. He had developed altered mental status as well. He is currently in the ICU and intubated in Septic Shock. He is suspected to have aspiration Pneumonia and was hypoxic and very hypotensive. He is currently on 3 pressors and still hypotensive. He is Vent dependent on a FIO2 of 60% and PEEP of 5 , he is very tachycardic and has very poor urine output.          PAST MEDICAL & SURGICAL HISTORY:  HTN (hypertension)      HLD (hyperlipidemia)      BPH (benign prostatic hyperplasia)      Paroxysmal atrial fibrillation      Chronic kidney disease (CKD)          penicillin (Hives)      Meds:  aspirin enteric coated 81 milliGRAM(s) Oral daily  cefepime   IVPB 2000 milliGRAM(s) IV Intermittent every 24 hours  chlorhexidine 0.12% Liquid 15 milliLiter(s) Oral Mucosa every 12 hours  chlorhexidine 2% Cloths 1 Application(s) Topical <User Schedule>  chlorhexidine 4% Liquid 1 Application(s) Topical <User Schedule>  fentaNYL    Injectable 50 MICROGram(s) IV Push once  fentaNYL   Infusion 1 MICROgram(s)/kG/Hr IV Continuous <Continuous>  heparin   Injectable 5000 Unit(s) SubCutaneous every 12 hours  influenza  Vaccine (HIGH DOSE) 0.7 milliLiter(s) IntraMuscular once  lactated ringers Bolus 1000 milliLiter(s) IV Bolus once  lactulose Syrup 20 Gram(s) Oral three times a day  norepinephrine Infusion 1 MICROgram(s)/kG/Min IV Continuous <Continuous>  pantoprazole  Injectable 40 milliGRAM(s) IV Push daily  phenylephrine    Infusion 0.3 MICROgram(s)/kG/Min IV Continuous <Continuous>  propofol Infusion 40 MICROgram(s)/kG/Min IV Continuous <Continuous>  sodium chloride 0.9% lock flush 10 milliLiter(s) IV Push every 1 hour PRN  vasopressin Infusion 0.04 Unit(s)/Min IV Continuous <Continuous>      SOCIAL HISTORY: unknown    FAMILY HISTORY: unknown      VITALS:  Vital Signs Last 24 Hrs  T(C): 36.4 (27 Nov 2023 08:00), Max: 36.9 (26 Nov 2023 23:14)  T(F): 97.6 (27 Nov 2023 08:00), Max: 98.4 (26 Nov 2023 23:14)  HR: 109 (27 Nov 2023 12:18) (84 - 137)  BP: 97/77 (27 Nov 2023 10:15) (58/18 - 143/91)  BP(mean): 84 (27 Nov 2023 10:15) (31 - 86)  RR: 30 (27 Nov 2023 10:15) (20 - 35)  SpO2: 100% (27 Nov 2023 12:18) (60% - 100%)    Parameters below as of 27 Nov 2023 08:00  Patient On (Oxygen Delivery Method): ventilator    O2 Concentration (%): 100    LABS/DIAGNOSTIC TESTS:                          7.8    10.49 )-----------( 279      ( 26 Nov 2023 08:50 )             25.2     WBC Count: 10.49 K/uL (11-26 @ 08:50)  WBC Count: 10.35 K/uL (11-25 @ 08:10)      11-26    146<H>  |  115<H>  |  66<H>  ----------------------------<  141<H>  4.2   |  24  |  3.26<H>    Ca    9.6      26 Nov 2023 08:50  Phos  4.0     11-26  Mg     2.0     11-26        Urinalysis + Microscopic Examination (11.24.23 @ 12:44)   pH Urine: 5.5  Urine Appearance: Clear  Color: Yellow  Specific Gravity: 1.015  Protein, Urine: Trace mg/dL  Glucose Qualitative, Urine: Negative mg/dL  Ketone - Urine: Negative mg/dL  Blood, Urine: Moderate  Bilirubin: Negative  Urobilinogen: 0.2 E.U./dL  Leukocyte Esterase Concentration: Negative  Nitrite: Negative  White Blood Cell - Urine: 2-6 /HPF  Red Blood Cell - Urine: 5-10 /HPF  Bacteria: Few /HPF  Squamous Epithelial Cells: None Seen            LACTATE:    ABG - ABG - ( 27 Nov 2023 10:38 )  pH, Arterial: 7.13  pH, Blood: x     /  pCO2: 27    /  pO2: 319   / HCO3: 9     / Base Excess: -18.7 /  SaO2: 100                 CULTURES:   .Blood Blood-Venous  11-25 @ 08:10   No growth at 24 hours  --  --      .Blood Blood  11-25 @ 08:05   No growth at 24 hours  --  --      Clean Catch Clean Catch (Midstream)  11-24 @ 12:44   No growth  --  --          RADIOLOGY:< from: Xray Knee 1 or 2 Views, Left (11.26.23 @ 11:48) >  ACC: [id number] EXAM:  XR KNEE 1-2 VIEWS LT   ORDERED BY: JOHN GARCIA     PROCEDURE DATE:  11/26/2023          INTERPRETATION:  Left knee. 2 views.    Prior imaging showed a fracture of the left fibular head.    IMPRESSION examination splint applied.    Small knee effusion noted.    There is a fracture difficult to assess.    IMPRESSION: Splinting.    --- End of Report ---            JOSE ALVAREZ MD; Attending Radiologist  This document has been electronically signed. Nov 26 2023  1:19PM    < end of copied text >  -----------------------------------------------------------------------------------------------------------------------------------------------------------------  ACC: [id number] EXAM:  XR CHEST PORTABLE URGENT 1V   ORDERED BY: FRANK MORROW     PROCEDURE DATE:  11/24/2023          INTERPRETATION:  HISTORY:  Admitting Dxs: N17.9 ACUTE KIDNEY FAILURE,   UNSPECIFIED; AMS;  TECHNIQUE: Portable frontal view of the chest, 1 view.  COMPARISON none.  FINDINGS/  IMPRESSION:    HEART:difficult to access in this projection.  LUNGS: There is a linear opacity at the left base, etiology unclear.   Consider CT chest. Right lung is clear..  BONES: degenerative changes    --- End of Report ---            MADISON COSTA MD; Attending Interventional Radiologist  This document has been electronically signed. Nov 25 2023  7:15AM    < end of copied text >  ------------------------------------------------------------------------------------------------------------------------------------------------------------------------------------------  ACC: [id number] EXAM:  CT ABDOMEN AND PELVIS   ORDERED BY: EDUAR KNOTT     PROCEDURE DATE:  11/21/2023          INTERPRETATION:  CLINICAL INFORMATION: 89 years  Male with Pt w anemia a   hx of melena. Assess for GI bleed.    COMPARISON: None.    CONTRAST/COMPLICATIONS:  IV Contrast: NONE  Oral Contrast: NONE  Complications: None reported at time of study completion    PROCEDURE:  CT of the Abdomen and Pelvis was performed.  Sagittal and coronal reformats were performed.    LIMITATIONS: Evaluation of the solid organs, vascular structures and GI   tract is limited without oral and IV contrast. Evaluation for GI bleed is   limited as well.    FINDINGS:  LOWER CHEST: Mild bibasilar dependent atelectasis. Trace bilateral   pleural effusions. Coronary and aortic atherosclerosis.    LIVER: Hyperdense parenchyma.  BILE DUCTS: Normal caliber.  GALLBLADDER: Within normal limits.  SPLEEN: Within normal limits.  PANCREAS: Mild atrophy.  ADRENALS: Within normal limits.  KIDNEYS/URETERS: Right perinephric edema. No hydronephrosis. 1 cm right   lower pole indeterminate hypodensity. Completely atrophic left kidney   without hydronephrosis. Peripheral parenchymal chronic dystrophic   calcifications.    BLADDER: Distended measuring 13.1 cm sagittal.  REPRODUCTIVE ORGANS: Prostate within normal limits.    BOWEL: No bowel obstruction. Appendix is normal. Mild sigmoid   diverticulosis without diverticulitis.  PERITONEUM: No ascites.  VESSELS: Atherosclerotic changes. Possible stenotic right proximalrenal   artery.  RETROPERITONEUM/LYMPH NODES: No lymphadenopathy.  ABDOMINAL WALL: Within normal limits.  BONES: Degenerative changes. Grade 1 L5-S1 anterolisthesis.    IMPRESSION:  Hyperdense liver parenchyma. Possible iron deposition disease.    Nonspecific right perinephric edema without hydronephrosis. Consider   pyelonephritis.    Mild sigmoid diverticulosis without diverticulitis.    Possible right proximal renal artery stenosis.    < end of copied text >        ROS  [ x ] UNABLE TO ELICIT

## 2023-11-27 NOTE — CONSULT NOTE ADULT - SUBJECTIVE AND OBJECTIVE BOX
Patient is a 89y old  Male who presents with a chief complaint of Acute Metabolic Encephalopathy and Falls (26 Nov 2023 11:00)      HPI:  Patient is a 89 year old male from home, who ambulates independently, with PMH of paroxysmal atrial fibrillation, HTN, HLD, CKD and BPH who was sent by the rehab center for altered mental status and fall in the nursing home. Patient is evaluated at bedside and is awake (AAOx2 - baseline) who reports that he remembers falling yesterday after he was trying to get away from people at the rehab center. He reports he was trying to get away as he was being tied down to bed and there were a lot of people trying to hold him. Patient denies feeling  ..patient reported as confused and  trying to remove his knee immobilizer yesterday. This morning patient was noted to be with BP 75/47.'  Patient denies any recent fevers, chills, weakness, fatigue, malaise, headache, dizziness, lightheadedness, chest pain, palpitations, shortness of breath, cough, nausea, vomiting, abdominal pain, diarrhea, constipation, melena, hematochezia, dysuria, urinary frequency, or urgency. Patient denies any other complains at this time.  Of note: Patient was discharged 2 days ago from [facility]. Patient was admitted post fall and found to have fracture of left fibular head. Patient was also found to have new onset atrial fibrillation in last admission.  (24 Nov 2023 16:04)    On admission, patient was noted to have VAMSI/ATN and Hyperammonemia. He was started on Lactulose. On 11/27/23, RRT was called as RN noted that patient was AMS with associated hypoxia (SO2 70-80s), hypotension (SBP 70s). Auscultation revealed bilateral crackles and rhonchi. Suspected Aspiration event. Given IVF bolus and started on peripheral levophed. Subsequently intubated for airway protection.        PAST MEDICAL & SURGICAL HISTORY:  HTN (hypertension)      HLD (hyperlipidemia)      BPH (benign prostatic hyperplasia)      Paroxysmal atrial fibrillation      Chronic kidney disease (CKD)          SOCIAL HX:   Smoking                         ETOH                            Other    FAMILY HISTORY:  :  No known cardiovacular family hisotry     ROS:  See HPI     Allergies    penicillin (Hives)    Intolerances          PHYSICAL EXAM    ICU Vital Signs Last 24 Hrs  T(C): 36.8 (27 Nov 2023 04:45), Max: 36.9 (26 Nov 2023 23:14)  T(F): 98.2 (27 Nov 2023 04:45), Max: 98.4 (26 Nov 2023 23:14)  HR: 102 (27 Nov 2023 04:45) (72 - 111)  BP: 139/76 (27 Nov 2023 04:45) (102/57 - 143/91)  BP(mean): 64 (26 Nov 2023 19:47) (64 - 77)  ABP: --  ABP(mean): --  RR: 20 (27 Nov 2023 04:45) (19 - 20)  SpO2: 60% (27 Nov 2023 06:20) (60% - 97%)    O2 Parameters below as of 27 Nov 2023 04:45  Patient On (Oxygen Delivery Method): room air            General: Not in distress  HEENT:  3-4 mm reactive to light on OD, 4-5 mm sluggishly reactive to light on OS              Lymphatic system: No LN  Lungs: (+) bilateral crackles and rhonchi  Cardiovascular: Regular  Gastrointestinal: Soft, Positive BS  Neuro: (+) left-sided facial droop, pupils unequally reactive to light  Musculoskeletal: No clubbing.  Moves all extremities.    Skin: Warm.  Intact  Neurological: No motor or sensory deficit       11-25-23 @ 07:01  -  11-26-23 @ 07:00  --------------------------------------------------------  IN:  Total IN: 0 mL    OUT:    Voided (mL): 500 mL  Total OUT: 500 mL    Total NET: -500 mL      11-26-23 @ 07:01  -  11-27-23 @ 06:39  --------------------------------------------------------  IN:    Lactated Ringers: 750 mL  Total IN: 750 mL    OUT:    Voided (mL): 175 mL  Total OUT: 175 mL    Total NET: 575 mL          LABS:                          7.8    10.49 )-----------( 279      ( 26 Nov 2023 08:50 )             25.2                                               11-26    146<H>  |  115<H>  |  66<H>  ----------------------------<  141<H>  4.2   |  24  |  3.26<H>    Ca    9.6      26 Nov 2023 08:50  Phos  4.0     11-26  Mg     2.0     11-26    TPro  5.5<L>  /  Alb  2.5<L>  /  TBili  0.6  /  DBili  0.2  /  AST  30  /  ALT  29  /  AlkPhos  48  11-25      PT/INR - ( 25 Nov 2023 08:10 )   PT: 12.4 sec;   INR: 1.09 ratio         PTT - ( 25 Nov 2023 08:10 )  PTT:26.5 sec                                       Urinalysis Basic - ( 26 Nov 2023 08:50 )    Color: x / Appearance: x / SG: x / pH: x  Gluc: 141 mg/dL / Ketone: x  / Bili: x / Urobili: x   Blood: x / Protein: x / Nitrite: x   Leuk Esterase: x / RBC: x / WBC x   Sq Epi: x / Non Sq Epi: x / Bacteria: x        CARDIAC MARKERS ( 25 Nov 2023 08:10 )  x     / x     / 448 U/L / x     / x                                                LIVER FUNCTIONS - ( 25 Nov 2023 08:10 )  Alb: 2.5 g/dL / Pro: 5.5 g/dL / ALK PHOS: 48 U/L / ALT: 29 U/L DA / AST: 30 U/L / GGT: x                                                  Culture - Blood (collected 25 Nov 2023 08:10)  Source: .Blood Blood-Venous  Preliminary Report (26 Nov 2023 16:01):    No growth at 24 hours    Culture - Blood (collected 25 Nov 2023 08:05)  Source: .Blood Blood  Preliminary Report (26 Nov 2023 16:01):    No growth at 24 hours    Culture - Urine (collected 24 Nov 2023 12:44)  Source: Clean Catch Clean Catch (Midstream)  Final Report (25 Nov 2023 16:17):    No growth                                                   Mode: AC/ CMV (Assist Control/ Continuous Mandatory Ventilation)  RR (machine): 12  TV (machine): 450  FiO2: 100  PEEP: 5  ITime: 0.9  MAP: 10  PIP: 22                                          CXR:    ECHO:    MEDICATIONS  (STANDING):  ascorbic acid 500 milliGRAM(s) Oral daily  aspirin enteric coated 81 milliGRAM(s) Oral daily  atorvastatin 40 milliGRAM(s) Oral at bedtime  chlorhexidine 0.12% Liquid 15 milliLiter(s) Oral Mucosa every 12 hours  chlorhexidine 2% Cloths 1 Application(s) Topical <User Schedule>  etomidate Injectable 20 milliGRAM(s) IV Push once  heparin   Injectable 5000 Unit(s) SubCutaneous every 12 hours  influenza  Vaccine (HIGH DOSE) 0.7 milliLiter(s) IntraMuscular once  lactated ringers. 1000 milliLiter(s) (75 mL/Hr) IV Continuous <Continuous>  lactulose Syrup 20 Gram(s) Oral three times a day  metoprolol tartrate 12.5 milliGRAM(s) Oral every 12 hours  norepinephrine Infusion 0.05 MICROgram(s)/kG/Min (7.78 mL/Hr) IV Continuous <Continuous>  piperacillin/tazobactam IVPB. 3.375 Gram(s) IV Intermittent once  piperacillin/tazobactam IVPB.- 3.375 Gram(s) IV Intermittent once  tamsulosin 0.4 milliGRAM(s) Oral at bedtime    MEDICATIONS  (PRN):  acetaminophen     Tablet .. 650 milliGRAM(s) Oral every 6 hours PRN Temp greater or equal to 38C (100.4F), Mild Pain (1 - 3)  melatonin 3 milliGRAM(s) Oral at bedtime PRN Insomnia  OLANZapine Injectable 2.5 milliGRAM(s) IntraMuscular every 12 hours PRN agitation  ondansetron Injectable 4 milliGRAM(s) IV Push every 8 hours PRN Nausea and/or Vomiting

## 2023-11-27 NOTE — PROGRESS NOTE ADULT - ASSESSMENT
89 year old male from home, who ambulates independently, with PMH of paroxysmal atrial fibrillation, HTN, HLD, CKD and BPH who was sent by the rehab center for altered mental status and fall in the nursing home. Patient is being admitted for further management of acute metabolic encephalopathy, recurrent fall and VAMSI. Found to have elevated ammonia. Admitted to ICU for AHRF, septic shock 2/2 Aspiration Pneumonia.    #AHRF  #Septic Shock  #Aspiration Pneumonia  #Acute Metabolic Encephalopathy  #CVA  #VAMSI/ATN  #Hyperammonenia  #PAF  #HTN  #BPH    _________CNS___________    #Acute Metabolic Encephalopathy  Pt sent from NH for acute agitation and fall.   - Likely episode of delirium  - Might be 2/2 to worsening dementia vs uremia vs other electrolyte abnormality vs infection vs urinary retention.   - HEAD CT:  No evidence of an acute traumatic intracranial injury.  - CERVICAL SPINE CT:  No evidence of an acute cervical spine fracture.  - Ammonia level is 129   - c/w lactulose.   - Consulted neurology - Dr. Terrell.  -Utox  -SAT after CTH    #CVA  - transferred to ICU for AMS  - noted to have unequal pupils (dilated and sluggish L pupil) and L facial droop  - f/u CT Head    #HTN  - Pt w pmh of HTN on home med metoprolol  - hold in the setting of shock    _________CVS___________    #Septic Shock  - noted to have low BP  - likely due to Aspiration Pneumonia  - started on IV Zosyn  - BCx (11/25) - NGTD  - UA - neg  - f/u BCx, Sputum  - ID Dr. Falguni campoulted    #Atrial Fibrillation  - Found to have new onset atrial fibrillation during last admission.   - Discharged on digoxin, metoprolol.   - Hold digoxin until digoxin level result (due to VAMSI).   - hold metoprolol.   - Consulted cardio - Dr. Valencia.    _________ RESP__________    #AHRF  #Aspiration Pneumonia  - transferred to ICU for hypoxia, AMS  - continue mechanical ventilation - 12/450/100/5  - f/u ET aspirates, ABG  - ABG 7.13|27|319|9|  __________GI____________    #Hyperammonemia  - NH4 - 129  - continue lactulose  - monitor BM    ________ RENAL__________    #VAMSI/ATN  - Pt p/w Cr of 4.21 (unknown baseline but discharged with Cr of 1.37)  - Bladder scan - no urine  - Cr has remained elevated w/o improvement despite fluids, may likely be ATN in s/o significant dehydrations at rehab facility   - Avoid nephrotoxic agents   - c/w fluids  - monitor BMP  - monitor urine output  - Nephro (Dr. Reyan) consulted  - f/u urine studies  #BPH  - Pt w pmh of BPH on home med tamsulosin  - c/w home med.    _________MSK___________    #no issues    __________ID____________    #Septic Shock  #Aspiration Pneumonia  - see above    _________ENDO__________    #no issues    _______HEME/ONC_______    #Anemia  - Pt w Macrocytic Anemia  - hg is 7.3 > 7.8  - .6 >105  - F/U B12, homocystine and methylmalonic acid levels.    _________SKIN____________    #no issues    ________Prophylaxis_______    #DVT - Heparin SQ  #GI - IV PPI     __________GOC/ DISPO___________    NO ESCALATION OF CARE DNR/DNI     89 year old male from home, who ambulates independently, with PMH of paroxysmal atrial fibrillation, HTN, HLD, CKD and BPH who was sent by the rehab center for altered mental status and fall in the nursing home. Patient is being admitted for further management of acute metabolic encephalopathy, recurrent fall and VAMSI. Found to have elevated ammonia. Admitted to ICU for AHRF, septic shock 2/2 Aspiration Pneumonia.    #AHRF  #Septic Shock  #Aspiration Pneumonia  #Acute Metabolic Encephalopathy  #CVA  #VAMSI/ATN  #Hyperammonenia  #PAF  #HTN  #BPH    _________CNS___________    #Acute Metabolic Encephalopathy  Pt sent from NH for acute agitation and fall.   - Likely episode of delirium  - Might be 2/2 to worsening dementia vs uremia vs other electrolyte abnormality vs infection vs urinary retention.   - HEAD CT:  No evidence of an acute traumatic intracranial injury.  - CERVICAL SPINE CT:  No evidence of an acute cervical spine fracture.  - Ammonia level is 129   - c/w lactulose.   - Consulted neurology - Dr. Terrell.  -Utox  -SAT after CTH    #CVA  - transferred to ICU for AMS  - noted to have unequal pupils (dilated and sluggish L pupil) and L facial droop  - f/u CT Head    #HTN  - Pt w pmh of HTN on home med metoprolol  - hold in the setting of shock    _________CVS___________    #Septic Shock  - noted to have low BP  - likely due to Aspiration Pneumonia  - started on IV Zosyn  - BCx (11/25) - NGTD  - UA - neg  - f/u BCx, Sputum  - ID Dr. Falguni campoulted    #Atrial Fibrillation  - Found to have new onset atrial fibrillation during last admission.   - Discharged on digoxin, metoprolol.   - Hold digoxin until digoxin level result (due to VAMSI).   - hold metoprolol.   - Consulted cardio - Dr. Valencia.    _________ RESP__________    #AHRF  #Aspiration Pneumonia  - transferred to ICU for hypoxia, AMS  - continue mechanical ventilation - 12/450/100/5  - f/u ET aspirates, ABG  - ABG 7.13|27|319|9|  __________GI____________    #Hyperammonemia  - NH4 - 129  - continue lactulose  - monitor BM    ________ RENAL__________    #VAMSI/ATN  - Pt p/w Cr of 4.21 (unknown baseline but discharged with Cr of 1.37)  - Bladder scan - no urine  - Cr has remained elevated w/o improvement despite fluids, may likely be ATN in s/o significant dehydrations at rehab facility   - Avoid nephrotoxic agents   - c/w fluids  - monitor BMP  - monitor urine output  - Nephro (Dr. Reyna) consulted  - f/u urine studies  #BPH  - Pt w pmh of BPH on home med tamsulosin  - c/w home med.    #lactic acidosis  lactate 18.5    _________MSK___________    #no issues    __________ID____________    #Septic Shock  #Aspiration Pneumonia  - see above    _________ENDO__________    #no issues    _______HEME/ONC_______    #Anemia  - Pt w Macrocytic Anemia  - hg is 7.3 > 7.8  - .6 >105  - F/U B12, homocystine and methylmalonic acid levels.    _________SKIN____________    #no issues    ________Prophylaxis_______    #DVT - Heparin SQ  #GI - IV PPI     __________GOC/ DISPO___________    NO ESCALATION OF CARE DNR/DNI     89 year old male from home, who ambulates independently, with PMH of paroxysmal atrial fibrillation, HTN, HLD, CKD and BPH who was sent by the rehab center for altered mental status and fall in the nursing home. Patient is being admitted for further management of acute metabolic encephalopathy, recurrent fall and VAMSI. Found to have elevated ammonia. Admitted to ICU for AHRF, septic shock 2/2 Aspiration Pneumonia.    #AHRF  #Septic Shock  #Aspiration Pneumonia  #Acute Metabolic Encephalopathy  #CVA  #VAMSI/ATN  #Hyperammonenia  #PAF  #HTN  #BPH    _________CNS___________    #Acute Metabolic Encephalopathy  Pt sent from NH for acute agitation and fall.   - Likely episode of delirium  - Might be 2/2 to worsening dementia vs uremia vs other electrolyte abnormality vs infection vs urinary retention.   - HEAD CT:  No evidence of an acute traumatic intracranial injury.  - CERVICAL SPINE CT:  No evidence of an acute cervical spine fracture.  - Ammonia level is 129   - c/w lactulose.   - Consulted neurology - Dr. Terrell.  -Utox  -SAT after CTH    #CVA  - transferred to ICU for AMS  - noted to have unequal pupils (dilated and sluggish L pupil) and L facial droop  - f/u CT Head    #HTN  - Pt w pmh of HTN on home med metoprolol  - hold in the setting of shock    _________CVS___________    #Septic Shock  - noted to have low BP  - likely due to Aspiration Pneumonia  - started on IV Zosyn  - BCx (11/25) - NGTD  - UA - neg  - f/u BCx, Sputum  - ID Dr. Falguni campoulted    #Atrial Fibrillation  - Found to have new onset atrial fibrillation during last admission.   - Discharged on digoxin, metoprolol.   - Hold digoxin until digoxin level result (due to VAMSI).   - hold metoprolol.   - Consulted cardio - Dr. Valencia.    _________ RESP__________    #AHRF  #Aspiration Pneumonia  - transferred to ICU for hypoxia, AMS  - continue mechanical ventilation - 12/450/100/5  - f/u ET aspirates, ABG  - ABG 7.13|27|319|9|  __________GI____________    #Hyperammonemia  - NH4 - 129  - continue lactulose  - monitor BM    ________ RENAL__________    #VAMSI/ATN  - Pt p/w Cr of 4.21 (unknown baseline but discharged with Cr of 1.37)  - Bladder scan - no urine  - Cr has remained elevated w/o improvement despite fluids, may likely be ATN in s/o significant dehydrations at rehab facility   - Avoid nephrotoxic agents   - c/w fluids  - monitor BMP  - monitor urine output  - Nephro (Dr. Reyna) consulted  - f/u urine studies  #BPH  - Pt w pmh of BPH on home med tamsulosin  - c/w home med.    #lactic acidosis  lactate 18.5    _________MSK___________    #no issues    __________ID____________    #Septic Shock  #Aspiration Pneumonia  - see above    _________ENDO__________    #no issues    _______HEME/ONC_______    #Anemia  - Pt w Macrocytic Anemia  - hg is 7.3 > 7.8  - .6 >105  - F/U B12, homocystine and methylmalonic acid levels.    _________SKIN____________    #no issues    ________Prophylaxis_______    #DVT - Heparin SQ  #GI - IV PPI     __________GOC/ DISPO___________    NO ESCALATION OF CARE  89 year old male from home, who ambulates independently, with PMH of paroxysmal atrial fibrillation, HTN, HLD, CKD and BPH who was sent by the rehab center for altered mental status and fall in the nursing home. Patient is being admitted for further management of acute metabolic encephalopathy, recurrent fall and VAMSI. Found to have elevated ammonia. Admitted to ICU for AHRF, septic shock 2/2 Aspiration Pneumonia.    #AHRF  #Septic Shock  #Aspiration Pneumonia  #Acute Metabolic Encephalopathy  #CVA  #VAMSI/ATN  #Hyperammonenia  #PAF  #HTN  #BPH    _________CNS___________    #Acute Metabolic Encephalopathy  Pt sent from NH for acute agitation and fall.   - Likely episode of delirium  - Might be 2/2 to worsening dementia vs uremia vs other electrolyte abnormality vs infection vs urinary retention.   - HEAD CT:  No evidence of an acute traumatic intracranial injury.  - CERVICAL SPINE CT:  No evidence of an acute cervical spine fracture.  - Ammonia level is 129   - c/w lactulose.   - Consulted neurology - Dr. Terrell.  -Utox  -SAT after CTH    #CVA  - transferred to ICU for AMS  - noted to have unequal pupils (dilated and sluggish L pupil) and L facial droop  - f/u CT Head    #HTN  - Pt w pmh of HTN on home med metoprolol  - hold in the setting of shock    _________CVS___________    #Septic Shock  - noted to have low BP  - likely due to Aspiration Pneumonia  - started on IV Zosyn  - BCx (11/25) - NGTD  - UA - neg  - f/u BCx, Sputum  - ID Dr. Falguni campoulted    #Atrial Fibrillation  - Found to have new onset atrial fibrillation during last admission.   - Discharged on digoxin, metoprolol.   - Hold digoxin until digoxin level result (due to VAMSI).   - hold metoprolol.   - Consulted cardio - Dr. Valencia.    _________ RESP__________    #AHRF  #Aspiration Pneumonia  - transferred to ICU for hypoxia, AMS  - continue mechanical ventilation - 12/450/100/5  - f/u ET aspirates, ABG  - ABG 7.13|27|319|9|  __________GI____________    #Hyperammonemia  - NH4 - 129  - continue lactulose  - monitor BM    ________ RENAL__________    #VAMSI/ATN  - Pt p/w Cr of 4.21 (unknown baseline but discharged with Cr of 1.37)  - Bladder scan - no urine  - Cr has remained elevated w/o improvement despite fluids, may likely be ATN in s/o significant dehydrations at rehab facility   - Avoid nephrotoxic agents   - c/w fluids  - monitor BMP  - monitor urine output  - Nephro (Dr. Reyna) consulted  - f/u urine studies  #BPH  - Pt w pmh of BPH on home med tamsulosin  - c/w home med.    #lactic acidosis  lactate 18.5    _________MSK___________    #no issues    __________ID____________    #Septic Shock  #Aspiration Pneumonia  - see above    _________ENDO__________    #no issues    _______HEME/ONC_______    #Anemia  - Pt w Macrocytic Anemia  - hg is 7.3 > 7.8  - .6 >105  - F/U B12, homocystine and methylmalonic acid levels.    _________SKIN____________    #no issues    ________Prophylaxis_______    #DVT - Heparin SQ  #GI - IV PPI     __________GOC/ DISPO___________    NO ESCALATION OF CARE   DNR/DNI no tube feeds

## 2023-11-27 NOTE — CONSULT NOTE ADULT - PROVIDER SPECIALTY LIST ADULT
Infectious Disease
Nephrology
Neurology
Palliative Care
Cardiology
Critical Care
any worsening of symptoms call MD or go to ER

## 2023-11-27 NOTE — DISCHARGE NOTE FOR THE EXPIRED PATIENT - HOSPITAL COURSE
89 year old male from home, who ambulates independently, with PMH of paroxysmal atrial fibrillation, HTN, HLD, CKD and BPH who was sent by the rehab center for altered mental status and fall in the nursing home. Patient is being admitted for further management of acute metabolic encephalopathy, recurrent fall and VAMSI.     Admitted to ICU for AHRF, septic shock 2/2 Aspiration Pneumonia. Patient was intubated. Central line placed. Patient on Levophed, Vasopressin and Phenylepherine, still hypotensive. Patients living will was faxed in after patient was aready intubated, DNR DNI. Patients family decided on no escalation of care. Patient was made comfort care based on his living will.    Called by nurse about patient being unresponsive and in asystole. Patient seen at bedside. On examination, patient was unresponsive, bilateral pupils dilated, non-responsive to light, no bilateral conjunctival reflex present, no heart sounds auscultated, no spontaneous breath sounds present, no peripheral pulses present. EKG showed aystole. Patient pronounced dead at 07:26 pm hours on 11/27/2023. Attending, Dr. Richard, informed regarding patient’s status. Family present at bedside. Dr. Joshi informed patient’s family and condolences were offered.

## 2023-11-27 NOTE — CONSULT NOTE ADULT - PROBLEM SELECTOR RECOMMENDATION 4
Clinical evidence indicates that the patient has Severe protein calorie malnutrition/ 3rd degree. Albumin 2.5.    In context of     Acute Illness/Injury (>7days)    vs    Chronic Illness (>1 month)    Energy/Food intake <50% of estimated energy requirement >5 days  Weight loss: Moderate - severe (lbs lost recently)  Body Fat loss: Severe  muscle atrophy)  Muscle mass loss: Severe    Strength: weakened severe bedbound    Recommend:   pleasure feeds as tolerated - aspiration precautions, careful hand-feeding, teaching to caregivers  nutritional supplements as tolerated, nutrition consult    No feeding tube

## 2023-11-27 NOTE — RAPID RESPONSE TEAM SUMMARY - NSSITUATIONBACKGROUNDRRT_GEN_ALL_CORE
RRT was called as RN noted that patient was AMS with associated hypoxia (SO2 70-80s), hypotension (SBP 70s). Auscultation revealed bilateral crackles and rhonchi. Suspected Aspiration event. Given IVF bolus and started on peripheral levophed. Subsequently intubated for airway protection.

## 2023-11-27 NOTE — CONSULT NOTE ADULT - PROBLEM SELECTOR RECOMMENDATION 5
Hx of falls.  Bedbound 2/2 NWB due to left proximal fibula fracture.   High risk for skin failure.   Supportive care  Freq positioning    Comfort only

## 2023-11-27 NOTE — CONSULT NOTE ADULT - CONSULT REASON
Discuss complex medical decision making related to goals of care Discuss complex medical decision making related to goals of care advanced age with multiple comorbidities.

## 2023-11-27 NOTE — PROGRESS NOTE ADULT - ATTENDING COMMENTS
89 year old male from home, who ambulates independently, with PMH of paroxysmal atrial fibrillation, HTN, HLD, CKD and BPH who was sent by the rehab center for altered mental status and fall in the nursing home. Patient is being admitted for further management of acute metabolic encephalopathy, recurrent fall and VAMSI. Found to have elevated ammonia. Admitted to ICU for AHRF, septic shock 2/2 Aspiration Pneumonia.    #AHRF  #Septic Shock  #Aspiration Pneumonia  #Acute Metabolic Encephalopathy  #CVA  #VAMSI/ATN  #Hyperammonenia  #PAF  #HTN  #BPH  #Acute liver injury   #Multiple organ failure     Plan     - Continue sedation and pain meds for vent synchrony   - Vent support as per ABG   - Hemodynamic support   - Vaso-active agents titrate to maintain MAP>65  - Hold all antihypertensive meds due to shock   - Cultures   - Antibx for broad spect   - NPO   - PPI   - No clearing lactate due to acute liver injury   - Trend LFT   - LActulose   - Hepatology eval   prognosis poor
89 yo male with a PMHx of HTN, HLD, BPH, anemia, CKD stage 3 (creatinine 1.77 in outpt -July 2023), recent [facility] hospitalization for fall w/out syncope complicated by a non-displaced left fibular fracture (w/ recommendation for NWB to LLE), urinary retention which resolved, atrial fibrillation with RVR- managed with digoxin and metoprolol- discharged to [facility] referred back to the hospital for reported AMS and transient hypotension at [facility]. Pt described as wanting to remove his knee immobilizer and later sat himself on the ground- no reported head trauma. Per report by [facility] staff, he had a blood pressure of 75/47. Patient states he was being restrained and wanted to move about. BP on arrival to the ED noted as 133/67 with no intervention undertaken out in the field or the ED  to correct the blood pressure.     He denies sob, SOOD, suprapubic/abd pain, dysuria, fevers, chills, nausea/vomiting, sputum production. He reports occasional cough with eating. Pt endorses Left knee pain with movement/manipulation. During bedside discussion, patient noted AAOx2 ("11th month of the year, Friday , 24th 2023")..cited location as "hospital rehab- [facility]" which was status quo for level of orientation on the day of discharge and throughout most recent hospitalization. Nephew at bedside reports Mr. Boone appears to have been doing well around the time of the Thanksgiving dinner yesterday but states this is not the patient's baseline and he is previously "high functioning" and maintaining his own personal finances. He also expressed concerns that the patient may have hallucinated.    CT angio head/neck were unremarkable at the prior admission where he was also managed by cardiology. Was also assessed by hematology oncology for anemia and evaluated for multiple myeloma with negative blood electrophoresis serologies though urine returned as insufficient.  Anticoagulation deferred due to fall risk at recent hospitalization. Within the 48 hours prior to discharge, systolic BPs were in 110s to 140s.     1. metabolic encephalopathy  2. altered mental status-likely component of delirium  3. VAMSI on CKD stage 3  4. suspected urinary retention- based on bladder palpated  5. transient hypotension- at [facility], none documented  6. demand ischemia, elevated troponin  7. onset Afib w/ RVR- now rate controlled   8. left fibular fracture with immobilizer in place  9. anemia of chronic inflammation/ disease + macrocytosis  10. dysphagia?  11.HTN  12HLD  13BPH    -Suspect component of delirium, volume depletion, distended bladder concerning for urinary retention,  VAMSI on CKD. UA, CT cervical spine and head unremarkable  -was seen by neurology at bedside (Dr. Terrell- who discussed a possible LP, patient and nephew defer and would like to monitor progress)  -Plan to assess for infectious process- f/u blood cx, CPK   - Nephrology consulted -IVF hydration  -Dig level reviewed and normal, resume dig. Continue with beta blocker  - Check orthostatics  - Troponin reviewed downtrended  -check status of left fibular fracture with repeat xray for stability  -Patient had passed the 5ml and 20ml volume trial and failed the 90ml volume trial. Pt is not a code stroke activation. check speech and swallow consult,  -puree diet, thickened liquids with teaspoon feeds at bedside until formal speech and swallow consult  -c/w tamsolusin  -fall precautions, maintain immobilizer  -dvt ppx
89 yo male with a PMHx of HTN, HLD, BPH, anemia, CKD stage 3 (creatinine 1.77 in outpt -July 2023), recent [facility] hospitalization for fall w/out syncope complicated by a non-displaced left fibular fracture (w/ recommendation for NWB to LLE), urinary retention which resolved, atrial fibrillation with RVR- managed with digoxin and metoprolol- discharged to [facility] referred back to the hospital for reported AMS and transient hypotension at [facility]. Pt described as wanting to remove his knee immobilizer and later sat himself on the ground- no reported head trauma. Per report by [facility] staff, he had a blood pressure of 75/47. Patient states he was being restrained and wanted to move about. BP on arrival to the ED noted as 133/67 with no intervention undertaken out in the field or the ED  to correct the blood pressure.     He denies sob, SOOD, suprapubic/abd pain, dysuria, fevers, chills, nausea/vomiting, sputum production. He reports occasional cough with eating. Pt endorses Left knee pain with movement/manipulation. During bedside discussion, patient noted AAOx2 ("11th month of the year, Friday , 24th 2023")..cited location as "hospital rehab- [facility]" which was status quo for level of orientation on the day of discharge and throughout most recent hospitalization. Nephew at bedside reports Mr. Boone appears to have been doing well around the time of the Thanksgiving dinner yesterday but states this is not the patient's baseline and he is previously "high functioning" and maintaining his own personal finances. He also expressed concerns that the patient may have hallucinated.    CT angio head/neck were unremarkable at the prior admission where he was also managed by cardiology. Was also assessed by hematology oncology for anemia and evaluated for multiple myeloma with negative blood electrophoresis serologies though urine returned as insufficient.  Anticoagulation deferred due to fall risk at recent hospitalization. Within the 48 hours prior to discharge, systolic BPs were in 110s to 140s.     1. metabolic encephalopathy  2. altered mental status-likely component of delirium  3. VAMSI on CKD stage 3  4. suspected urinary retention- based on bladder palpated  5. transient hypotension- at [facility], none documented  6. demand ischemia, elevated troponin  7. onset Afib w/ RVR- now rate controlled   8. left fibular fracture with immobilizer in place  9. anemia of chronic inflammation/ disease + macrocytosis  10. dysphagia?  11.HTN  12HLD  13BPH    -Suspect component of delirium, volume depletion, distended bladder concerning for urinary retention,  VAMSI on CKD. UA, CT cervical spine and head unremarkable  -was seen by neurology at bedside (Dr. Terrell- who discussed a possible LP, patient and nephew defer and would like to monitor progress)  -  blood cx, CPK negative  - Nephrology consulted -IVF hydration   - Patient now without nevarez - This AM post void residual 300, Repeat PVR is 97 at 11AM and voided to the pad    - WILL continue to monitor bladder scan intermittently  -Dig level reviewed and normal, resume dig. Continue with beta blocker  - Troponin reviewed downtrended  - left fibular fracture stable on repeat xray  -Patient had passed the 5ml and 20ml volume trial and failed the 90ml volume trial. Pt is not a code stroke activation. check speech and swallow consult,  -puree diet, thickened liquids with teaspoon feeds at bedside until formal speech and swallow consult  -c/w tamsolusin  -fall precautions, maintain immobilizer  -dvt ppx

## 2023-11-27 NOTE — CONSULT NOTE ADULT - PROBLEM SELECTOR RECOMMENDATION 3
Likely pre-renal 2/2 poor po intake and dehydration.  W worsening Scr.  Nephrology following     Avoid nephrotoxic medications/NSAIDs

## 2023-11-27 NOTE — CONSULT NOTE ADULT - PROBLEM SELECTOR RECOMMENDATION 6
89 year old man from [place], who ambulates independently, with PMH of paroxysmal atrial fibrillation, HTN, HLD, CKD and BPH who was sent by the rehab center for altered mental status and fall in the nursing home. Patient is being admitted for further management of acute metabolic encephalopathy, recurrent fall and VAMSI. Found to have elevated ammonia. Admitted to ICU for AHRF, septic shock 2/2 Aspiration Pneumonia. Intubated/sedated on pressor support.  Poor prognosis.  Family and HCP agreed for liberation of ET and medications for symptom management and EOL care.   Please see discussion noted above in GOC conversation

## 2023-11-27 NOTE — CONSULT NOTE ADULT - PROBLEM SELECTOR RECOMMENDATION 9
Likely 2/2 aspiration pneumonia.  Intubated/sedated on 3 pressors for BP support.    Family agreed for liberation of ETT.   -Fentanyl prn for pain  -Ativan prn for agitation  -glycopyrrolate prn for secretions  -Comfort measures only

## 2023-11-27 NOTE — CONSULT NOTE ADULT - CONVERSATION DETAILS
Spoke to patient's nephew and emergency contact, Mr. Bell Rosettajavon ([phone]). Informed that patient was transferred to ICU for further management. He will visit the patient later in the day. FULL CODE for now as per MOLST Form.
Met with the pt's nephew Ray at the bedside, discussed the role of HCP.  Ray identified Sarina Cortez as the pt's HCP.  Spoke with Ms. Cortez she stated she is the pt's HCP and POA.  She faxed over a copy of the pt Living Will, which denoted the pt does not want CPR or intubation.  She recently had surgery and is unable to get to the hospital.  She would like to continue with current clinical management to give the pt a chance since he is already intubated.  Explained pt remains at high risk for further complications/decline and mortality given his poor prognosis.  Discussed pt's wishes regarding LST such as CPR/ intubation, artificial nutrition and PEG in the context of advanced dementia and debility. INGRID drafted: DNR/DNI/no feeding tube.  Palliative care will follow.   Chaplaincy offered and accepted.  All questions answered.  Support provided.  d/w ICU team     Later Mrs. Cortez arrived to the ICU, she stated she and the pt's family have decided to honor his wishes noted on Living Will.  They are all present and have decided to proceed with liberating him of the ETT, no further w/u and treatment.  Only meds are for comfort care/symptom management.  ICU team aware.

## 2023-11-27 NOTE — PROGRESS NOTE ADULT - PROVIDER SPECIALTY LIST ADULT
Discharge instructions given to patient, a verbal understanding of all instructions was stated. Pt preferred to walk out, VSS, all belongings accounted for.  Sling in place for comfort, ice pack provided.    Nephrology

## 2023-11-27 NOTE — CONSULT NOTE ADULT - CONSULT REQUESTED DATE/TIME
25-Nov-2023 11:21
27-Nov-2023 11:32
27-Nov-2023 12:27
24-Nov-2023 16:03
24-Nov-2023 12:09
27-Nov-2023 06:38

## 2023-11-27 NOTE — PROGRESS NOTE ADULT - SUBJECTIVE AND OBJECTIVE BOX
[facility] NEPHROLOGY- PROGRESS NOTE    Patient is a 89y Male with P. Afib, HTN, HLD, recently admitted to [facility] 11/17-11/22 with left proximal fibula fracture (conservative management; immobilizer), resolving VAMSI (dc/ with SCr 1.37 ), with urinary retention presents with hypotension. Nephrology consulted for Elevated serum creatinine.    s/p RRT 11/27 for acute hypoxic respiratory distress concerning for aspiration PNA s/p intubated; now in ICU    REVIEW OF SYSTEMS: Unable to obtain; intubated/ sedated    VITALS:  T(F): 97.6 (11-27-23 @ 08:00), Max: 98.4 (11-26-23 @ 23:14)  HR: 109 (11-27-23 @ 12:18)  BP: 97/77 (11-27-23 @ 10:15)  RR: 30 (11-27-23 @ 10:15)  SpO2: 100% (11-27-23 @ 12:18)  Wt(kg): --    11-26 @ 07:01  -  11-27 @ 07:00  --------------------------------------------------------  IN: 750 mL / OUT: 175 mL / NET: 575 mL        Weight (kg): 86.1 (11-27 @ 06:27)      PHYSICAL EXAM:  Constitutional: Sedated/ intubated  HEENT: +NGT, +ETT +Rt IJ TLC- intact  Respiratory: +mechanical BS  Cardiovascular: S1, S2, tachy irregular  Gastrointestinal: +mild distention  ; +nevarez  Extremities: No peripheral edema  Neurological: Sedated  MSK: L knee in immobilizer      LABS:  11-27    142  |  109<H>  |  63<H>  ----------------------------<  64<L>  5.4<H>   |  11<L>  |  4.21<H>    Ca    8.4      27 Nov 2023 12:40  Phos  7.9     11-27  Mg     2.4     11-27    TPro  5.0<L>  /  Alb  1.9<L>  /  TBili  1.4<H>  /  DBili      /  AST  1849<H>  /  ALT  960<H>  /  AlkPhos  57  11-27    Creatinine Trend: 4.21 <--, 3.26 <--, 3.27 <--, 3.03 <--, 2.81 <--, 1.37 <--, 1.64 <--                        8.3    11.48 )-----------( 252      ( 27 Nov 2023 12:40 )             28.2     Urine Studies:  Urinalysis Basic - ( 27 Nov 2023 12:40 )    Color:  / Appearance:  / SG:  / pH:   Gluc: 64 mg/dL / Ketone:   / Bili:  / Urobili:    Blood:  / Protein:  / Nitrite:    Leuk Esterase:  / RBC:  / WBC    Sq Epi:  / Non Sq Epi:  / Bacteria:       Sodium, Random Urine: 19 mmol/L (11-25 @ 12:16)  Creatinine, Random Urine: 280 mg/dL (11-25 @ 12:16)  Osmolality, Random Urine: 402 mos/kg (11-25 @ 12:16)        
[facility] NEPHROLOGY- PROGRESS NOTE    Patient is a 89y Male just d/c'ed from [facility] with LE fracture during which he also had urinary retention that resolved who was sent from NH again to the hospital due to fall in NH and severe hypotension today (75/47).  At prior hospitalization, pt was discharged with creatinine 1.37 and on readmission creatinine was 1.8  just 2 days later.  Pt was also found to have >300ml urine on post-void bladder scan and a nevarez cathter was placed.  However, due to agitation/confusion, pt had been pulling on nevarez and so nevarez was removed and currently pt is undergoing regular straight catheterizations.      REVIEW OF SYSTEMS: no h/a, cp, sob    Vital Signs Last 24 Hrs  T(C): 36.6 (26 Nov 2023 07:14), Max: 37.4 (25 Nov 2023 23:29)  T(F): 97.9 (26 Nov 2023 07:14), Max: 99.3 (25 Nov 2023 23:29)  HR: 72 (26 Nov 2023 07:14) (72 - 100)  BP: 110/59 (26 Nov 2023 07:14) (110/59 - 128/56)  RR: 19 (26 Nov 2023 07:14) (18 - 19)  SpO2: 94% (26 Nov 2023 07:14) (92% - 99%)    Parameters below as of 26 Nov 2023 07:14  Patient On (Oxygen Delivery Method): room air        PHYSICAL EXAM:  Constitutional: NAD, frail  Respiratory: CTAB, no wheezes, rales or rhonchi  Cardiovascular: S1, S2, RRR  Gastrointestinal: BS+, soft, NT/ND  Extremities: No cyanosis or clubbing. No peripheral edema  Neurological: A/O x 1  Psychiatric: Confused  : No CVA tenderness. No nevarez.   MSK: L knee in immobilizer      LABS:  11-25    142  |  112<H>  |  54<H>  ----------------------------<  116<H>  4.4   |  24  |  3.27<H>    Ca    8.3<L>      25 Nov 2023 08:10  Phos  4.1     11-25  Mg     1.8     11-25    TPro  5.5<L>  /  Alb  2.5<L>  /  TBili  0.6  /  DBili  0.2  /  AST  30  /  ALT  29  /  AlkPhos  48  11-25    Creatinine Trend: 3.27 <--, 3.03 <--, 2.81 <--, 1.37 <--, 1.64 <--, 1.75 <--                        7.3    10.35 )-----------( 250      ( 25 Nov 2023 08:10 )             23.0     Urine Studies:  Urinalysis Basic - ( 25 Nov 2023 08:10 )    Color:  / Appearance:  / SG:  / pH:   Gluc: 116 mg/dL / Ketone:   / Bili:  / Urobili:    Blood:  / Protein:  / Nitrite:    Leuk Esterase:  / RBC:  / WBC    Sq Epi:  / Non Sq Epi:  / Bacteria:       Sodium, Random Urine: 19 mmol/L (11-25 @ 12:16)  Creatinine, Random Urine: 280 mg/dL (11-25 @ 12:16)  Osmolality, Random Urine: 402 mos/kg (11-25 @ 12:16)    Bladder scan: Post void residual this am 300
Date of Service 11-26-23 @ 10:33    CHIEF COMPLAINT:Patient is a 89y old  Male who presents with a chief complaint of PAF,LOC,VAMSI on CRI .Pt oriented to person and place today.    	  REVIEW OF SYSTEMS:  CONSTITUTIONAL: No fever, weight loss, or fatigue  EYES: No eye pain, visual disturbances, or discharge  ENT:  No difficulty hearing, tinnitus, vertigo; No sinus or throat pain  NECK: No pain or stiffness  RESPIRATORY: No cough, wheezing, chills or hemoptysis; No Shortness of Breath  CARDIOVASCULAR: No chest pain, palpitations, passing out, dizziness, or leg swelling  GASTROINTESTINAL: No abdominal or epigastric pain. No nausea, vomiting, or hematemesis; No diarrhea or constipation. No melena or hematochezia.  GENITOURINARY: No dysuria, frequency, hematuria, or incontinence  NEUROLOGICAL: No headaches, memory loss, loss of strength, numbness, or tremors  SKIN: No itching, burning, rashes, or lesions   LYMPH Nodes: No enlarged glands  ENDOCRINE: No heat or cold intolerance; No hair loss  MUSCULOSKELETAL: No joint pain or swelling; No muscle, back, or extremity pain  PSYCHIATRIC: No depression, anxiety, mood swings, or difficulty sleeping  HEME/LYMPH: No easy bruising, or bleeding gums  ALLERGY AND IMMUNOLOGIC: No hives or eczema	      PHYSICAL EXAM:  T(C): 36.6 (11-26-23 @ 07:14), Max: 37.4 (11-25-23 @ 23:29)  HR: 72 (11-26-23 @ 07:14) (72 - 100)  BP: 110/59 (11-26-23 @ 07:14) (110/59 - 128/56)  RR: 19 (11-26-23 @ 07:14) (18 - 19)  SpO2: 94% (11-26-23 @ 07:14) (92% - 99%)  Wt(kg): --  I&O's Summary    25 Nov 2023 07:01  -  26 Nov 2023 07:00  --------------------------------------------------------  IN: 0 mL / OUT: 500 mL / NET: -500 mL        Appearance: Normal	  HEENT:   Normal oral mucosa, PERRL, EOMI	  Lymphatic: No lymphadenopathy  Cardiovascular: Normal S1 S2, No JVD, No murmurs, No edema  Respiratory: Lungs clear to auscultation	  Psychiatry: A & O x 3, Mood & affect appropriate  Gastrointestinal:  Soft, Non-tender, + BS	  Skin: No rashes, No ecchymoses, No cyanosis	  Neurologic: Non-focal  Extremities: Normal range of motion, No clubbing, cyanosis or edema  Vascular: Peripheral pulses palpable 2+ bilaterally    MEDICATIONS  (STANDING):  ascorbic acid 500 milliGRAM(s) Oral daily  aspirin enteric coated 81 milliGRAM(s) Oral daily  atorvastatin 40 milliGRAM(s) Oral at bedtime  heparin   Injectable 5000 Unit(s) SubCutaneous every 12 hours  influenza  Vaccine (HIGH DOSE) 0.7 milliLiter(s) IntraMuscular once  lactated ringers. 1000 milliLiter(s) (75 mL/Hr) IV Continuous <Continuous>  lactulose Syrup 20 Gram(s) Oral three times a day  metoprolol tartrate 12.5 milliGRAM(s) Oral every 12 hours  tamsulosin 0.4 milliGRAM(s) Oral at bedtime      TELEMETRY: 	nsr, first degree av block,blocked pac's      LABS:	 	    CARDIAC MARKERS:  CARDIAC MARKERS ( 25 Nov 2023 08:10 )  x     / x     / 448 U/L / x     / x      CARDIAC MARKERS ( 24 Nov 2023 11:50 )  x     / x     / 394 U/L / x     / x          Troponin I, High Sensitivity Result: 161.9 ng/L (11-24 @ 20:29)  Troponin I, High Sensitivity Result: 165.4 ng/L (11-24 @ 11:50)                            7.8    10.49 )-----------( 279      ( 26 Nov 2023 08:50 )             25.2     11-26    146<H>  |  115<H>  |  66<H>  ----------------------------<  141<H>  4.2   |  24  |  3.26<H>    Ca    9.6      26 Nov 2023 08:50  Phos  4.0     11-26  Mg     2.0     11-26    TPro  5.5<L>  /  Alb  2.5<L>  /  TBili  0.6  /  DBili  0.2  /  AST  30  /  ALT  29  /  AlkPhos  48  11-25      TSH: Thyroid Stimulating Hormone, Serum: 0.62 uU/mL (11-19 @ 06:29)      	        
INTERVAL HPI/OVERNIGHT EVENTS:  Seen and examined at bedside. No acute overnight events.    PRESSORS: [  ] YES [ X ] NO  WHICH:    Antimicrobial:  cefepime   IVPB 2000 milliGRAM(s) IV Intermittent every 24 hours  metroNIDAZOLE  IVPB      metroNIDAZOLE  IVPB 500 milliGRAM(s) IV Intermittent every 8 hours    Cardiovascular:  norepinephrine Infusion 1 MICROgram(s)/kG/Min IV Continuous <Continuous>  phenylephrine    Infusion 0.3 MICROgram(s)/kG/Min IV Continuous <Continuous>    Pulmonary:    Hematalogic:  aspirin enteric coated 81 milliGRAM(s) Oral daily  heparin   Injectable 5000 Unit(s) SubCutaneous every 12 hours    Other:  chlorhexidine 0.12% Liquid 15 milliLiter(s) Oral Mucosa every 12 hours  chlorhexidine 2% Cloths 1 Application(s) Topical <User Schedule>  chlorhexidine 4% Liquid 1 Application(s) Topical <User Schedule>  fentaNYL   Infusion 1 MICROgram(s)/kG/Hr IV Continuous <Continuous>  influenza  Vaccine (HIGH DOSE) 0.7 milliLiter(s) IntraMuscular once  lactulose Syrup 20 Gram(s) Oral three times a day  pantoprazole  Injectable 40 milliGRAM(s) IV Push daily  propofol Infusion 40 MICROgram(s)/kG/Min IV Continuous <Continuous>  sodium chloride 0.9% lock flush 10 milliLiter(s) IV Push every 1 hour PRN  vasopressin Infusion 0.04 Unit(s)/Min IV Continuous <Continuous>    aspirin enteric coated 81 milliGRAM(s) Oral daily  cefepime   IVPB 2000 milliGRAM(s) IV Intermittent every 24 hours  chlorhexidine 0.12% Liquid 15 milliLiter(s) Oral Mucosa every 12 hours  chlorhexidine 2% Cloths 1 Application(s) Topical <User Schedule>  chlorhexidine 4% Liquid 1 Application(s) Topical <User Schedule>  fentaNYL   Infusion 1 MICROgram(s)/kG/Hr IV Continuous <Continuous>  heparin   Injectable 5000 Unit(s) SubCutaneous every 12 hours  influenza  Vaccine (HIGH DOSE) 0.7 milliLiter(s) IntraMuscular once  lactulose Syrup 20 Gram(s) Oral three times a day  metroNIDAZOLE  IVPB      metroNIDAZOLE  IVPB 500 milliGRAM(s) IV Intermittent every 8 hours  norepinephrine Infusion 1 MICROgram(s)/kG/Min IV Continuous <Continuous>  pantoprazole  Injectable 40 milliGRAM(s) IV Push daily  phenylephrine    Infusion 0.3 MICROgram(s)/kG/Min IV Continuous <Continuous>  propofol Infusion 40 MICROgram(s)/kG/Min IV Continuous <Continuous>  sodium chloride 0.9% lock flush 10 milliLiter(s) IV Push every 1 hour PRN  vasopressin Infusion 0.04 Unit(s)/Min IV Continuous <Continuous>    Drug Dosing Weight  Height (cm): 167.6 (24 Nov 2023 12:00)  Weight (kg): 86.1 (27 Nov 2023 06:27)  BMI (kg/m2): 30.7 (27 Nov 2023 06:27)  BSA (m2): 1.96 (27 Nov 2023 06:27)    CENTRAL LINE: [ ] YES [ ] NO  LOCATION:   DATE INSERTED:  REMOVE: [ ] YES [ ] NO  EXPLAIN:    HUDSON: [ ] YES [ ] NO    DATE INSERTED:  REMOVE:  [ ] YES [ ] NO  EXPLAIN:    A-LINE:  [ ] YES [ ] NO  LOCATION:   DATE INSERTED:  REMOVE:  [ ] YES [ ] NO  EXPLAIN:    PMH -reviewed admission note, no change since admission  PAST MEDICAL & SURGICAL HISTORY:  HTN (hypertension)      HLD (hyperlipidemia)      BPH (benign prostatic hyperplasia)      Paroxysmal atrial fibrillation      Chronic kidney disease (CKD)          ICU Vital Signs Last 24 Hrs  T(C): 36.4 (27 Nov 2023 08:00), Max: 36.9 (26 Nov 2023 23:14)  T(F): 97.6 (27 Nov 2023 08:00), Max: 98.4 (26 Nov 2023 23:14)  HR: 109 (27 Nov 2023 12:18) (90 - 137)  BP: 97/77 (27 Nov 2023 10:15) (58/18 - 143/91)  BP(mean): 84 (27 Nov 2023 10:15) (31 - 86)  ABP: --  ABP(mean): --  RR: 30 (27 Nov 2023 10:15) (20 - 35)  SpO2: 100% (27 Nov 2023 12:18) (60% - 100%)    O2 Parameters below as of 27 Nov 2023 08:00  Patient On (Oxygen Delivery Method): ventilator    O2 Concentration (%): 100        ABG - ( 27 Nov 2023 10:38 )  pH, Arterial: 7.13  pH, Blood: x     /  pCO2: 27    /  pO2: 319   / HCO3: 9     / Base Excess: -18.7 /  SaO2: 100                   11-26 @ 07:01 - 11-27 @ 07:00  --------------------------------------------------------  IN: 750 mL / OUT: 175 mL / NET: 575 mL        Mode: AC/ CMV (Assist Control/ Continuous Mandatory Ventilation)  RR (machine): 18  TV (machine): 450  FiO2: 60  PEEP: 5  ITime: 1  MAP: 10  PIP: 22      PHYSICAL EXAM:    GENERAL: NAD  HEAD:  Atraumatic, Normocephalic  NERVOUS SYSTEM:  Sedated   CHEST/LUNG:  Normal percussion bilaterally  HEART: Regular rate and rhythm; No murmurs, rubs, or gallops  ABDOMEN: Soft, Nondistended  EXTREMITIES: No clubbing, cyanosis, or edema      LABS:  CBC Full  -  ( 27 Nov 2023 12:40 )  WBC Count : 11.48 K/uL  RBC Count : 2.44 M/uL  Hemoglobin : 8.3 g/dL  Hematocrit : 28.2 %  Platelet Count - Automated : 252 K/uL  Mean Cell Volume : 115.6 fl  Mean Cell Hemoglobin : 34.0 pg  Mean Cell Hemoglobin Concentration : 29.4 gm/dL  Auto Neutrophil # : 9.53 K/uL  Auto Lymphocyte # : 1.49 K/uL  Auto Monocyte # : 0.46 K/uL  Auto Eosinophil # : 0.00 K/uL  Auto Basophil # : 0.00 K/uL  Auto Neutrophil % : 83.0 %  Auto Lymphocyte % : 13.0 %  Auto Monocyte % : 4.0 %  Auto Eosinophil % : 0.0 %  Auto Basophil % : 0.0 %    11-27    142  |  109<H>  |  63<H>  ----------------------------<  64<L>  5.4<H>   |  11<L>  |  4.21<H>    Ca    8.4      27 Nov 2023 12:40  Phos  7.9     11-27  Mg     2.4     11-27    TPro  5.0<L>  /  Alb  1.9<L>  /  TBili  1.4<H>  /  DBili  x   /  AST  1849<H>  /  ALT  960<H>  /  AlkPhos  57  11-27    PT/INR - ( 27 Nov 2023 12:40 )   PT: 16.9 sec;   INR: 1.50 ratio         PTT - ( 27 Nov 2023 12:40 )  PTT:25.6 sec  Urinalysis Basic - ( 27 Nov 2023 12:40 )    Color: x / Appearance: x / SG: x / pH: x  Gluc: 64 mg/dL / Ketone: x  / Bili: x / Urobili: x   Blood: x / Protein: x / Nitrite: x   Leuk Esterase: x / RBC: x / WBC x   Sq Epi: x / Non Sq Epi: x / Bacteria: x      Culture Results:   No growth at 48 Hours (11-25 @ 08:10)  Culture Results:   No growth at 48 Hours (11-25 @ 08:05)      RADIOLOGY & ADDITIONAL STUDIES REVIEWED:  ***    [ ]GOALS OF CARE DISCUSSION WITH PATIENT/FAMILY/PROXY:    CRITICAL CARE TIME SPENT: 35 minutes
Date of Service 11-25-23 @ 10:53    CHIEF COMPLAINT:Patient is a 89y old  Male who presents with a chief complaint of AMS.Pt confused this am.    	  REVIEW OF SYSTEMS:    [x ] Unable to obtain    PHYSICAL EXAM:  T(C): 37.1 (11-25-23 @ 08:13), Max: 37.4 (11-25-23 @ 00:10)  HR: 102 (11-25-23 @ 08:13) (90 - 113)  BP: 109/92 (11-25-23 @ 08:13) (93/68 - 140/75)  RR: 18 (11-25-23 @ 08:13) (18 - 19)  SpO2: 94% (11-25-23 @ 08:13) (94% - 100%)  Wt(kg): --  I&O's Summary      Appearance: Normal	  HEENT:   Normal oral mucosa, PERRL, EOMI	  Lymphatic: No lymphadenopathy  Cardiovascular: Normal S1 S2, No JVD, No murmurs, No edema  Respiratory: Lungs clear to auscultation	  Gastrointestinal:  Soft, Non-tender, + BS	  Skin: No rashes, No ecchymoses, No cyanosis	  Neurologic: Non-focal  Extremities: Normal range of motion, No clubbing, cyanosis or edema  Vascular: Peripheral pulses palpable 2+ bilaterally    MEDICATIONS  (STANDING):  ascorbic acid 500 milliGRAM(s) Oral daily  aspirin enteric coated 81 milliGRAM(s) Oral daily  atorvastatin 40 milliGRAM(s) Oral at bedtime  heparin   Injectable 5000 Unit(s) SubCutaneous every 12 hours  influenza  Vaccine (HIGH DOSE) 0.7 milliLiter(s) IntraMuscular once  lactated ringers. 1000 milliLiter(s) (75 mL/Hr) IV Continuous <Continuous>  lactulose Syrup 20 Gram(s) Oral three times a day  metoprolol tartrate 12.5 milliGRAM(s) Oral every 12 hours  tamsulosin 0.4 milliGRAM(s) Oral at bedtime      TELEMETRY: 	svt,nsr with first degree av block    	  	  LABS:	 	    CARDIAC MARKERS:  CARDIAC MARKERS ( 25 Nov 2023 08:10 )  x     / x     / 448 U/L / x     / x      CARDIAC MARKERS ( 24 Nov 2023 11:50 )  x     / x     / 394 U/L / x     / x          Troponin I, High Sensitivity Result: 161.9 ng/L (11-24 @ 20:29)  Troponin I, High Sensitivity Result: 165.4 ng/L (11-24 @ 11:50)                            7.3    10.35 )-----------( 250      ( 25 Nov 2023 08:10 )             23.0     11-25    142  |  112<H>  |  54<H>  ----------------------------<  116<H>  4.4   |  24  |  3.27<H>    Ca    8.3<L>      25 Nov 2023 08:10  Phos  4.1     11-25  Mg     1.8     11-25    TPro  5.5<L>  /  Alb  2.5<L>  /  TBili  0.6  /  DBili  0.2  /  AST  30  /  ALT  29  /  AlkPhos  48  11-25      TSH: Thyroid Stimulating Hormone, Serum: 0.62 uU/mL (11-19 @ 06:29)      	    < from: Xray Chest 1 View- PORTABLE-Urgent (Xray Chest 1 View- PORTABLE-Urgent .) (11.24.23 @ 14:42) >  ACC: [id number] EXAM:  XR CHEST PORTABLE URGENT 1V   ORDERED BY: FRANK MORROW     PROCEDURE DATE:  11/24/2023          INTERPRETATION:  HISTORY:  Admitting Dxs: N17.9 ACUTE KIDNEY FAILURE,   UNSPECIFIED; AMS;  TECHNIQUE: Portable frontal view of the chest, 1 view.  COMPARISON none.  FINDINGS/  IMPRESSION:    HEART:difficult to access in this projection.  LUNGS: There is a linear opacity at the left base, etiology unclear.   Consider CT chest. Right lung is clear..  BONES: degenerative changes    --- End of Report ---            MADISON COSTA MD; Attending Interventional Radiologist  This document has been electronically signed. Nov 25 2023  7:15AM    < end of copied text >  < from: CT Cervical Spine No Cont (11.24.23 @ 13:18) >  ACC: [id number] EXAM:  CT CERVICAL SPINE   ORDERED BY: FRANK MORROW     ACC: [id number] EXAM:  CT BRAIN   ORDERED BY: FRANK MORROW     PROCEDURE DATE:  11/24/2023          INTERPRETATION:  CT HEAD, CT CERVICAL SPINE    Clinical information: s/p fall    IMAGING TECHNIQUE:  Noncontrast CT.  Axial Acquisition.  Sagittal and coronal reformations.    COMPARISON:  Exams are compared to prior studies of 11/17/2023    HEAD CT FINDINGS:  HEMORRHAGE:  No evidence of intracranial hemorrhage.  BRAIN PARENCHYMA:  Mild to moderate atrophy. Mild periventricular white   matter ischemic changes. No mass or mass effect.  VENTRICLES / SHIFT:  No hydrocephalus. No midline shift.  EXTRA-AXIAL / BASAL CISTERNS:  No extra-axial mass. Basal cisterns   preserved.  Atherosclerotic calcifications of the cavernous internal   carotid arteries.  CALVARIUM AND EXTRACRANIAL SOFT TISSUES:  No depressed calvarial fracture.  SINUSES, ORBITS, MASTOIDS:  Moderate retention cyst right maxillary   sinus. Mild fluid left mastoid    CERVICAL SPINE FINDINGS:  FRACTURES:  No evidence of an acute cervical spine fracture.  ALIGNMENT:  Straightening of the normal cervical lordosis. Stable grade 1   anterolisthesis C7 on T1  SPINAL COLUMN:  Vertebral body heights are well-maintained. Fusion of the   C2-3, C5-6 and C6-7 disc spaces on a degenerative basis. Severe narrowing   of the C3-4 and C4-5 disc spaces with associated endplate degenerative   changes and osteophytes. Multilevel facet DJD.  OTHER: Marked calcification/ossification of the left stylohyoid ligament.   Atherosclerotic calcification of the left carotid bifurcation    IMPRESSION:  HEAD CT:  No evidence of an acute traumatic intracranial injury.  CERVICAL SPINE CT:  No evidence of an acute cervical spine fracture.    --- End of Report ---            ELSA CORTÉS MD; Attending Radiologist  This document has been electronically signed. Nov 24 2023  1:34PM    < end of copied text >  < from: US Renal (11.22.23 @ 11:06) >  ACC: [id number] EXAM:  US KIDNEY(S)   ORDERED BY: EDUAR KNOTT     PROCEDURE DATE:  11/22/2023          INTERPRETATION:  CLINICAL INFORMATION: Chronic kidney disease    COMPARISON: No prior renal ultrasound is available for comparison.    TECHNIQUE:Sonography of the kidneys and bladder.    FINDINGS:  Right kidney: 11.4 cm. No renal mass, hydronephrosis or calculi. 1.5 x   1.8 x 1.5 cm cyst in the right kidney.    Left kidney: 7.2 cm which is atrophic.. No renal mass, hydronephrosis or   calculi.    Urinary bladder: Unremarkable.    IMPRESSION:  No hydronephrosis.    Atrophic left kidney.    Small right renal cyst.    Ammonia, Serum: 129 umol/L (11.24.23 @ 20:29)       
Date of Service 11-27-23 @ 10:11    CHIEF COMPLAINT:Patient is a 89y old  Male who presents with a chief complaint of AHRF, Septic Shock 2/2 Aspiration Pneumonia .Pt s/p intubation and in ICU for aspiration pneumonia.    	  REVIEW OF SYSTEMS:  	  [x ] Unable to obtain    PHYSICAL EXAM:  T(C): 36.4 (11-27-23 @ 06:27), Max: 36.9 (11-26-23 @ 23:14)  HR: 126 (11-27-23 @ 08:54) (73 - 137)  BP: 100/58 (11-27-23 @ 07:00) (100/58 - 143/91)  RR: 25 (11-27-23 @ 07:00) (19 - 28)  SpO2: 100% (11-27-23 @ 07:00) (60% - 100%)  Wt(kg): --  I&O's Summary    26 Nov 2023 07:01  -  27 Nov 2023 07:00  --------------------------------------------------------  IN: 750 mL / OUT: 175 mL / NET: 575 mL        Appearance: Normal	  HEENT:   Normal oral mucosa, PERRL, EOMI	  Lymphatic: No lymphadenopathy  Cardiovascular: Normal S1 S2, No JVD, No murmurs, No edema  Respiratory: B/L ronchi  Gastrointestinal:  Soft, Non-tender, + BS	  Skin: No rashes, No ecchymoses, No cyanosis	  Extremities: Normal range of motion, No clubbing, cyanosis or edema  Vascular: Peripheral pulses palpable 2+ bilaterally    MEDICATIONS  (STANDING):  aspirin enteric coated 81 milliGRAM(s) Oral daily  cefepime   IVPB 2000 milliGRAM(s) IV Intermittent every 24 hours  chlorhexidine 0.12% Liquid 15 milliLiter(s) Oral Mucosa every 12 hours  chlorhexidine 2% Cloths 1 Application(s) Topical <User Schedule>  chlorhexidine 4% Liquid 1 Application(s) Topical <User Schedule>  heparin   Injectable 5000 Unit(s) SubCutaneous every 12 hours  influenza  Vaccine (HIGH DOSE) 0.7 milliLiter(s) IntraMuscular once  lactated ringers Bolus 1000 milliLiter(s) IV Bolus once  lactated ringers Bolus 1000 milliLiter(s) IV Bolus once  norepinephrine Infusion 1 MICROgram(s)/kG/Min (80.7 mL/Hr) IV Continuous <Continuous>  propofol Infusion 40 MICROgram(s)/kG/Min (20.7 mL/Hr) IV Continuous <Continuous>  vasopressin Infusion 0.04 Unit(s)/Min (6 mL/Hr) IV Continuous <Continuous>    	  	  LABS:	 	    Troponin I, High Sensitivity Result: 161.9 ng/L (11-24 @ 20:29)  Troponin I, High Sensitivity Result: 165.4 ng/L (11-24 @ 11:50)                            7.8    10.49 )-----------( 279      ( 26 Nov 2023 08:50 )             25.2     11-26    146<H>  |  115<H>  |  66<H>  ----------------------------<  141<H>  4.2   |  24  |  3.26<H>    Ca    9.6      26 Nov 2023 08:50  Phos  4.0     11-26  Mg     2.0     11-26        TSH: Thyroid Stimulating Hormone, Serum: 0.62 uU/mL (11-19 @ 06:29)      	    < from: Xray Knee 1 or 2 Views, Left (11.26.23 @ 11:48) >  ACC: [id number] EXAM:  XR KNEE 1-2 VIEWS LT   ORDERED BY: JOHN GARCIA     PROCEDURE DATE:  11/26/2023          INTERPRETATION:  Left knee. 2 views.    Prior imaging showed a fracture of the left fibular head.    IMPRESSION examination splint applied.    Small knee effusion noted.    There is a fracture difficult to assess.    IMPRESSION: Splinting.    < end of copied text >      
PGY-1 Progress Note discussed with attending    PAGER #: [phone]] TILL 5:00 PM  PLEASE CONTACT ON CALL TEAM:  - On Call Team (Please refer to Rosa) FROM 5:00 PM - 8:30PM  - Nightfloat Team FROM 8:30 -7:30 AM    CHIEF COMPLAINT & BRIEF HOSPITAL COURSE: No acute overnight events.  Seen patient at bedside. No new changes.     INTERVAL HPI/OVERNIGHT EVENTS:   MEDICATIONS  (STANDING):  ascorbic acid 500 milliGRAM(s) Oral daily  aspirin enteric coated 81 milliGRAM(s) Oral daily  atorvastatin 40 milliGRAM(s) Oral at bedtime  heparin   Injectable 5000 Unit(s) SubCutaneous every 12 hours  influenza  Vaccine (HIGH DOSE) 0.7 milliLiter(s) IntraMuscular once  lactated ringers. 1000 milliLiter(s) (75 mL/Hr) IV Continuous <Continuous>  lactulose Syrup 20 Gram(s) Oral three times a day  metoprolol tartrate 12.5 milliGRAM(s) Oral every 12 hours  tamsulosin 0.4 milliGRAM(s) Oral at bedtime    MEDICATIONS  (PRN):  acetaminophen     Tablet .. 650 milliGRAM(s) Oral every 6 hours PRN Temp greater or equal to 38C (100.4F), Mild Pain (1 - 3)  melatonin 3 milliGRAM(s) Oral at bedtime PRN Insomnia  OLANZapine Injectable 2.5 milliGRAM(s) IntraMuscular every 12 hours PRN agitation  ondansetron Injectable 4 milliGRAM(s) IV Push every 8 hours PRN Nausea and/or Vomiting    Vital Signs Last 24 Hrs  T(C): 36.4 (25 Nov 2023 15:17), Max: 37.4 (25 Nov 2023 00:10)  T(F): 97.5 (25 Nov 2023 15:17), Max: 99.3 (25 Nov 2023 00:10)  HR: 100 (25 Nov 2023 15:17) (85 - 113)  BP: 128/56 (25 Nov 2023 15:17) (93/68 - 140/75)  BP(mean): --  RR: 18 (25 Nov 2023 15:17) (18 - 19)  SpO2: 96% (25 Nov 2023 15:17) (92% - 98%)    Parameters below as of 25 Nov 2023 15:17  Patient On (Oxygen Delivery Method): room air        PHYSICAL EXAMINATION:  GENERAL: NAD, well built  HEAD:  Atraumatic, Normocephalic  EYES:  conjunctiva and sclera clear  CHEST/LUNG: Clear to auscultation. No rales, rhonchi, wheezing, or rubs  HEART: Regular rate and rhythm; No murmurs, rubs, or gallops  ABDOMEN: Soft, Nontender, Nondistended; Bowel sounds present   EXTREMITIES:  2+ Peripheral Pulses, No clubbing, cyanosis, or edema  SKIN: warm dry                          7.3    10.35 )-----------( 250      ( 25 Nov 2023 08:10 )             23.0     11-25    142  |  112<H>  |  54<H>  ----------------------------<  116<H>  4.4   |  24  |  3.27<H>    Ca    8.3<L>      25 Nov 2023 08:10  Phos  4.1     11-25  Mg     1.8     11-25    TPro  5.5<L>  /  Alb  2.5<L>  /  TBili  0.6  /  DBili  0.2  /  AST  30  /  ALT  29  /  AlkPhos  48  11-25    LIVER FUNCTIONS - ( 25 Nov 2023 08:10 )  Alb: 2.5 g/dL / Pro: 5.5 g/dL / ALK PHOS: 48 U/L / ALT: 29 U/L DA / AST: 30 U/L / GGT: x           CARDIAC MARKERS ( 25 Nov 2023 08:10 )  x     / x     / 448 U/L / x     / x      CARDIAC MARKERS ( 24 Nov 2023 11:50 )  x     / x     / 394 U/L / x     / x          PT/INR - ( 25 Nov 2023 08:10 )   PT: 12.4 sec;   INR: 1.09 ratio         PTT - ( 25 Nov 2023 08:10 )  PTT:26.5 sec    CAPILLARY BLOOD GLUCOSE      RADIOLOGY & ADDITIONAL TESTS:                  
PGY-1 Progress Note discussed with attending      PLEASE CONTACT ON CALL TEAM:  - On Call Team (Please refer to Rosa) FROM 5:00 PM - 8:30PM  - Nightfloat Team FROM 8:30 -7:30 AM    INTERVAL HPI/OVERNIGHT EVENTS:   No acute events overnight.  Patient examined at bedside this AM.  Patient denies acute complaints     REVIEW OF SYSTEMS:  CONSTITUTIONAL: No fever, weight loss, or fatigue  RESPIRATORY: No cough, wheezing,  or hemoptysis; No shortness of breath  CARDIOVASCULAR: No chest pain, palpitations, dizziness, or leg swelling  GASTROINTESTINAL: No abdominal pain. No nausea, vomiting, or hematemesis; No diarrhea or constipation. No melena or hematochezia.  GENITOURINARY: No dysuria, hematuria, or urinary frequency  NEUROLOGICAL: No headaches, memory loss. No focal weakness, numbness, or tremors  SKIN: No itching, burning, or rashes    MEDICATIONS  (STANDING):  ascorbic acid 500 milliGRAM(s) Oral daily  aspirin enteric coated 81 milliGRAM(s) Oral daily  atorvastatin 40 milliGRAM(s) Oral at bedtime  heparin   Injectable 5000 Unit(s) SubCutaneous every 12 hours  influenza  Vaccine (HIGH DOSE) 0.7 milliLiter(s) IntraMuscular once  lactated ringers. 1000 milliLiter(s) (75 mL/Hr) IV Continuous <Continuous>  lactulose Syrup 20 Gram(s) Oral three times a day  metoprolol tartrate 12.5 milliGRAM(s) Oral every 12 hours  tamsulosin 0.4 milliGRAM(s) Oral at bedtime    MEDICATIONS  (PRN):  acetaminophen     Tablet .. 650 milliGRAM(s) Oral every 6 hours PRN Temp greater or equal to 38C (100.4F), Mild Pain (1 - 3)  melatonin 3 milliGRAM(s) Oral at bedtime PRN Insomnia  OLANZapine Injectable 2.5 milliGRAM(s) IntraMuscular every 12 hours PRN agitation  ondansetron Injectable 4 milliGRAM(s) IV Push every 8 hours PRN Nausea and/or Vomiting      Vital Signs Last 24 Hrs  T(C): 36.6 (26 Nov 2023 07:14), Max: 37.4 (25 Nov 2023 23:29)  T(F): 97.9 (26 Nov 2023 07:14), Max: 99.3 (25 Nov 2023 23:29)  HR: 72 (26 Nov 2023 07:14) (72 - 100)  BP: 110/59 (26 Nov 2023 07:14) (110/59 - 128/56)  BP(mean): --  RR: 19 (26 Nov 2023 07:14) (18 - 19)  SpO2: 94% (26 Nov 2023 07:14) (92% - 99%)    Parameters below as of 26 Nov 2023 07:14  Patient On (Oxygen Delivery Method): room air        PHYSICAL EXAMINATION:  GENERAL: NAD, lying comfortably in bed  HEAD:  Atraumatic, Normocephalic  EYES:  Conjunctiva and sclera clear  NECK: Supple. No JVD. Normal thyroid  CHEST/LUNG: Clear to auscultation. No rales or wheezing  HEART: Regular rate and rhythm. No abnormal heart sounds  ABDOMEN: Soft, nontender, and nondistended. Bowel sounds present. No pain or masses on palpation  NERVOUS SYSTEM:  Alert & Oriented X2, lethargic   EXTREMITIES: Left leg immobilized in knee brace. 2+ Peripheral Pulses. No appreciable edema  SKIN: warm dry                          7.8    10.49 )-----------( 279      ( 26 Nov 2023 08:50 )             25.2     11-26    146<H>  |  115<H>  |  66<H>  ----------------------------<  141<H>  4.2   |  24  |  3.26<H>    Ca    9.6      26 Nov 2023 08:50  Phos  4.0     11-26  Mg     2.0     11-26    TPro  5.5<L>  /  Alb  2.5<L>  /  TBili  0.6  /  DBili  0.2  /  AST  30  /  ALT  29  /  AlkPhos  48  11-25    LIVER FUNCTIONS - ( 25 Nov 2023 08:10 )  Alb: 2.5 g/dL / Pro: 5.5 g/dL / ALK PHOS: 48 U/L / ALT: 29 U/L DA / AST: 30 U/L / GGT: x           CARDIAC MARKERS ( 25 Nov 2023 08:10 )  x     / x     / 448 U/L / x     / x      CARDIAC MARKERS ( 24 Nov 2023 11:50 )  x     / x     / 394 U/L / x     / x          PT/INR - ( 25 Nov 2023 08:10 )   PT: 12.4 sec;   INR: 1.09 ratio         PTT - ( 25 Nov 2023 08:10 )  PTT:26.5 sec    I&O's Summary    25 Nov 2023 07:01  -  26 Nov 2023 07:00  --------------------------------------------------------  IN: 0 mL / OUT: 500 mL / NET: -500 mL          Culture - Urine (collected 24 Nov 2023 12:44)  Source: Clean Catch Clean Catch (Midstream)  Final Report (25 Nov 2023 16:17):    No growth        CAPILLARY BLOOD GLUCOSE      RADIOLOGY & ADDITIONAL TESTS:

## 2023-11-27 NOTE — CONSULT NOTE ADULT - ASSESSMENT
89 year old male from home, who ambulates independently, with PMH of paroxysmal atrial fibrillation, HTN, HLD, CKD and BPH who was sent by the rehab center for altered mental status and fall in the nursing home. Patient is being admitted for further management of acute metabolic encephalopathy, recurrent fall and VAMSI. Found to have elevated ammonia. Admitted to ICU for AHRF, septic shock 2/2 Aspiration Pneumonia.    #AHRF  #Septic Shock  #Aspiration Pneumonia  #Acute Metabolic Encephalopathy  #CVA  #VAMSI/ATN  #Hyperammonenia  #PAF  #HTN  #BPH    _________CNS___________    #Acute Metabolic Encephalopathy  Pt sent from NH for acute agitation and fall.   - Likely episode of delirium  - Might be 2/2 to worsening dementia vs uremia vs other electrolyte abnormality vs infection vs urinary retention.   - HEAD CT:  No evidence of an acute traumatic intracranial injury.  - CERVICAL SPINE CT:  No evidence of an acute cervical spine fracture.  - Ammonia level is 129   - c/w lactulose.   - Consulted neurology - Dr. Terrell.    #CVA  - transferred to ICU for AMS  - noted to have unequal pupils (dilated and sluggish L pupil) and L facial droop  - f/u CT Head    #HTN  - Pt w pmh of HTN on home med metoprolol  - hold in the setting of shock  _________CVS___________    #Septic Shock  - noted to have low BP  - likely due to Aspiration Pneumonia  - started on IV Zosyn  - BCx (11/25) - NGTD  - UA - neg  - f/u BCx, Sputum  - ID Dr. Falguni campoulted    #Atrial Fibrillation  - Found to have new onset atrial fibrillation during last admission.   - Discharged on digoxin, metoprolol.   - Hold digoxin until digoxin level result (due to VAMSI).   - C/w metoprolol.   - Pt not on AC due to frequent falls.  - Consulted cardio - Dr. Valencia.    _________ RESP__________    #AHRF  #Aspiration Pneumonia  - transferred to ICU for hypoxia, AMS  - continue mechanical ventilation - 12/450/100/5  - f/u Sputum, ABG    __________GI____________    #Hyperammonemia  - NH4 - 129  - continue lactulose  - monitor BM    ________ RENAL__________    #VAMSI/ATN  - Pt p/w Cr of 2.81 (unknown baseline but discharged with Cr of 1.37)  - Bladder scan - no urine  - Cr has remained elevated w/o improvement despite fluids, may likely be ATN in s/o significant dehydrations at rehab facility   - Avoid nephrotoxic agents   - c/w fluids  - monitor BMP  - Nephro (Dr. Reyna) consulted    #BPH  - Pt w pmh of BPH on home med tamsulosin  - c/w home med.    _________MSK___________    #no issues    __________ID____________    #Septic Shock  #Aspiration Pneumonia  - see above    _________ENDO__________    #no issues    _______HEME/ONC_______    #Anemia  - Pt w Macrocytic Anemia  - hg is 7.3 > 7.8  - .6 >105  - F/U B12, homocystine and methylmalonic acid levels.    _________SKIN____________    #no issues    ________Prophylaxis_______    #DVT - Heparin SQ  #GI - IV PPI     __________GOC/ DISPO___________    #GOC - FULL CODE  #DISPO - Admit to ICU

## 2023-11-27 NOTE — CONSULT NOTE ADULT - ATTENDING COMMENTS
89 year old male from home with PMH of paroxysmal atrial fibrillation, HTN, HLD, CKD and BPH who was sent by the rehab center for altered mental status and fall in the nursing home, h/o L fibula fracture. Initially admitted for further management of acute metabolic encephalopathy, recurrent fall and VAMSI. On 11/27 RRT called for altered mental status, patient unarousable, O2 sat in 70s on NRB, initially bradycardic, hypotensive with systolic in 70s, subsequently intubated.    Impression:  - Acute hypoxic respiratory failure, suspect aspiration event  - Aspiration PNA  - Metabolic encephalopathy  - VAMSI on CKD  - Hyperammonemia   - Paroxysmal atrial fibrillation    Plan:  - propofol for sedation goal RASS -2-0  - CTH  - f/u ABG, adjust vent settings  - daily SAT/SBT  - maintain MAP >65, pressor support if needed  - PRN IVP lopressor for RVR  - OGT, trophic feeds once pressor requirement decreasing  - c/w lactulose titrated to bowel movements given hyperammonemia of unclear etiology  - blood cx, tracheal aspirate, start broad GN coverage  - strict I&O, targeted volume resuscitation   - DVT ppx with subq heparin

## 2023-11-27 NOTE — PROGRESS NOTE ADULT - ASSESSMENT
89 year old male from home, who ambulates independently, with PMHX of paroxysmal atrial fibrillation, HTN, HLD, CKD and BPH who was sent by the rehab center for altered mental status and fall in the nursing home,respiratory failure due to aspiration pneumonia.  1.ICU monitoring.  2.Aspiration pneumonia-ABX.  3.Hypotension-pressor support as per ICU.  4.PAF-asa,dig qod,no ac due to fall bleed risk.  5.VAMSI on CRI with known urinary retention-Renal f/u,IVF,nevarez.  6.Lipid d/o-statin.  7.GI and DVT prophylaxis.

## 2023-11-27 NOTE — CONSULT NOTE ADULT - ASSESSMENT
Septic Shock - with multiorgan failure  aspiration Pneumonia  Resp failure      Plan - Cont Maxipime 2gms iv q24hrs  Add Flagyl 500mgs iv q8hrs  prognosis is grave.

## 2023-11-27 NOTE — CONSULT NOTE ADULT - SUBJECTIVE AND OBJECTIVE BOX
[facility] Geriatric and Palliative Consult Service:  Eliz Mckeon DO: cell ([phone])  Jack Beatty MD: cell ([phone])  Luis King NP: cell ([phone])   Carlos Bravo SW: cell ([phone])   Anayeli Lee NP: via [employer] Teams    Can contact any Palliative Team member via [employer] Teams for consults and questions      HPI:  Patient is a 89 year old male from home, who ambulates independently, with PMH of paroxysmal atrial fibrillation, HTN, HLD, CKD and BPH who was sent by the rehab center for altered mental status and fall in the nursing home. Patient is evaluated at bedside and is awake (AAOx2 - baseline) who reports that he remembers falling yesterday after he was trying to get away from people at the rehab center. He reports he was trying to get away as he was being tied down to bed and there were a lot of people trying to hold him. Patient denies feeling  ..patient reported as confused and  trying to remove his knee immobilizer yesterday. This morning patient was noted to be with BP 75/47.'  Patient denies any recent fevers, chills, weakness, fatigue, malaise, headache, dizziness, lightheadedness, chest pain, palpitations, shortness of breath, cough, nausea, vomiting, abdominal pain, diarrhea, constipation, melena, hematochezia, dysuria, urinary frequency, or urgency. Patient denies any other complains at this time.  Of note: Patient was discharged 2 days ago from [facility]. Patient was admitted post fall and found to have fracture of left fibular head. Patient was also found to have new onset atrial fibrillation in last admission.  (24 Nov 2023 16:04)      PAST MEDICAL & SURGICAL HISTORY:  HTN (hypertension)      HLD (hyperlipidemia)      BPH (benign prostatic hyperplasia)      Paroxysmal atrial fibrillation      Chronic kidney disease (CKD)          SOCIAL HISTORY:    Admitted from:  home  (with HHA)           assisted living          MINDY       LT   [ none ] Substance abuse, [ none ] Tobacco hx, [ none ] Alcohol hx, [ none ] Home Opioid Hx    FAMILY HISTORY:   unable to obtain from patient due to poor mentation, family unable to give information, see H&P for history  Baseline ADLs (prior to admission):    Allergies    penicillin (Hives)    Intolerances      Present Symptoms: Mild, Moderate, Severe  Pain:             Location -                               Aggravating factors -             Quality -             Radiation -             Timing-             Severity (0-10 scale):             Minimal acceptable level (0-10 scale):  Fatigue:  Nausea:  Lack of Appetite:   SOB:  Depression:  Anxiety:  Review of Systems: [All others negative or Unable to obtain due to poor mentation]    CPOT:    https://www.[email or web address]/getattachment/[email or web address]/Critical-Care-Pain-Observation-Tool-(CPOT)  PAIN AD Score:   http://[email or web address]/painad.pdf (press ctrl +  left click to view)      MEDICATIONS  (STANDING):  aspirin enteric coated 81 milliGRAM(s) Oral daily  cefepime   IVPB 2000 milliGRAM(s) IV Intermittent every 24 hours  chlorhexidine 0.12% Liquid 15 milliLiter(s) Oral Mucosa every 12 hours  chlorhexidine 2% Cloths 1 Application(s) Topical <User Schedule>  chlorhexidine 4% Liquid 1 Application(s) Topical <User Schedule>  heparin   Injectable 5000 Unit(s) SubCutaneous every 12 hours  influenza  Vaccine (HIGH DOSE) 0.7 milliLiter(s) IntraMuscular once  lactated ringers Bolus 1000 milliLiter(s) IV Bolus once  lactulose Syrup 20 Gram(s) Oral three times a day  norepinephrine Infusion 1 MICROgram(s)/kG/Min (80.7 mL/Hr) IV Continuous <Continuous>  pantoprazole  Injectable 40 milliGRAM(s) IV Push daily  propofol Infusion 40 MICROgram(s)/kG/Min (20.7 mL/Hr) IV Continuous <Continuous>  vasopressin Infusion 0.04 Unit(s)/Min (6 mL/Hr) IV Continuous <Continuous>    MEDICATIONS  (PRN):  sodium chloride 0.9% lock flush 10 milliLiter(s) IV Push every 1 hour PRN Pre/post blood products, medications, blood draw, and to maintain line patency      PHYSICAL EXAM:  Vital Signs Last 24 Hrs  T(C): 36.4 (27 Nov 2023 08:00), Max: 36.9 (26 Nov 2023 23:14)  T(F): 97.6 (27 Nov 2023 08:00), Max: 98.4 (26 Nov 2023 23:14)  HR: 108 (27 Nov 2023 10:15) (84 - 137)  BP: 97/77 (27 Nov 2023 10:15) (58/18 - 143/91)  BP(mean): 84 (27 Nov 2023 10:15) (31 - 86)  RR: 30 (27 Nov 2023 10:15) (20 - 35)  SpO2: 100% (27 Nov 2023 10:15) (60% - 100%)    Parameters below as of 27 Nov 2023 08:00  Patient On (Oxygen Delivery Method): ventilator    O2 Concentration (%): 100    General: alert  oriented x ____    lethargic distressed cachexia  nonverbal  unarousable verbal    Palliative Performance Scale/Karnofsky Score:  http://npcrc.org/files/news/palliative_performance_scale_ppsv2.pdf    HEENT: no abnormal lesion, dry mouth  ET tube/trach oral lesions:  Lungs: tachypnea/labored breathing, audible excessive secretions  CV: RRR, S1S2, tachycardia  GI: soft non distended non tender  incontinent               PEG/NG/OG tube  constipation  last BM:   : incontinent  oliguria/anuria  nevarez  Musculoskeletal: weakness x4 edema x4    ambulatory with assistance   mostly/fully bedbound/wheelchair bound  Skin: no abnormal skin lesions, poor skin turgor, pressure ulcer stage:   Neuro: no deficits, mild cognitive impairment dsyphagia/dysarthria paresis  Oral intake ability: unable/only mouth care, minimal moderate full capability    LABS:                        7.8    10.49 )-----------( 279      ( 26 Nov 2023 08:50 )             25.2     11-26    146<H>  |  115<H>  |  66<H>  ----------------------------<  141<H>  4.2   |  24  |  3.26<H>    Ca    9.6      26 Nov 2023 08:50  Phos  4.0     11-26  Mg     2.0     11-26      Urinalysis Basic - ( 26 Nov 2023 08:50 )    Color: x / Appearance: x / SG: x / pH: x  Gluc: 141 mg/dL / Ketone: x  / Bili: x / Urobili: x   Blood: x / Protein: x / Nitrite: x   Leuk Esterase: x / RBC: x / WBC x   Sq Epi: x / Non Sq Epi: x / Bacteria: x        RADIOLOGY & ADDITIONAL STUDIES:         [facility] Geriatric and Palliative Consult Service:  Eliz Mckeon DO: cell ([phone])  Jack Beatty MD: cell ([phone])  Luis Morrow NP: cell ([phone])   Carlos Bravo SW: cell ([phone])   Anayeli Lee NP: via [employer] Teams    Can contact any Palliative Team member via [employer] Teams for consults and questions      HPI:  Patient is a 89 year old male from home, who ambulates independently, with PMH of paroxysmal atrial fibrillation, HTN, HLD, CKD and BPH who was sent by the rehab center for altered mental status and fall in the nursing home. Patient is evaluated at bedside and is awake (AAOx2 - baseline) who reports that he remembers falling yesterday after he was trying to get away from people at the rehab center. He reports he was trying to get away as he was being tied down to bed and there were a lot of people trying to hold him. Patient denies feeling  ..patient reported as confused and  trying to remove his knee immobilizer yesterday. This morning patient was noted to be with BP 75/47.'  Patient denies any recent fevers, chills, weakness, fatigue, malaise, headache, dizziness, lightheadedness, chest pain, palpitations, shortness of breath, cough, nausea, vomiting, abdominal pain, diarrhea, constipation, melena, hematochezia, dysuria, urinary frequency, or urgency. Patient denies any other complains at this time.  Of note: Patient was discharged 2 days ago from [facility]. Patient was admitted post fall and found to have fracture of left fibular head. Patient was also found to have new onset atrial fibrillation in last admission.  (24 Nov 2023 16:04)    Interval hx: Pt intubated admitted to ICU.  Family at the bedside.    PAST MEDICAL & SURGICAL HISTORY:  HTN (hypertension)      HLD (hyperlipidemia)      BPH (benign prostatic hyperplasia)      Paroxysmal atrial fibrillation      Chronic kidney disease (CKD)          SOCIAL HISTORY:    Admitted from:  home  alone  Pt's HCP is Sarina Cortez  [ none ] Substance abuse, [ none ] Tobacco hx, [ none ] Alcohol hx, [ none ] Home Opioid Hx    Mandaeism: Restorationism    Sarina Cortez  (HCP)         Phone#  [phone]  jensen Waterman  (nephew)  Phone#  [phone]    FAMILY HISTORY:   unable to obtain from patient due to poor mentation, family unable to give information, see H&P for history  Baseline ADLs (prior to admission): Bedbound    Allergies    penicillin (Hives)    Intolerances      Present Symptoms: Mild, Moderate, Severe  Unable to obtain due to poor mentation]    CPOT:    https://[email or web address]/getattachment/[email or web address]/Critical-Care-Pain-Observation-Tool-(CPOT)  PAIN AD Score:   http://[email or web address]/painad.pdf (press ctrl +  left click to view)      MEDICATIONS  (STANDING):  aspirin enteric coated 81 milliGRAM(s) Oral daily  cefepime   IVPB 2000 milliGRAM(s) IV Intermittent every 24 hours  chlorhexidine 0.12% Liquid 15 milliLiter(s) Oral Mucosa every 12 hours  chlorhexidine 2% Cloths 1 Application(s) Topical <User Schedule>  chlorhexidine 4% Liquid 1 Application(s) Topical <User Schedule>  heparin   Injectable 5000 Unit(s) SubCutaneous every 12 hours  influenza  Vaccine (HIGH DOSE) 0.7 milliLiter(s) IntraMuscular once  lactated ringers Bolus 1000 milliLiter(s) IV Bolus once  lactulose Syrup 20 Gram(s) Oral three times a day  norepinephrine Infusion 1 MICROgram(s)/kG/Min (80.7 mL/Hr) IV Continuous <Continuous>  pantoprazole  Injectable 40 milliGRAM(s) IV Push daily  propofol Infusion 40 MICROgram(s)/kG/Min (20.7 mL/Hr) IV Continuous <Continuous>  vasopressin Infusion 0.04 Unit(s)/Min (6 mL/Hr) IV Continuous <Continuous>    MEDICATIONS  (PRN):  sodium chloride 0.9% lock flush 10 milliLiter(s) IV Push every 1 hour PRN Pre/post blood products, medications, blood draw, and to maintain line patency      PHYSICAL EXAM:  Vital Signs Last 24 Hrs  T(C): 36.4 (27 Nov 2023 08:00), Max: 36.9 (26 Nov 2023 23:14)  T(F): 97.6 (27 Nov 2023 08:00), Max: 98.4 (26 Nov 2023 23:14)  HR: 108 (27 Nov 2023 10:15) (84 - 137)  BP: 97/77 (27 Nov 2023 10:15) (58/18 - 143/91)  BP(mean): 84 (27 Nov 2023 10:15) (31 - 86)  RR: 30 (27 Nov 2023 10:15) (20 - 35)  SpO2: 100% (27 Nov 2023 10:15) (60% - 100%)    Parameters below as of 27 Nov 2023 08:00  Patient On (Oxygen Delivery Method): ventilator    O2 Concentration (%): 100    General: Pt is intubated/sedated.     Palliative Performance Scale/Karnofsky Score: 10%  http://npcrc.org/files/news/palliative_performance_scale_ppsv2.pdf    HEENT: no abnormal lesion, +ET  Lungs: intubated  CV: RRR, S1S2,   GI: soft non distended non tender  incontinent  : incontinent /nevarez  Musculoskeletal: weakness x4, bedbound  Skin: no abnormal skin lesions, poor skin turgor  Neuro: intubated/sedated  Oral intake ability: unable/only mouth care    LABS:                        7.8    10.49 )-----------( 279      ( 26 Nov 2023 08:50 )             25.2     11-26    146<H>  |  115<H>  |  66<H>  ----------------------------<  141<H>  4.2   |  24  |  3.26<H>    Ca    9.6      26 Nov 2023 08:50  Phos  4.0     11-26  Mg     2.0     11-26      Urinalysis Basic - ( 26 Nov 2023 08:50 )    Color: x / Appearance: x / SG: x / pH: x  Gluc: 141 mg/dL / Ketone: x  / Bili: x / Urobili: x   Blood: x / Protein: x / Nitrite: x   Leuk Esterase: x / RBC: x / WBC x   Sq Epi: x / Non Sq Epi: x / Bacteria: x    < from: CT Head No Cont (11.24.23 @ 13:17) >    ACC: [id number] EXAM:  CT CERVICAL SPINE   ORDERED BY: FRANK MORROW     ACC: [id number] EXAM:  CT BRAIN   ORDERED BY: FRANK MORROW     PROCEDURE DATE:  11/24/2023          INTERPRETATION:  CT HEAD, CT CERVICAL SPINE    Clinical information: s/p fall    IMAGING TECHNIQUE:  Noncontrast CT.  Axial Acquisition.  Sagittal and coronal reformations.    COMPARISON:  Exams are compared to prior studies of 11/17/2023    HEAD CT FINDINGS:  HEMORRHAGE:  No evidence of intracranial hemorrhage.  BRAIN PARENCHYMA:  Mild to moderate atrophy. Mild periventricular white   matter ischemic changes. No mass or mass effect.  VENTRICLES / SHIFT:  No hydrocephalus. No midline shift.  EXTRA-AXIAL / BASAL CISTERNS:  No extra-axial mass. Basal cisterns   preserved.  Atherosclerotic calcifications of the cavernous internal   carotid arteries.  CALVARIUM AND EXTRACRANIAL SOFT TISSUES:  No depressed calvarial fracture.  SINUSES, ORBITS, MASTOIDS:  Moderate retention cyst right maxillary   sinus. Mild fluid left mastoid    CERVICAL SPINE FINDINGS:  FRACTURES:  No evidence of an acute cervical spine fracture.  ALIGNMENT:  Straightening of the normal cervical lordosis. Stable grade 1   anterolisthesis C7 on T1  SPINAL COLUMN:  Vertebral body heights are well-maintained. Fusion of the   C2-3, C5-6 and C6-7 disc spaces on a degenerative basis. Severe narrowing   of the C3-4 and C4-5 disc spaces with associated endplate degenerative   changes and osteophytes. Multilevel facet DJD.  OTHER: Marked calcification/ossification of the left stylohyoid ligament.   Atherosclerotic calcification of the left carotid bifurcation    IMPRESSION:  HEAD CT:  No evidence of an acute traumatic intracranial injury.  CERVICAL SPINE CT:  No evidence of an acute cervical spine fracture.    < end of copied text >      RADIOLOGY & ADDITIONAL STUDIES: reviewed  ADVANCED DIRECTIVES: MOLST: DNR/DNI/no feeding tube

## 2023-11-27 NOTE — PROGRESS NOTE ADULT - ASSESSMENT
Patient is a 89y Male with P. Afib, HTN, HLD, recently admitted to [facility] 11/17-11/22 with left proximal fibula fracture (conservative management; immobilizer), resolving VAMSI (dc/ with SCr 1.37 ), with urinary retention presents with hypotension. Nephrology consulted for Elevated serum creatinine.      1. VAMSI 2/2 ATN in the setting of septic shock/ hypotension with atrophic left kidney. Unknown baseline SCr; eric SCr 1.37 on 11/22/23. Would avoid LR IVF in the setting of liver dysfunction/ lactic acidosis/ hyperkalemia  Pt with anion gap MA in the setting of severe lactic acidosis with resp acidosis with ABG ph 7.13; adjust vent settings accordingly. Recc D5 with 150 meq sodium bicarb @ 100ml/hr.  Mild hypokalemia- will shift with bicarb gtt; can give Lokelma if needed (10g PO tid x 48 hrs)  Last FeNa 0.16%; IVF as above.  c/w nevarez. Strict I/Os. Avoid nephrotoxins/ NSAIDs/ RCA. Monitor BMP.    2. Urinary retention- +bladder scan.  c/w  nevarez catheter.    3. Septic shock- concerning for aspiration PNA. Pt on Cefepime/ Flagyl. Plan as per ICU    4. Hypotension- pt on 2 pressors. Plan as per ICU.       [facility]PHR[facility]  Jose Reyna M.D.  Amaury Rodgers D.O.  Rivka Cleary M.D.  MD Karla Carbajal, MSN, ANP-C    Telephone: [phone]  Facsimile: [phone] [place] [address], [id number]  [place] [address]

## 2023-11-28 DIAGNOSIS — A41.9 SEPSIS, UNSPECIFIED ORGANISM: ICD-10-CM

## 2023-11-28 DIAGNOSIS — N17.9 ACUTE KIDNEY FAILURE, UNSPECIFIED: ICD-10-CM

## 2023-11-30 LAB
CULTURE RESULTS: SIGNIFICANT CHANGE UP
HOMOCYSTEINE LEVEL: 21.9 UMOL/L — HIGH
HOMOCYSTEINE LEVEL: 21.9 UMOL/L — HIGH
METHYLMALONIC ACID LEVEL: 469 NMOL/L — HIGH (ref 87–318)
METHYLMALONIC ACID LEVEL: 469 NMOL/L — HIGH (ref 87–318)
SPECIMEN SOURCE: SIGNIFICANT CHANGE UP

## 2023-12-02 LAB
CULTURE RESULTS: SIGNIFICANT CHANGE UP
SPECIMEN SOURCE: SIGNIFICANT CHANGE UP
